# Patient Record
Sex: FEMALE | Race: WHITE | NOT HISPANIC OR LATINO | Employment: FULL TIME | URBAN - NONMETROPOLITAN AREA
[De-identification: names, ages, dates, MRNs, and addresses within clinical notes are randomized per-mention and may not be internally consistent; named-entity substitution may affect disease eponyms.]

---

## 2018-04-27 LAB
ALBUMIN SERPL BCP-MCNC: 4.2 G/DL (ref 3.5–5.7)
ALP SERPL-CCNC: 68 IU/L (ref 55–165)
ALT SERPL W P-5'-P-CCNC: 13 IU/L (ref 9–28)
ANION GAP SERPL CALCULATED.3IONS-SCNC: 11.9 MM/L
AST SERPL W P-5'-P-CCNC: 13 U/L (ref 7–26)
BASOPHILS # BLD AUTO: 0.2 X3/UL (ref 0–0.3)
BASOPHILS # BLD AUTO: 2.2 % (ref 0–2)
BILIRUB SERPL-MCNC: 0.6 MG/DL (ref 0.3–1)
BUN SERPL-MCNC: 19 MG/DL (ref 7–25)
CALCIUM SERPL-MCNC: 9.6 MG/DL (ref 8.6–10.5)
CHLORIDE SERPL-SCNC: 104 MM/L (ref 98–107)
CO2 SERPL-SCNC: 28 MM/L (ref 21–31)
CREAT SERPL-MCNC: 0.88 MG/DL (ref 0.6–1.2)
DEPRECATED RDW RBC AUTO: 15.3 %
EGFR (HISTORICAL): > 60 GFR
EGFR AFRICAN AMERICAN (HISTORICAL): > 60 GFR
EOSINOPHIL # BLD AUTO: 0.4 X3/UL (ref 0–0.5)
EOSINOPHIL NFR BLD AUTO: 4.7 % (ref 0–5)
GLUCOSE (HISTORICAL): 93 MG/DL (ref 65–99)
HCT VFR BLD AUTO: 42.7 % (ref 37–47)
HGB BLD-MCNC: 14.3 G/DL (ref 12–16)
LYMPHOCYTES # BLD AUTO: 1.8 X3/UL (ref 1.2–4.2)
LYMPHOCYTES NFR BLD AUTO: 23.2 % (ref 20.5–51.1)
MCH RBC QN AUTO: 29.4 PG (ref 26–34)
MCHC RBC AUTO-ENTMCNC: 33.6 G/DL (ref 31–37)
MCV RBC AUTO: 87.3 FL (ref 81–99)
MONOCYTES # BLD AUTO: 0.7 X3/UL (ref 0–1)
MONOCYTES NFR BLD AUTO: 8.7 % (ref 1.7–12)
NEUTROPHILS # BLD AUTO: 4.7 X3/UL (ref 1.4–6.5)
NEUTS SEG NFR BLD AUTO: 61.2 % (ref 42.2–75.2)
OSMOLALITY, SERUM (HISTORICAL): 279 MOSM (ref 262–291)
PLATELET # BLD AUTO: 251 X3/UL (ref 130–400)
PMV BLD AUTO: 9.5 FL
POTASSIUM SERPL-SCNC: 4.9 MM/L (ref 3.5–5.5)
RBC # BLD AUTO: 4.89 X6/UL (ref 3.9–5.2)
SODIUM SERPL-SCNC: 139 MM/L (ref 134–143)
TOTAL PROTEIN (HISTORICAL): 7 G/DL (ref 6.4–8.9)
WBC # BLD AUTO: 7.7 X3/UL (ref 4.8–10.8)

## 2018-09-28 PROBLEM — I42.9 PRIMARY CARDIOMYOPATHY (HCC): Status: ACTIVE | Noted: 2018-09-28

## 2018-09-28 PROBLEM — I10 BENIGN ESSENTIAL HYPERTENSION: Status: ACTIVE | Noted: 2018-09-28

## 2018-09-28 PROBLEM — I34.0 MITRAL REGURGITATION: Status: ACTIVE | Noted: 2018-09-28

## 2018-09-28 PROBLEM — E66.9 OBESE: Status: ACTIVE | Noted: 2018-09-28

## 2018-09-28 RX ORDER — FUROSEMIDE 20 MG/1
20 TABLET ORAL
COMMUNITY
End: 2018-11-27 | Stop reason: SDUPTHER

## 2018-09-28 RX ORDER — METOPROLOL SUCCINATE 50 MG/1
50 TABLET, EXTENDED RELEASE ORAL DAILY
COMMUNITY
End: 2019-06-24 | Stop reason: SDUPTHER

## 2018-09-28 RX ORDER — LISINOPRIL 10 MG/1
10 TABLET ORAL DAILY
COMMUNITY
End: 2019-06-24 | Stop reason: SDUPTHER

## 2018-09-28 RX ORDER — ASPIRIN 81 MG/1
81 TABLET ORAL DAILY
COMMUNITY
End: 2020-01-01

## 2018-10-29 ENCOUNTER — EVALUATION (OUTPATIENT)
Dept: PHYSICAL THERAPY | Facility: CLINIC | Age: 73
End: 2018-10-29
Payer: MEDICARE

## 2018-10-29 ENCOUNTER — TRANSCRIBE ORDERS (OUTPATIENT)
Dept: PHYSICAL THERAPY | Facility: CLINIC | Age: 73
End: 2018-10-29

## 2018-10-29 DIAGNOSIS — I89.0 LYMPHEDEMA: ICD-10-CM

## 2018-10-29 DIAGNOSIS — Z90.11 STATUS POST RIGHT MASTECTOMY: Primary | ICD-10-CM

## 2018-10-29 PROCEDURE — G8991 OTHER PT/OT GOAL STATUS: HCPCS | Performed by: PHYSICAL THERAPIST

## 2018-10-29 PROCEDURE — G8990 OTHER PT/OT CURRENT STATUS: HCPCS | Performed by: PHYSICAL THERAPIST

## 2018-10-29 PROCEDURE — 97535 SELF CARE MNGMENT TRAINING: CPT | Performed by: PHYSICAL THERAPIST

## 2018-10-29 PROCEDURE — 97161 PT EVAL LOW COMPLEX 20 MIN: CPT | Performed by: PHYSICAL THERAPIST

## 2018-10-29 NOTE — PROGRESS NOTES
PT Discharge    Today's date: 10/29/2018  Patient name: Shawn Cardona  : 1945  MRN: [id number]  Referring provider: Felipe King MD  Dx:   Encounter Diagnosis     ICD-10-CM    1  Status post right mastectomy Z90 11    2  Lymphedema I89 0      Murray Kruse will be discharged from outpatient physical therapy care due to expiration of plan of care  No objective measures updated, Murray Kruse is not present at time of discharge  Assessment    Assessment details: Shawn Cardona is a 68y o  year old female presenting to PT s/p mastectomy with increased risk for developing UE lymphedema  At this time limb volume is stable and no MLD is indicated  Measurements taken and recommendations made for compression garment to RUE  Bandaging is not indicated due to stable volume  She will contact clinic following her oncology follow up to discuss additional PT needs    Thank you for the referral    Understanding of Dx/Px/POC: good   Prognosis: good    Goals  STGs to be achieved in 2 - 4 weeks  Demonstrate at least 75% compliance with self MLD  Demonstrate at least 75% compliance with self compression  Demonstrate at least 75% compliance with self skin and nail inspection  Free from s/s of infection  Demonstrate understanding of importance of compliance with POC    LTGs to be achieved in 4 - 6 weeks  Demonstrate at least 100% compliance with self MLD  Demonstrate at least 100% compliance with self compression  Demonstrate at least 100% compliance with self skin and nail inspection  Free from s/s of infection  Demonstrate understanding of day/night compression wearing schedule  Obtain alternative compression to ensure independence with self management    Plan  Planned therapy interventions: manual therapy, therapeutic exercise and therapeutic training  Frequency: 2x week  Duration in weeks: 6          UE girth measurements  Right Left    Mid hand 20 0 19 2   Wrist 16 3 16 1   +10 cm  19 8 20 4   +20 cm 26 4 26 1   Elbow joint line 29 3 29 8   E+10 cm 37 35 4   E+20 cm 38 4 37          Subjective Evaluation    History of Present Illness  Mechanism of injury: Emmanuelle underwent R mastectomy with resection of 6 lymph nodes on 10/9  Follow up with medical oncology is scheduled for 11/9  In the interim she has been referred to PT to discuss compression treatment and CDT to manage lymphedema      Quality of life: good    Pain  No pain reported          Objective    Flowsheet Rows      Most Recent Value   PT/OT G-Codes   Current Score  63   Projected Score  0   Assessment Type  Evaluation   G code set  Other PT/OT Primary   Other PT Primary Current Status ()  CJ   Other PT Primary Goal Status ()  CJ

## 2018-10-29 NOTE — LETTER
2018    Sissy Cavanaugh MD  [address]    Patient: Amisha Chen   YOB: 1945   Date of Visit: 10/29/2018     Encounter Diagnosis     ICD-10-CM    1  Status post right mastectomy Z90 11    2  Lymphedema I89 0        Dear Dr Darrel Prescott:    Please review the attached Plan of Care from [facility] recent visit  Please verify that you agree therapy should continue by signing the attached document and sending it back to our office  If you have any questions or concerns, please don't hesitate to call  Sincerely,    Chasidy Batemanist, PT      Referring Provider:      I certify that I have read the below Plan of Care and certify the need for these services furnished under this plan of treatment while under my care  Sissy Cavanaugh MD  [facility]  [address]: [phone]            Today's date: 10/29/2018  Patient name: Amisha Chen  : 1945  MRN: [id number]  Referring provider: Jaimie Reed MD  Dx: No diagnosis found              Assessment/Plan    Subjective    Objective    Flowsheet Rows      Most Recent Value   PT/OT G-Codes   Current Score  63   Projected Score  0   Assessment Type  Evaluation   G code set  Other PT/OT Primary   Other PT Primary Current Status ()  CJ   Other PT Primary Goal Status ()  CJ        A user error has taken place: encounter opened in error, closed for administrative reasons  PT Evaluation     Today's date: 10/29/2018  Patient name: Amisha Chen  : 1945  MRN: [id number]  Referring provider: Jaimie Reed MD  Dx:   Encounter Diagnosis     ICD-10-CM    1  Status post right mastectomy Z90 11    2  Lymphedema I89 0                   Assessment    Assessment details: Amisha Chen is a 68y o  year old female presenting to PT s/p mastectomy with increased risk for developing UE lymphedema   At this time limb volume is stable and no MLD is indicated  Measurements taken and recommendations made for compression garment to RUE  Bandaging is not indicated due to stable volume  She will contact clinic following her oncology follow up to discuss additional PT needs  Thank you for the referral    Understanding of Dx/Px/POC: good   Prognosis: good    Goals  STGs to be achieved in 2 - 4 weeks  Demonstrate at least 75% compliance with self MLD  Demonstrate at least 75% compliance with self compression  Demonstrate at least 75% compliance with self skin and nail inspection  Free from s/s of infection  Demonstrate understanding of importance of compliance with POC    LTGs to be achieved in 4 - 6 weeks  Demonstrate at least 100% compliance with self MLD  Demonstrate at least 100% compliance with self compression  Demonstrate at least 100% compliance with self skin and nail inspection  Free from s/s of infection  Demonstrate understanding of day/night compression wearing schedule  Obtain alternative compression to ensure independence with self management    Plan  Planned therapy interventions: manual therapy, therapeutic exercise and therapeutic training  Frequency: 2x week  Duration in weeks: 6          UE girth measurements  Right Left    Mid hand 20 0 19 2   Wrist 16 3 16 1   +10 cm  19 8 20 4   +20 cm 26 4 26 1   Elbow joint line 29 3 29 8   E+10 cm 37 35 4   E+20 cm 38 4 37          Subjective Evaluation    History of Present Illness  Mechanism of injury: Emmanuelle underwent R mastectomy with resection of 6 lymph nodes on 10/9  Follow up with medical oncology is scheduled for 11/9  In the interim she has been referred to PT to discuss compression treatment and CDT to manage lymphedema      Quality of life: good    Pain  No pain reported          Objective    Flowsheet Rows      Most Recent Value   PT/OT G-Codes   Current Score  63   Projected Score  0   Assessment Type  Evaluation   G code set  Other PT/OT Primary   Other PT Primary Current Status ()  CJ   Other PT Primary Goal Status ()  CJ

## 2018-10-29 NOTE — PROGRESS NOTES
Today's date: 10/29/2018  Patient name: America Torres  : 1945  MRN: [id number]  Referring provider: Miguel A Michael MD  Dx: No diagnosis found              Assessment/Plan    Subjective    Objective    Flowsheet Rows      Most Recent Value   PT/OT G-Codes   Current Score  63   Projected Score  0   Assessment Type  Evaluation   G code set  Other PT/OT Primary   Other PT Primary Current Status ()  CJ   Other PT Primary Goal Status ()  CJ        A user error has taken place: encounter opened in error, closed for administrative reasons

## 2018-11-09 ENCOUNTER — APPOINTMENT (OUTPATIENT)
Dept: LAB | Facility: HOSPITAL | Age: 73
End: 2018-11-09
Attending: INTERNAL MEDICINE
Payer: MEDICARE

## 2018-11-09 ENCOUNTER — HOSPITAL ENCOUNTER (OUTPATIENT)
Dept: RADIOLOGY | Facility: HOSPITAL | Age: 73
Discharge: HOME | End: 2018-11-09
Attending: INTERNAL MEDICINE
Payer: MEDICARE

## 2018-11-09 ENCOUNTER — TRANSCRIBE ORDERS (OUTPATIENT)
Dept: ADMINISTRATIVE | Facility: HOSPITAL | Age: 73
End: 2018-11-09

## 2018-11-09 DIAGNOSIS — C85.90 NON-HODGKIN'S LYMPHOMA, UNSPECIFIED BODY REGION, UNSPECIFIED NON-HODGKIN LYMPHOMA TYPE (HCC): Primary | ICD-10-CM

## 2018-11-09 DIAGNOSIS — C85.90 NON-HODGKIN'S LYMPHOMA, UNSPECIFIED BODY REGION, UNSPECIFIED NON-HODGKIN LYMPHOMA TYPE (HCC): ICD-10-CM

## 2018-11-09 LAB
ALBUMIN SERPL BCP-MCNC: 4.1 G/DL (ref 3.5–5.7)
ALP SERPL-CCNC: 72 U/L (ref 55–165)
ALT SERPL W P-5'-P-CCNC: 11 U/L (ref 7–52)
ANION GAP SERPL CALCULATED.3IONS-SCNC: 9 MMOL/L (ref 4–13)
AST SERPL W P-5'-P-CCNC: 14 U/L (ref 13–39)
BASOPHILS # BLD AUTO: 0.1 THOUSANDS/ΜL (ref 0–0.1)
BASOPHILS NFR BLD AUTO: 1 % (ref 0–1)
BILIRUB SERPL-MCNC: 0.7 MG/DL (ref 0.2–1)
BUN SERPL-MCNC: 29 MG/DL (ref 7–25)
CALCIUM SERPL-MCNC: 9.8 MG/DL (ref 8.6–10.5)
CHLORIDE SERPL-SCNC: 103 MMOL/L (ref 98–107)
CO2 SERPL-SCNC: 27 MMOL/L (ref 21–31)
CREAT SERPL-MCNC: 0.92 MG/DL (ref 0.6–1.2)
EOSINOPHIL # BLD AUTO: 0.7 THOUSAND/ΜL (ref 0–0.61)
EOSINOPHIL NFR BLD AUTO: 8 % (ref 0–6)
ERYTHROCYTE [DISTWIDTH] IN BLOOD BY AUTOMATED COUNT: 15.1 % (ref 11.6–15.1)
GFR SERPL CREATININE-BSD FRML MDRD: 62 ML/MIN/1.73SQ M
GLUCOSE SERPL-MCNC: 96 MG/DL (ref 65–140)
HCT VFR BLD AUTO: 42.8 % (ref 37–47)
HGB BLD-MCNC: 14.3 G/DL (ref 11.5–15.4)
LYMPHOCYTES # BLD AUTO: 2 THOUSANDS/ΜL (ref 0.6–4.47)
LYMPHOCYTES NFR BLD AUTO: 22 % (ref 14–44)
MCH RBC QN AUTO: 29.5 PG (ref 26.8–34.3)
MCHC RBC AUTO-ENTMCNC: 33.4 G/DL (ref 31.4–37.4)
MCV RBC AUTO: 88 FL (ref 82–98)
MONOCYTES # BLD AUTO: 0.9 THOUSAND/ΜL (ref 0.17–1.22)
MONOCYTES NFR BLD AUTO: 10 % (ref 4–12)
NEUTROPHILS # BLD AUTO: 5.3 THOUSANDS/ΜL (ref 1.85–7.62)
NEUTS SEG NFR BLD AUTO: 59 % (ref 43–75)
NRBC BLD AUTO-RTO: 0 /100 WBCS
PLATELET # BLD AUTO: 303 THOUSANDS/UL (ref 149–390)
PMV BLD AUTO: 8.9 FL (ref 8.9–12.7)
POTASSIUM SERPL-SCNC: 4.6 MMOL/L (ref 3.5–5.5)
PROT SERPL-MCNC: 6.9 G/DL (ref 6.4–8.9)
RBC # BLD AUTO: 4.84 MILLION/UL (ref 3.81–5.12)
SODIUM SERPL-SCNC: 139 MMOL/L (ref 134–143)
WBC # BLD AUTO: 9 THOUSAND/UL (ref 4.31–10.16)

## 2018-11-09 PROCEDURE — 71046 X-RAY EXAM CHEST 2 VIEWS: CPT

## 2018-11-09 PROCEDURE — 85025 COMPLETE CBC W/AUTO DIFF WBC: CPT

## 2018-11-09 PROCEDURE — 80053 COMPREHEN METABOLIC PANEL: CPT

## 2018-11-09 PROCEDURE — 36415 COLL VENOUS BLD VENIPUNCTURE: CPT

## 2018-11-13 ENCOUNTER — OFFICE VISIT (OUTPATIENT)
Dept: CARDIOLOGY CLINIC | Facility: CLINIC | Age: 73
End: 2018-11-13
Payer: MEDICARE

## 2018-11-13 VITALS
WEIGHT: 236 LBS | DIASTOLIC BLOOD PRESSURE: 70 MMHG | HEIGHT: 70 IN | BODY MASS INDEX: 33.79 KG/M2 | SYSTOLIC BLOOD PRESSURE: 108 MMHG | HEART RATE: 72 BPM

## 2018-11-13 DIAGNOSIS — I42.9 PRIMARY CARDIOMYOPATHY (HCC): Primary | ICD-10-CM

## 2018-11-13 DIAGNOSIS — I10 BENIGN ESSENTIAL HYPERTENSION: ICD-10-CM

## 2018-11-13 DIAGNOSIS — I34.0 NON-RHEUMATIC MITRAL REGURGITATION: ICD-10-CM

## 2018-11-13 PROCEDURE — 99213 OFFICE O/P EST LOW 20 MIN: CPT | Performed by: INTERNAL MEDICINE

## 2018-11-13 NOTE — PROGRESS NOTES
HPI:   81-year-old moderately obese female with nonischemic cardiomyopathy mild mitral regurgitation  She recently had a right breast mastectomy for carcinoma  Last ejection fraction was 45%  She has had no chest pain shortness of breath dizziness heart palpitations orthopnea nocturnal dyspnea or ankle swelling  Medications reviewed  Current Outpatient Prescriptions:     aspirin (ECOTRIN LOW STRENGTH) 81 mg EC tablet, Take 81 mg by mouth daily, Disp: , Rfl:     furosemide (LASIX) 20 mg tablet, Take 20 mg by mouth 2 times a week or as directed , Disp: , Rfl:     lisinopril (ZESTRIL) 10 mg tablet, Take 10 mg by mouth daily, Disp: , Rfl:     metoprolol succinate (TOPROL-XL) 50 mg 24 hr tablet, Take 50 mg by mouth daily, Disp: , Rfl:       PHYSICAL EXAM:  Blood pressure 110/70 weight 236 pulse 70 and regular respirations 18  Head eyes ears nose and throat are normal lungs are clear without wheezing rales or rhonchi no chest wall deformity normal breath sounds no pleural friction rub  Cardiac exam reveals a normal PMI no heaves lifts or thrills there is a grade 2 murmur of mitral regurgitation  Right breast mastectomy  Abdomen is soft nontender no masses or bruits or organomegaly normal bowel sounds  IMPRESSION AND PLAN:  Nonischemic cardiomyopathy well compensated    Mild mitral regurgitation asymptomatic    Essential hypertension controlled    Status post right breast mastectomy for carcinoma followed by Oncology      Follow-up in 1 year

## 2018-11-14 ENCOUNTER — TRANSCRIBE ORDERS (OUTPATIENT)
Dept: ADMINISTRATIVE | Facility: HOSPITAL | Age: 73
End: 2018-11-14

## 2018-11-14 DIAGNOSIS — Z79.811 USE OF AROMATASE INHIBITORS: Primary | ICD-10-CM

## 2018-11-27 DIAGNOSIS — R60.9 EDEMA, UNSPECIFIED TYPE: Primary | ICD-10-CM

## 2018-11-27 RX ORDER — FUROSEMIDE 20 MG/1
20 TABLET ORAL 2 TIMES WEEKLY
Qty: 30 TABLET | Refills: 5 | Status: SHIPPED | OUTPATIENT
Start: 2018-11-29 | End: 2019-01-02 | Stop reason: SDUPTHER

## 2018-11-30 ENCOUNTER — HOSPITAL ENCOUNTER (OUTPATIENT)
Dept: BONE DENSITY | Facility: HOSPITAL | Age: 73
Discharge: HOME | End: 2018-11-30
Attending: INTERNAL MEDICINE
Payer: MEDICARE

## 2018-11-30 DIAGNOSIS — Z79.811 USE OF AROMATASE INHIBITORS: ICD-10-CM

## 2018-11-30 PROCEDURE — 77080 DXA BONE DENSITY AXIAL: CPT

## 2019-01-02 DIAGNOSIS — R60.9 EDEMA, UNSPECIFIED TYPE: ICD-10-CM

## 2019-01-02 RX ORDER — FUROSEMIDE 20 MG/1
TABLET ORAL
Qty: 30 TABLET | Refills: 5 | Status: SHIPPED | OUTPATIENT
Start: 2019-01-02 | End: 2019-12-12 | Stop reason: CLARIF

## 2019-05-31 ENCOUNTER — TRANSCRIBE ORDERS (OUTPATIENT)
Dept: ADMINISTRATIVE | Facility: HOSPITAL | Age: 74
End: 2019-05-31

## 2019-05-31 ENCOUNTER — APPOINTMENT (OUTPATIENT)
Dept: LAB | Facility: HOSPITAL | Age: 74
End: 2019-05-31
Attending: INTERNAL MEDICINE
Payer: COMMERCIAL

## 2019-05-31 ENCOUNTER — HOSPITAL ENCOUNTER (OUTPATIENT)
Dept: RADIOLOGY | Facility: HOSPITAL | Age: 74
Discharge: HOME | End: 2019-05-31
Attending: INTERNAL MEDICINE
Payer: COMMERCIAL

## 2019-05-31 DIAGNOSIS — C88.4 MALT LYMPHOMA (HCC): ICD-10-CM

## 2019-05-31 DIAGNOSIS — C50.911 INFILTRATING DUCTAL CARCINOMA OF BREAST, RIGHT (HCC): ICD-10-CM

## 2019-05-31 DIAGNOSIS — C50.911 INFILTRATING DUCTAL CARCINOMA OF BREAST, RIGHT (HCC): Primary | ICD-10-CM

## 2019-05-31 DIAGNOSIS — C85.90 NON-HODGKIN'S LYMPHOMA, UNSPECIFIED BODY REGION, UNSPECIFIED NON-HODGKIN LYMPHOMA TYPE (HCC): ICD-10-CM

## 2019-05-31 LAB
ALBUMIN SERPL BCP-MCNC: 4.3 G/DL (ref 3.5–5.7)
ALP SERPL-CCNC: 75 U/L (ref 55–165)
ALT SERPL W P-5'-P-CCNC: 10 U/L (ref 7–52)
ANION GAP SERPL CALCULATED.3IONS-SCNC: 9 MMOL/L (ref 4–13)
AST SERPL W P-5'-P-CCNC: 13 U/L (ref 13–39)
BASOPHILS # BLD AUTO: 0.1 THOUSANDS/ΜL (ref 0–0.1)
BASOPHILS NFR BLD AUTO: 1 % (ref 0–1)
BILIRUB SERPL-MCNC: 0.6 MG/DL (ref 0.2–1)
BUN SERPL-MCNC: 24 MG/DL (ref 7–25)
CALCIUM SERPL-MCNC: 9.8 MG/DL (ref 8.6–10.5)
CHLORIDE SERPL-SCNC: 101 MMOL/L (ref 98–107)
CO2 SERPL-SCNC: 29 MMOL/L (ref 21–31)
CREAT SERPL-MCNC: 0.92 MG/DL (ref 0.6–1.2)
EOSINOPHIL # BLD AUTO: 0.4 THOUSAND/ΜL (ref 0–0.61)
EOSINOPHIL NFR BLD AUTO: 4 % (ref 0–6)
ERYTHROCYTE [DISTWIDTH] IN BLOOD BY AUTOMATED COUNT: 14.8 % (ref 11.6–15.1)
GFR SERPL CREATININE-BSD FRML MDRD: 62 ML/MIN/1.73SQ M
GLUCOSE SERPL-MCNC: 84 MG/DL (ref 65–140)
HCT VFR BLD AUTO: 42.8 % (ref 37–47)
HGB BLD-MCNC: 14.2 G/DL (ref 11.5–15.4)
LYMPHOCYTES # BLD AUTO: 2 THOUSANDS/ΜL (ref 0.6–4.47)
LYMPHOCYTES NFR BLD AUTO: 21 % (ref 14–44)
MCH RBC QN AUTO: 29.1 PG (ref 26.8–34.3)
MCHC RBC AUTO-ENTMCNC: 33.3 G/DL (ref 31.4–37.4)
MCV RBC AUTO: 88 FL (ref 82–98)
MONOCYTES # BLD AUTO: 0.9 THOUSAND/ΜL (ref 0.17–1.22)
MONOCYTES NFR BLD AUTO: 9 % (ref 4–12)
NEUTROPHILS # BLD AUTO: 6.1 THOUSANDS/ΜL (ref 1.85–7.62)
NEUTS SEG NFR BLD AUTO: 64 % (ref 43–75)
PLATELET # BLD AUTO: 314 THOUSANDS/UL (ref 149–390)
PMV BLD AUTO: 8.9 FL (ref 8.9–12.7)
POTASSIUM SERPL-SCNC: 4.9 MMOL/L (ref 3.5–5.5)
PROT SERPL-MCNC: 7.4 G/DL (ref 6.4–8.9)
RBC # BLD AUTO: 4.89 MILLION/UL (ref 3.81–5.12)
SODIUM SERPL-SCNC: 139 MMOL/L (ref 134–143)
WBC # BLD AUTO: 9.6 THOUSAND/UL (ref 4.31–10.16)

## 2019-05-31 PROCEDURE — 86300 IMMUNOASSAY TUMOR CA 15-3: CPT

## 2019-05-31 PROCEDURE — 80053 COMPREHEN METABOLIC PANEL: CPT

## 2019-05-31 PROCEDURE — 85025 COMPLETE CBC W/AUTO DIFF WBC: CPT

## 2019-05-31 PROCEDURE — 36415 COLL VENOUS BLD VENIPUNCTURE: CPT

## 2019-05-31 PROCEDURE — 71046 X-RAY EXAM CHEST 2 VIEWS: CPT

## 2019-06-01 LAB — CANCER AG15-3 SERPL-ACNC: 30 U/ML (ref 0–25)

## 2019-06-19 ENCOUNTER — TRANSCRIBE ORDERS (OUTPATIENT)
Dept: ADMINISTRATIVE | Facility: HOSPITAL | Age: 74
End: 2019-06-19

## 2019-06-19 DIAGNOSIS — N28.1 ACQUIRED CYST OF KIDNEY: Primary | ICD-10-CM

## 2019-06-24 DIAGNOSIS — I10 BENIGN HYPERTENSION: Primary | ICD-10-CM

## 2019-06-24 RX ORDER — METOPROLOL SUCCINATE 50 MG/1
50 TABLET, EXTENDED RELEASE ORAL DAILY
Qty: 90 TABLET | Refills: 4 | Status: SHIPPED | OUTPATIENT
Start: 2019-06-24 | End: 2020-01-01

## 2019-06-24 RX ORDER — LISINOPRIL 10 MG/1
10 TABLET ORAL DAILY
Qty: 90 TABLET | Refills: 4 | Status: SHIPPED | OUTPATIENT
Start: 2019-06-24 | End: 2019-08-28 | Stop reason: SDUPTHER

## 2019-06-26 ENCOUNTER — HOSPITAL ENCOUNTER (OUTPATIENT)
Dept: ULTRASOUND IMAGING | Facility: HOSPITAL | Age: 74
Discharge: HOME | End: 2019-06-26
Attending: UROLOGY
Payer: COMMERCIAL

## 2019-06-26 DIAGNOSIS — N28.1 ACQUIRED CYST OF KIDNEY: ICD-10-CM

## 2019-06-26 PROCEDURE — 76770 US EXAM ABDO BACK WALL COMP: CPT

## 2019-08-28 DIAGNOSIS — I10 BENIGN HYPERTENSION: ICD-10-CM

## 2019-08-28 RX ORDER — LISINOPRIL 10 MG/1
10 TABLET ORAL DAILY
Qty: 90 TABLET | Refills: 4 | Status: SHIPPED | OUTPATIENT
Start: 2019-08-28 | End: 2020-01-01 | Stop reason: SDUPTHER

## 2019-09-23 ENCOUNTER — TELEPHONE (OUTPATIENT)
Dept: CARDIOLOGY CLINIC | Facility: CLINIC | Age: 74
End: 2019-09-23

## 2019-09-23 NOTE — TELEPHONE ENCOUNTER
Patient called she is having Mohrs surgery on the 16th  She is on a low dose aspirin  Her surgeon said to take all meds as normal before surgery  She states the last time she had this done she bled a lot and she thinks it had to do with her being on the aspirin  She wants to know if she should/ it would be safe stop the aspirin a couple days before the surgery

## 2019-11-29 ENCOUNTER — TRANSCRIBE ORDERS (OUTPATIENT)
Dept: ADMINISTRATIVE | Facility: HOSPITAL | Age: 74
End: 2019-11-29

## 2019-11-29 ENCOUNTER — LAB (OUTPATIENT)
Dept: LAB | Facility: HOSPITAL | Age: 74
End: 2019-11-29
Payer: COMMERCIAL

## 2019-11-29 ENCOUNTER — HOSPITAL ENCOUNTER (OUTPATIENT)
Dept: RADIOLOGY | Facility: HOSPITAL | Age: 74
Discharge: HOME | End: 2019-11-29
Payer: COMMERCIAL

## 2019-11-29 DIAGNOSIS — C34.90 MALIGNANT NEOPLASM OF LUNG, UNSPECIFIED LATERALITY, UNSPECIFIED PART OF LUNG (HCC): Primary | ICD-10-CM

## 2019-11-29 DIAGNOSIS — C34.90 MALIGNANT NEOPLASM OF LUNG, UNSPECIFIED LATERALITY, UNSPECIFIED PART OF LUNG (HCC): ICD-10-CM

## 2019-11-29 DIAGNOSIS — C50.911 MALIGNANT NEOPLASM OF RIGHT FEMALE BREAST, UNSPECIFIED ESTROGEN RECEPTOR STATUS, UNSPECIFIED SITE OF BREAST (HCC): ICD-10-CM

## 2019-11-29 LAB
ALBUMIN SERPL BCP-MCNC: 3.7 G/DL (ref 3.5–5)
ALP SERPL-CCNC: 108 U/L (ref 46–116)
ALT SERPL W P-5'-P-CCNC: 21 U/L (ref 12–78)
ANION GAP SERPL CALCULATED.3IONS-SCNC: 10 MMOL/L (ref 4–13)
AST SERPL W P-5'-P-CCNC: 20 U/L (ref 5–45)
BASOPHILS # BLD AUTO: 0.08 THOUSANDS/ΜL (ref 0–0.1)
BASOPHILS NFR BLD AUTO: 1 % (ref 0–1)
BILIRUB SERPL-MCNC: 0.7 MG/DL (ref 0.2–1)
BUN SERPL-MCNC: 20 MG/DL (ref 5–25)
CALCIUM SERPL-MCNC: 9.2 MG/DL (ref 8.3–10.1)
CHLORIDE SERPL-SCNC: 105 MMOL/L (ref 100–108)
CO2 SERPL-SCNC: 24 MMOL/L (ref 21–32)
CREAT SERPL-MCNC: 0.93 MG/DL (ref 0.6–1.3)
EOSINOPHIL # BLD AUTO: 0.38 THOUSAND/ΜL (ref 0–0.61)
EOSINOPHIL NFR BLD AUTO: 4 % (ref 0–6)
ERYTHROCYTE [DISTWIDTH] IN BLOOD BY AUTOMATED COUNT: 15.4 % (ref 11.6–15.1)
GFR SERPL CREATININE-BSD FRML MDRD: 61 ML/MIN/1.73SQ M
GLUCOSE P FAST SERPL-MCNC: 92 MG/DL (ref 65–99)
HCT VFR BLD AUTO: 49.1 % (ref 34.8–46.1)
HGB BLD-MCNC: 14.8 G/DL (ref 11.5–15.4)
IMM GRANULOCYTES # BLD AUTO: 0.03 THOUSAND/UL (ref 0–0.2)
IMM GRANULOCYTES NFR BLD AUTO: 0 % (ref 0–2)
LYMPHOCYTES # BLD AUTO: 2.26 THOUSANDS/ΜL (ref 0.6–4.47)
LYMPHOCYTES NFR BLD AUTO: 23 % (ref 14–44)
MCH RBC QN AUTO: 27.9 PG (ref 26.8–34.3)
MCHC RBC AUTO-ENTMCNC: 30.1 G/DL (ref 31.4–37.4)
MCV RBC AUTO: 93 FL (ref 82–98)
MONOCYTES # BLD AUTO: 0.91 THOUSAND/ΜL (ref 0.17–1.22)
MONOCYTES NFR BLD AUTO: 9 % (ref 4–12)
NEUTROPHILS # BLD AUTO: 6.34 THOUSANDS/ΜL (ref 1.85–7.62)
NEUTS SEG NFR BLD AUTO: 63 % (ref 43–75)
NRBC BLD AUTO-RTO: 0 /100 WBCS
PLATELET # BLD AUTO: 318 THOUSANDS/UL (ref 149–390)
PMV BLD AUTO: 10.4 FL (ref 8.9–12.7)
POTASSIUM SERPL-SCNC: 4.4 MMOL/L (ref 3.5–5.3)
PROT SERPL-MCNC: 7.9 G/DL (ref 6.4–8.2)
RBC # BLD AUTO: 5.31 MILLION/UL (ref 3.81–5.12)
SODIUM SERPL-SCNC: 139 MMOL/L (ref 136–145)
WBC # BLD AUTO: 10 THOUSAND/UL (ref 4.31–10.16)

## 2019-11-29 PROCEDURE — 85025 COMPLETE CBC W/AUTO DIFF WBC: CPT

## 2019-11-29 PROCEDURE — 71046 X-RAY EXAM CHEST 2 VIEWS: CPT

## 2019-11-29 PROCEDURE — 86300 IMMUNOASSAY TUMOR CA 15-3: CPT

## 2019-11-29 PROCEDURE — 80053 COMPREHEN METABOLIC PANEL: CPT

## 2019-11-29 PROCEDURE — 36415 COLL VENOUS BLD VENIPUNCTURE: CPT

## 2019-11-30 LAB — CANCER AG15-3 SERPL-ACNC: 25.3 U/ML (ref 0–25)

## 2019-12-12 ENCOUNTER — OFFICE VISIT (OUTPATIENT)
Dept: CARDIOLOGY CLINIC | Facility: CLINIC | Age: 74
End: 2019-12-12
Payer: COMMERCIAL

## 2019-12-12 VITALS
WEIGHT: 237 LBS | SYSTOLIC BLOOD PRESSURE: 126 MMHG | BODY MASS INDEX: 35.92 KG/M2 | HEART RATE: 80 BPM | DIASTOLIC BLOOD PRESSURE: 84 MMHG | HEIGHT: 68 IN

## 2019-12-12 DIAGNOSIS — I10 BENIGN ESSENTIAL HYPERTENSION: ICD-10-CM

## 2019-12-12 DIAGNOSIS — I34.0 NONRHEUMATIC MITRAL VALVE REGURGITATION: Primary | ICD-10-CM

## 2019-12-12 DIAGNOSIS — I42.9 PRIMARY CARDIOMYOPATHY (HCC): ICD-10-CM

## 2019-12-12 DIAGNOSIS — I48.19 PERSISTENT ATRIAL FIBRILLATION (HCC): ICD-10-CM

## 2019-12-12 PROCEDURE — 93000 ELECTROCARDIOGRAM COMPLETE: CPT | Performed by: INTERNAL MEDICINE

## 2019-12-12 PROCEDURE — 99214 OFFICE O/P EST MOD 30 MIN: CPT | Performed by: INTERNAL MEDICINE

## 2019-12-12 RX ORDER — ANASTROZOLE 1 MG/1
1 TABLET ORAL DAILY
COMMUNITY
Start: 2019-12-05 | End: 2020-01-01

## 2019-12-12 NOTE — PROGRESS NOTES
Patient ID: Alex Robles is a 69 y o  female  Plan:      Benign essential hypertension  Really can't recall the last time it was high  Primary cardiomyopathy ([address] )  Likely adriamycin related  Will reassess  Mitral regurgitation  I can't hear much but will check echo  Persistent atrial fibrillation    This started some time after the last year's visit  Completely without symptoms  Heart rate is controlled  See discussion in follow-up plan but for now will check an echocardiogram before deciding upon anticoagulation  I do not believe rhythm control his proper here  Follow up Plan: We will talk after her her echocardiogram     For now furosemide can be stopped as she is only using it once a week  If there are features on the echo that suggest a strong need for anticoagulation we will speak  Otherwise if it is borderline she would like to avoid such a medication  HPI:   The patient comes in for yearly follow-up  In the past she saw my now retired partner  She has a history of mild nonischemic cardiomyopathy presumed related to prior Adriamycin for non-Hodgkin's lymphoma  Of note is that she had a 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   No chemo or radiation was required  There has been no recent change in exertional capacity  No palpitations  This is despite the fact that she has atrial fibrillation with a controlled rate on today's EKG  Results for orders placed or performed in visit on 12/12/19   POCT ECG    Impression    Afib with controlled rate  Left axis  Otherwise WNL  Most recent or relevant cardiac/vascular testing:      Echocardiogram November 2016: Ejection fraction 45%        Past Surgical History:   Procedure Laterality Date    MASTECTOMY Right 2018    TOTAL KNEE ARTHROPLASTY Right 2013     CMP:   Lab Results   Component Value Date     04/27/2018    K 4 4 11/29/2019    K 4 9 04/27/2018     11/29/2019    CL 104 04/27/2018    CO2 24 11/29/2019    CO2 28 04/27/2018    BUN 20 11/29/2019    BUN 19 04/27/2018    CREATININE 0 93 11/29/2019    CREATININE 0 88 04/27/2018    GLUCOSE 103 07/02/2015    EGFR 61 11/29/2019       Lipid Profile: No results found for: CHOL, TRIG, HDL, LDL      Review of Systems   10  point ROS  was otherwise non pertinent or negative except as per HPI or as below  Gait: Normal         Objective:     /84   Pulse 80   Ht 5' 8" (1 727 m)   Wt 108 kg (237 lb)   BMI 36 04 kg/m²     PHYSICAL EXAM:    General:  Normal appearance in no distress  Eyes:  Anicteric  Oral mucosa:  Moist   Neck:  No JVD  Carotid upstrokes are brisk without bruits  No masses  Chest:  Clear to auscultation and percussion  Cardiac:    Irregularly irregular  Normal PMI  Normal S1 and S2  No murmur gallop or rub  Abdomen:  Soft and nontender  No palpable organomegaly or aortic enlargement  Extremities:  No peripheral edema  Musculoskeletal:  Symmetric  Vascular:  Femoral pulses are brisk without bruits  Popliteal pulses are intact bilaterally  Pedal pulses are intact  Neuro:  Grossly symmetric  Psych:  Alert and oriented x3          Current Outpatient Medications:     anastrozole (ARIMIDEX) 1 mg tablet, Take 1 mg by mouth daily, Disp: , Rfl:     aspirin (ECOTRIN LOW STRENGTH) 81 mg EC tablet, Take 81 mg by mouth daily, Disp: , Rfl:     lisinopril (ZESTRIL) 10 mg tablet, Take 1 tablet (10 mg total) by mouth daily, Disp: 90 tablet, Rfl: 4    metoprolol succinate (TOPROL-XL) 50 mg 24 hr tablet, Take 1 tablet (50 mg total) by mouth daily, Disp: 90 tablet, Rfl: 4  Allergies   Allergen Reactions    Celecoxib Other (See Comments)    Other Other (See Comments)     Skin irritated and opened up     Past Medical History:   Diagnosis Date    Breast cancer ([address] )     s/p right mastectomy    [facility]     Essential (primary) hypertension     Hx of echocardiogram 06/05/2015    EF0 45 (45%) mild mitral regurg   / 2D w/CFD; EF0 43 (43%) mild MR 11/25/16    Mitral regurgitation     Non Hodgkin's lymphoma (HCC)     Nonrheumatic mitral (valve) insufficiency     Obesity            Social History     Tobacco Use   Smoking Status Former Smoker   Smokeless Tobacco Never Used

## 2019-12-27 ENCOUNTER — HOSPITAL ENCOUNTER (OUTPATIENT)
Dept: NON INVASIVE DIAGNOSTICS | Facility: HOSPITAL | Age: 74
Discharge: HOME | End: 2019-12-27
Payer: COMMERCIAL

## 2019-12-27 DIAGNOSIS — I48.19 PERSISTENT ATRIAL FIBRILLATION (HCC): ICD-10-CM

## 2019-12-27 PROCEDURE — 93306 TTE W/DOPPLER COMPLETE: CPT

## 2019-12-30 PROCEDURE — 93306 TTE W/DOPPLER COMPLETE: CPT | Performed by: INTERNAL MEDICINE

## 2020-01-01 ENCOUNTER — OFFICE VISIT (OUTPATIENT)
Dept: CARDIOLOGY CLINIC | Facility: CLINIC | Age: 75
End: 2020-01-01
Payer: COMMERCIAL

## 2020-01-01 ENCOUNTER — APPOINTMENT (EMERGENCY)
Dept: RADIOLOGY | Facility: HOSPITAL | Age: 75
DRG: 872 | End: 2020-01-01
Payer: COMMERCIAL

## 2020-01-01 ENCOUNTER — APPOINTMENT (OUTPATIENT)
Dept: LAB | Facility: HOSPITAL | Age: 75
End: 2020-01-01
Payer: COMMERCIAL

## 2020-01-01 ENCOUNTER — APPOINTMENT (INPATIENT)
Dept: RADIOLOGY | Facility: HOSPITAL | Age: 75
DRG: 177 | End: 2020-01-01
Payer: COMMERCIAL

## 2020-01-01 ENCOUNTER — APPOINTMENT (INPATIENT)
Dept: CT IMAGING | Facility: HOSPITAL | Age: 75
DRG: 872 | End: 2020-01-01
Payer: COMMERCIAL

## 2020-01-01 ENCOUNTER — APPOINTMENT (EMERGENCY)
Dept: CT IMAGING | Facility: HOSPITAL | Age: 75
DRG: 872 | End: 2020-01-01
Payer: COMMERCIAL

## 2020-01-01 ENCOUNTER — HOSPITAL ENCOUNTER (INPATIENT)
Facility: HOSPITAL | Age: 75
LOS: 1 days | DRG: 872 | End: 2021-01-01
Attending: EMERGENCY MEDICINE | Admitting: INTERNAL MEDICINE
Payer: COMMERCIAL

## 2020-01-01 ENCOUNTER — TRANSCRIBE ORDERS (OUTPATIENT)
Dept: ADMINISTRATIVE | Facility: HOSPITAL | Age: 75
End: 2020-01-01

## 2020-01-01 ENCOUNTER — HOSPITAL ENCOUNTER (INPATIENT)
Facility: HOSPITAL | Age: 75
LOS: 14 days | Discharge: NURSING, REHAB OR LONG-TERM CARE | DRG: 177 | End: 2020-11-25
Attending: EMERGENCY MEDICINE | Admitting: HOSPITALIST
Payer: COMMERCIAL

## 2020-01-01 ENCOUNTER — APPOINTMENT (INPATIENT)
Dept: RADIOLOGY | Facility: HOSPITAL | Age: 75
DRG: 872 | End: 2020-01-01
Payer: COMMERCIAL

## 2020-01-01 ENCOUNTER — APPOINTMENT (EMERGENCY)
Dept: RADIOLOGY | Facility: HOSPITAL | Age: 75
DRG: 177 | End: 2020-01-01
Payer: COMMERCIAL

## 2020-01-01 ENCOUNTER — TELEPHONE (OUTPATIENT)
Dept: CARDIOLOGY CLINIC | Facility: CLINIC | Age: 75
End: 2020-01-01

## 2020-01-01 ENCOUNTER — TELEMEDICINE (OUTPATIENT)
Dept: CARDIOLOGY CLINIC | Facility: CLINIC | Age: 75
End: 2020-01-01
Payer: COMMERCIAL

## 2020-01-01 ENCOUNTER — OFFICE VISIT (OUTPATIENT)
Dept: URGENT CARE | Facility: CLINIC | Age: 75
End: 2020-01-01
Payer: COMMERCIAL

## 2020-01-01 ENCOUNTER — HOSPITAL ENCOUNTER (OUTPATIENT)
Dept: CT IMAGING | Facility: HOSPITAL | Age: 75
Discharge: HOME | End: 2020-07-10
Payer: COMMERCIAL

## 2020-01-01 VITALS
BODY MASS INDEX: 36.98 KG/M2 | DIASTOLIC BLOOD PRESSURE: 72 MMHG | SYSTOLIC BLOOD PRESSURE: 134 MMHG | WEIGHT: 209 LBS | DIASTOLIC BLOOD PRESSURE: 86 MMHG | HEIGHT: 68 IN | HEART RATE: 72 BPM | SYSTOLIC BLOOD PRESSURE: 118 MMHG | BODY MASS INDEX: 31.67 KG/M2 | WEIGHT: 244 LBS | HEIGHT: 68 IN | HEART RATE: 72 BPM

## 2020-01-01 VITALS
SYSTOLIC BLOOD PRESSURE: 120 MMHG | DIASTOLIC BLOOD PRESSURE: 72 MMHG | HEART RATE: 62 BPM | WEIGHT: 228 LBS | BODY MASS INDEX: 34.56 KG/M2 | HEIGHT: 68 IN

## 2020-01-01 VITALS
SYSTOLIC BLOOD PRESSURE: 102 MMHG | HEIGHT: 70 IN | BODY MASS INDEX: 25.25 KG/M2 | HEART RATE: 114 BPM | DIASTOLIC BLOOD PRESSURE: 60 MMHG | WEIGHT: 176.37 LBS | OXYGEN SATURATION: 94 % | RESPIRATION RATE: 16 BRPM | TEMPERATURE: 97 F

## 2020-01-01 VITALS — BODY MASS INDEX: 34.63 KG/M2 | HEART RATE: 78 BPM | WEIGHT: 228.5 LBS | HEIGHT: 68 IN

## 2020-01-01 VITALS
HEART RATE: 95 BPM | SYSTOLIC BLOOD PRESSURE: 122 MMHG | TEMPERATURE: 97.8 F | OXYGEN SATURATION: 88 % | RESPIRATION RATE: 16 BRPM | DIASTOLIC BLOOD PRESSURE: 62 MMHG

## 2020-01-01 VITALS
SYSTOLIC BLOOD PRESSURE: 140 MMHG | DIASTOLIC BLOOD PRESSURE: 80 MMHG | HEIGHT: 68 IN | HEART RATE: 80 BPM | BODY MASS INDEX: 36.22 KG/M2 | WEIGHT: 239 LBS

## 2020-01-01 VITALS
HEART RATE: 78 BPM | SYSTOLIC BLOOD PRESSURE: 128 MMHG | BODY MASS INDEX: 35.61 KG/M2 | WEIGHT: 235 LBS | DIASTOLIC BLOOD PRESSURE: 84 MMHG | HEIGHT: 68 IN

## 2020-01-01 VITALS
WEIGHT: 200 LBS | SYSTOLIC BLOOD PRESSURE: 110 MMHG | HEIGHT: 68 IN | DIASTOLIC BLOOD PRESSURE: 80 MMHG | HEART RATE: 74 BPM | BODY MASS INDEX: 30.31 KG/M2

## 2020-01-01 DIAGNOSIS — J96.02 ACUTE RESPIRATORY FAILURE WITH HYPOXIA AND HYPERCAPNIA (HCC): ICD-10-CM

## 2020-01-01 DIAGNOSIS — I50.22 CHRONIC SYSTOLIC CONGESTIVE HEART FAILURE (HCC): Primary | ICD-10-CM

## 2020-01-01 DIAGNOSIS — I48.19 PERSISTENT ATRIAL FIBRILLATION (HCC): Primary | ICD-10-CM

## 2020-01-01 DIAGNOSIS — L03.317 CELLULITIS OF RIGHT BUTTOCK: Primary | ICD-10-CM

## 2020-01-01 DIAGNOSIS — E87.5 HYPERKALEMIA: ICD-10-CM

## 2020-01-01 DIAGNOSIS — I50.22 CHRONIC SYSTOLIC CONGESTIVE HEART FAILURE (HCC): ICD-10-CM

## 2020-01-01 DIAGNOSIS — J96.11 CHRONIC RESPIRATORY FAILURE WITH HYPOXIA, ON HOME OXYGEN THERAPY (HCC): ICD-10-CM

## 2020-01-01 DIAGNOSIS — R19.4 CHANGE IN BOWEL HABITS: ICD-10-CM

## 2020-01-01 DIAGNOSIS — I34.0 NONRHEUMATIC MITRAL VALVE REGURGITATION: Primary | ICD-10-CM

## 2020-01-01 DIAGNOSIS — R14.0 ABDOMINAL DISTENSION: ICD-10-CM

## 2020-01-01 DIAGNOSIS — J12.82 PNEUMONIA DUE TO COVID-19 VIRUS: ICD-10-CM

## 2020-01-01 DIAGNOSIS — N17.9 ACUTE RENAL FAILURE SUPERIMPOSED ON STAGE 3A CHRONIC KIDNEY DISEASE, UNSPECIFIED ACUTE RENAL FAILURE TYPE (HCC): ICD-10-CM

## 2020-01-01 DIAGNOSIS — C88.4 MALT LYMPHOMA (HCC): Primary | ICD-10-CM

## 2020-01-01 DIAGNOSIS — J96.01 ACUTE RESPIRATORY FAILURE WITH HYPOXIA AND HYPERCAPNIA (HCC): ICD-10-CM

## 2020-01-01 DIAGNOSIS — U07.1 PNEUMONIA DUE TO COVID-19 VIRUS: ICD-10-CM

## 2020-01-01 DIAGNOSIS — I42.9 PRIMARY CARDIOMYOPATHY (HCC): ICD-10-CM

## 2020-01-01 DIAGNOSIS — C88.4 MALT LYMPHOMA (HCC): ICD-10-CM

## 2020-01-01 DIAGNOSIS — I48.19 PERSISTENT ATRIAL FIBRILLATION (HCC): ICD-10-CM

## 2020-01-01 DIAGNOSIS — C50.911 MALIGNANT NEOPLASM OF RIGHT FEMALE BREAST, UNSPECIFIED ESTROGEN RECEPTOR STATUS, UNSPECIFIED SITE OF BREAST (HCC): ICD-10-CM

## 2020-01-01 DIAGNOSIS — E66.09 CLASS 2 OBESITY DUE TO EXCESS CALORIES WITHOUT SERIOUS COMORBIDITY WITH BODY MASS INDEX (BMI) OF 37.0 TO 37.9 IN ADULT: ICD-10-CM

## 2020-01-01 DIAGNOSIS — I27.20 PULMONARY HYPERTENSION (HCC): ICD-10-CM

## 2020-01-01 DIAGNOSIS — R09.02 HYPOXIA: ICD-10-CM

## 2020-01-01 DIAGNOSIS — I10 BENIGN HYPERTENSION: ICD-10-CM

## 2020-01-01 DIAGNOSIS — N17.9 ACUTE KIDNEY INJURY (HCC): ICD-10-CM

## 2020-01-01 DIAGNOSIS — D64.9 ANEMIA: ICD-10-CM

## 2020-01-01 DIAGNOSIS — C50.911 MALIGNANT NEOPLASM OF RIGHT FEMALE BREAST, UNSPECIFIED ESTROGEN RECEPTOR STATUS, UNSPECIFIED SITE OF BREAST (HCC): Primary | ICD-10-CM

## 2020-01-01 DIAGNOSIS — I42.9 PRIMARY CARDIOMYOPATHY (HCC): Primary | ICD-10-CM

## 2020-01-01 DIAGNOSIS — I34.0 NONRHEUMATIC MITRAL VALVE REGURGITATION: ICD-10-CM

## 2020-01-01 DIAGNOSIS — K64.8 OTHER HEMORRHOIDS: ICD-10-CM

## 2020-01-01 DIAGNOSIS — R60.0 LOCALIZED EDEMA: ICD-10-CM

## 2020-01-01 DIAGNOSIS — N17.9 ACUTE RENAL FAILURE (ARF) (HCC): ICD-10-CM

## 2020-01-01 DIAGNOSIS — R19.5 ABNORMAL FECES: Primary | ICD-10-CM

## 2020-01-01 DIAGNOSIS — I10 BENIGN ESSENTIAL HYPERTENSION: ICD-10-CM

## 2020-01-01 DIAGNOSIS — R06.02 SOB (SHORTNESS OF BREATH): Primary | ICD-10-CM

## 2020-01-01 DIAGNOSIS — R19.7 DIARRHEA, UNSPECIFIED TYPE: ICD-10-CM

## 2020-01-01 DIAGNOSIS — R19.5 ABNORMAL FECES: ICD-10-CM

## 2020-01-01 DIAGNOSIS — Z99.81 CHRONIC RESPIRATORY FAILURE WITH HYPOXIA, ON HOME OXYGEN THERAPY (HCC): ICD-10-CM

## 2020-01-01 DIAGNOSIS — R06.00 DYSPNEA: ICD-10-CM

## 2020-01-01 DIAGNOSIS — U07.1 COVID-19: Primary | ICD-10-CM

## 2020-01-01 DIAGNOSIS — I10 BENIGN ESSENTIAL HYPERTENSION: Primary | ICD-10-CM

## 2020-01-01 DIAGNOSIS — N18.31 ACUTE RENAL FAILURE SUPERIMPOSED ON STAGE 3A CHRONIC KIDNEY DISEASE, UNSPECIFIED ACUTE RENAL FAILURE TYPE (HCC): ICD-10-CM

## 2020-01-01 LAB
1,25(OH)2D3 SERPL-MCNC: 29.5 PG/ML (ref 19.9–79.3)
ABO GROUP BLD BPU: NORMAL
ABO GROUP BLD: NORMAL
ALBUMIN SERPL BCP-MCNC: 2.5 G/DL (ref 3.5–5)
ALBUMIN SERPL BCP-MCNC: 3.3 G/DL (ref 3.5–5.7)
ALBUMIN SERPL BCP-MCNC: 3.4 G/DL (ref 3.5–5.7)
ALBUMIN SERPL BCP-MCNC: 3.4 G/DL (ref 3.5–5.7)
ALBUMIN SERPL BCP-MCNC: 3.6 G/DL (ref 3.5–5.7)
ALBUMIN SERPL BCP-MCNC: 3.6 G/DL (ref 3.5–5.7)
ALBUMIN SERPL BCP-MCNC: 3.7 G/DL (ref 3.5–5)
ALBUMIN SERPL BCP-MCNC: 3.7 G/DL (ref 3.5–5.7)
ALBUMIN SERPL BCP-MCNC: 3.7 G/DL (ref 3.5–5.7)
ALP SERPL-CCNC: 103 U/L (ref 55–165)
ALP SERPL-CCNC: 142 U/L (ref 46–116)
ALP SERPL-CCNC: 160 U/L (ref 46–116)
ALP SERPL-CCNC: 82 U/L (ref 55–165)
ALP SERPL-CCNC: 88 U/L (ref 55–165)
ALP SERPL-CCNC: 89 U/L (ref 55–165)
ALP SERPL-CCNC: 89 U/L (ref 55–165)
ALP SERPL-CCNC: 91 U/L (ref 55–165)
ALP SERPL-CCNC: 93 U/L (ref 55–165)
ALP SERPL-CCNC: 95 U/L (ref 55–165)
ALP SERPL-CCNC: 97 U/L (ref 55–165)
ALT SERPL W P-5'-P-CCNC: 17 U/L (ref 7–52)
ALT SERPL W P-5'-P-CCNC: 18 U/L (ref 12–78)
ALT SERPL W P-5'-P-CCNC: 18 U/L (ref 12–78)
ALT SERPL W P-5'-P-CCNC: 18 U/L (ref 7–52)
ALT SERPL W P-5'-P-CCNC: 18 U/L (ref 7–52)
ALT SERPL W P-5'-P-CCNC: 19 U/L (ref 7–52)
ALT SERPL W P-5'-P-CCNC: 21 U/L (ref 7–52)
ALT SERPL W P-5'-P-CCNC: 22 U/L (ref 7–52)
ALT SERPL W P-5'-P-CCNC: 25 U/L (ref 7–52)
ALT SERPL W P-5'-P-CCNC: 26 U/L (ref 7–52)
ALT SERPL W P-5'-P-CCNC: 27 U/L (ref 7–52)
ANION GAP SERPL CALCULATED.3IONS-SCNC: 10 MMOL/L (ref 4–13)
ANION GAP SERPL CALCULATED.3IONS-SCNC: 11 MMOL/L (ref 4–13)
ANION GAP SERPL CALCULATED.3IONS-SCNC: 12 MMOL/L (ref 4–13)
ANION GAP SERPL CALCULATED.3IONS-SCNC: 12 MMOL/L (ref 4–13)
ANION GAP SERPL CALCULATED.3IONS-SCNC: 15 MMOL/L (ref 4–13)
ANION GAP SERPL CALCULATED.3IONS-SCNC: 6 MMOL/L (ref 4–13)
ANION GAP SERPL CALCULATED.3IONS-SCNC: 7 MMOL/L (ref 4–13)
ANION GAP SERPL CALCULATED.3IONS-SCNC: 7 MMOL/L (ref 4–13)
ANION GAP SERPL CALCULATED.3IONS-SCNC: 8 MMOL/L (ref 4–13)
ANION GAP SERPL CALCULATED.3IONS-SCNC: 9 MMOL/L (ref 4–13)
APTT PPP: 35 SECONDS (ref 23–37)
AST SERPL W P-5'-P-CCNC: 18 U/L (ref 5–45)
AST SERPL W P-5'-P-CCNC: 20 U/L (ref 13–39)
AST SERPL W P-5'-P-CCNC: 20 U/L (ref 5–45)
AST SERPL W P-5'-P-CCNC: 24 U/L (ref 13–39)
AST SERPL W P-5'-P-CCNC: 25 U/L (ref 13–39)
AST SERPL W P-5'-P-CCNC: 28 U/L (ref 13–39)
AST SERPL W P-5'-P-CCNC: 30 U/L (ref 13–39)
AST SERPL W P-5'-P-CCNC: 46 U/L (ref 13–39)
AST SERPL W P-5'-P-CCNC: 48 U/L (ref 13–39)
ATRIAL RATE: 111 BPM
BACTERIA BLD CULT: NORMAL
BACTERIA BLD CULT: NORMAL
BASOPHILS # BLD AUTO: 0 THOUSANDS/ΜL (ref 0–0.1)
BASOPHILS # BLD AUTO: 0.01 THOUSANDS/ΜL (ref 0–0.1)
BASOPHILS # BLD AUTO: 0.05 THOUSANDS/ΜL (ref 0–0.1)
BASOPHILS # BLD AUTO: 0.05 THOUSANDS/ΜL (ref 0–0.1)
BASOPHILS # BLD AUTO: 0.1 THOUSANDS/ΜL (ref 0–0.1)
BASOPHILS # BLD AUTO: 0.1 THOUSANDS/ΜL (ref 0–0.1)
BASOPHILS NFR BLD AUTO: 0 % (ref 0–1)
BASOPHILS NFR BLD AUTO: 0 % (ref 0–1)
BASOPHILS NFR BLD AUTO: 0 % (ref 0–2)
BASOPHILS NFR BLD AUTO: 1 % (ref 0–1)
BASOPHILS NFR BLD AUTO: 1 % (ref 0–2)
BASOPHILS NFR BLD AUTO: 1 % (ref 0–2)
BILIRUB SERPL-MCNC: 0.5 MG/DL (ref 0.2–1)
BILIRUB SERPL-MCNC: 0.6 MG/DL (ref 0.2–1)
BILIRUB SERPL-MCNC: 0.7 MG/DL (ref 0.2–1)
BILIRUB SERPL-MCNC: 0.8 MG/DL (ref 0.2–1)
BILIRUB SERPL-MCNC: 1 MG/DL (ref 0.2–1)
BILIRUB SERPL-MCNC: 1.3 MG/DL (ref 0.2–1)
BILIRUB SERPL-MCNC: 1.3 MG/DL (ref 0.2–1)
BILIRUB SERPL-MCNC: 1.6 MG/DL (ref 0.2–1)
BILIRUB SERPL-MCNC: 1.8 MG/DL (ref 0.2–1)
BILIRUB SERPL-MCNC: 1.9 MG/DL (ref 0.2–1)
BILIRUB SERPL-MCNC: 2.2 MG/DL (ref 0.2–1)
BLD GP AB SCN SERPL QL: NEGATIVE
BNP SERPL-MCNC: 277 PG/ML (ref 1–100)
BNP SERPL-MCNC: 498 PG/ML (ref 1–100)
BPU ID: NORMAL
BUN SERPL-MCNC: 26 MG/DL (ref 5–25)
BUN SERPL-MCNC: 27 MG/DL (ref 5–25)
BUN SERPL-MCNC: 33 MG/DL (ref 5–25)
BUN SERPL-MCNC: 37 MG/DL (ref 5–25)
BUN SERPL-MCNC: 42 MG/DL (ref 7–25)
BUN SERPL-MCNC: 43 MG/DL (ref 7–25)
BUN SERPL-MCNC: 44 MG/DL (ref 7–25)
BUN SERPL-MCNC: 44 MG/DL (ref 7–25)
BUN SERPL-MCNC: 45 MG/DL (ref 7–25)
BUN SERPL-MCNC: 48 MG/DL (ref 7–25)
BUN SERPL-MCNC: 50 MG/DL (ref 7–25)
BUN SERPL-MCNC: 55 MG/DL (ref 7–25)
BUN SERPL-MCNC: 59 MG/DL (ref 7–25)
BUN SERPL-MCNC: 61 MG/DL (ref 5–25)
BUN SERPL-MCNC: 62 MG/DL (ref 5–25)
BUN SERPL-MCNC: 65 MG/DL (ref 7–25)
BUN SERPL-MCNC: 66 MG/DL (ref 7–25)
BUN SERPL-MCNC: 75 MG/DL (ref 7–25)
BUN SERPL-MCNC: 81 MG/DL (ref 7–25)
BUN SERPL-MCNC: 83 MG/DL (ref 7–25)
CALCIUM ALBUM COR SERPL-MCNC: 10.5 MG/DL (ref 8.3–10.1)
CALCIUM ALBUM COR SERPL-MCNC: 8.9 MG/DL (ref 8.3–10.1)
CALCIUM ALBUM COR SERPL-MCNC: 9 MG/DL (ref 8.3–10.1)
CALCIUM ALBUM COR SERPL-MCNC: 9.1 MG/DL (ref 8.3–10.1)
CALCIUM ALBUM COR SERPL-MCNC: 9.7 MG/DL (ref 8.3–10.1)
CALCIUM ALBUM COR SERPL-MCNC: 9.8 MG/DL (ref 8.3–10.1)
CALCIUM SERPL-MCNC: 8.3 MG/DL (ref 8.3–10.1)
CALCIUM SERPL-MCNC: 8.3 MG/DL (ref 8.6–10.5)
CALCIUM SERPL-MCNC: 8.5 MG/DL (ref 8.6–10.5)
CALCIUM SERPL-MCNC: 8.5 MG/DL (ref 8.6–10.5)
CALCIUM SERPL-MCNC: 8.8 MG/DL (ref 8.3–10.1)
CALCIUM SERPL-MCNC: 8.9 MG/DL (ref 8.6–10.5)
CALCIUM SERPL-MCNC: 9 MG/DL (ref 8.6–10.5)
CALCIUM SERPL-MCNC: 9 MG/DL (ref 8.6–10.5)
CALCIUM SERPL-MCNC: 9.1 MG/DL (ref 8.6–10.5)
CALCIUM SERPL-MCNC: 9.2 MG/DL (ref 8.3–10.1)
CALCIUM SERPL-MCNC: 9.3 MG/DL (ref 8.3–10.1)
CALCIUM SERPL-MCNC: 9.3 MG/DL (ref 8.3–10.1)
CALCIUM SERPL-MCNC: 9.3 MG/DL (ref 8.6–10.5)
CALCIUM SERPL-MCNC: 9.3 MG/DL (ref 8.6–10.5)
CALCIUM SERPL-MCNC: 9.5 MG/DL (ref 8.6–10.5)
CALCIUM SERPL-MCNC: 9.7 MG/DL (ref 8.3–10.1)
CALCIUM SERPL-MCNC: 9.7 MG/DL (ref 8.6–10.5)
CALCIUM SERPL-MCNC: 9.7 MG/DL (ref 8.6–10.5)
CALCIUM SERPL-MCNC: 9.8 MG/DL (ref 8.6–10.5)
CALCIUM SERPL-MCNC: 9.9 MG/DL (ref 8.6–10.5)
CANCER AG15-3 SERPL-ACNC: 25.9 U/ML (ref 0–25)
CHLORIDE SERPL-SCNC: 100 MMOL/L (ref 100–108)
CHLORIDE SERPL-SCNC: 100 MMOL/L (ref 100–108)
CHLORIDE SERPL-SCNC: 100 MMOL/L (ref 98–107)
CHLORIDE SERPL-SCNC: 101 MMOL/L (ref 98–107)
CHLORIDE SERPL-SCNC: 102 MMOL/L (ref 98–107)
CHLORIDE SERPL-SCNC: 104 MMOL/L (ref 100–108)
CHLORIDE SERPL-SCNC: 104 MMOL/L (ref 98–107)
CHLORIDE SERPL-SCNC: 106 MMOL/L (ref 100–108)
CHLORIDE SERPL-SCNC: 106 MMOL/L (ref 98–107)
CHLORIDE SERPL-SCNC: 107 MMOL/L (ref 98–107)
CHLORIDE SERPL-SCNC: 109 MMOL/L (ref 98–107)
CHLORIDE SERPL-SCNC: 109 MMOL/L (ref 98–107)
CHLORIDE SERPL-SCNC: 96 MMOL/L (ref 100–108)
CHLORIDE SERPL-SCNC: 98 MMOL/L (ref 100–108)
CHLORIDE SERPL-SCNC: 99 MMOL/L (ref 98–107)
CHLORIDE SERPL-SCNC: 99 MMOL/L (ref 98–107)
CK MB SERPL-MCNC: 2.3 % (ref 0.6–6.3)
CK MB SERPL-MCNC: 3.2 NG/ML (ref 0.6–6.3)
CK SERPL-CCNC: 111 U/L (ref 30–192)
CK SERPL-CCNC: 142 U/L (ref 30–192)
CK SERPL-CCNC: 36 U/L (ref 30–192)
CK SERPL-CCNC: 40 U/L (ref 30–192)
CO2 SERPL-SCNC: 17 MMOL/L (ref 21–32)
CO2 SERPL-SCNC: 19 MMOL/L (ref 21–31)
CO2 SERPL-SCNC: 20 MMOL/L (ref 21–31)
CO2 SERPL-SCNC: 22 MMOL/L (ref 21–31)
CO2 SERPL-SCNC: 22 MMOL/L (ref 21–32)
CO2 SERPL-SCNC: 24 MMOL/L (ref 21–31)
CO2 SERPL-SCNC: 25 MMOL/L (ref 21–31)
CO2 SERPL-SCNC: 26 MMOL/L (ref 21–31)
CO2 SERPL-SCNC: 26 MMOL/L (ref 21–31)
CO2 SERPL-SCNC: 26 MMOL/L (ref 21–32)
CO2 SERPL-SCNC: 26 MMOL/L (ref 21–32)
CO2 SERPL-SCNC: 27 MMOL/L (ref 21–31)
CO2 SERPL-SCNC: 27 MMOL/L (ref 21–31)
CO2 SERPL-SCNC: 28 MMOL/L (ref 21–31)
CO2 SERPL-SCNC: 28 MMOL/L (ref 21–32)
CO2 SERPL-SCNC: 29 MMOL/L (ref 21–31)
CO2 SERPL-SCNC: 31 MMOL/L (ref 21–32)
CREAT SERPL-MCNC: 0.91 MG/DL (ref 0.6–1.2)
CREAT SERPL-MCNC: 0.92 MG/DL (ref 0.6–1.2)
CREAT SERPL-MCNC: 0.95 MG/DL (ref 0.6–1.2)
CREAT SERPL-MCNC: 0.96 MG/DL (ref 0.6–1.2)
CREAT SERPL-MCNC: 0.99 MG/DL (ref 0.6–1.2)
CREAT SERPL-MCNC: 1.03 MG/DL (ref 0.6–1.2)
CREAT SERPL-MCNC: 1.07 MG/DL (ref 0.6–1.3)
CREAT SERPL-MCNC: 1.07 MG/DL (ref 0.6–1.3)
CREAT SERPL-MCNC: 1.09 MG/DL (ref 0.6–1.2)
CREAT SERPL-MCNC: 1.16 MG/DL (ref 0.6–1.2)
CREAT SERPL-MCNC: 1.19 MG/DL (ref 0.6–1.2)
CREAT SERPL-MCNC: 1.2 MG/DL (ref 0.6–1.2)
CREAT SERPL-MCNC: 1.22 MG/DL (ref 0.6–1.2)
CREAT SERPL-MCNC: 1.24 MG/DL (ref 0.6–1.2)
CREAT SERPL-MCNC: 1.25 MG/DL (ref 0.6–1.2)
CREAT SERPL-MCNC: 1.32 MG/DL (ref 0.6–1.2)
CREAT SERPL-MCNC: 1.32 MG/DL (ref 0.6–1.3)
CREAT SERPL-MCNC: 1.41 MG/DL (ref 0.6–1.3)
CREAT SERPL-MCNC: 2.15 MG/DL (ref 0.6–1.3)
CREAT SERPL-MCNC: 2.64 MG/DL (ref 0.6–1.3)
CREAT UR-MCNC: 70.5 MG/DL
CRP SERPL QL: 12.9 MG/L
CRP SERPL QL: 17.4 MG/L
CRP SERPL QL: 20.5 MG/L
CRP SERPL QL: 28.6 MG/L
CRP SERPL QL: 31.7 MG/L
CRP SERPL QL: 5.2 MG/L
CRP SERPL QL: 53.4 MG/L
CRP SERPL QL: 54.3 MG/L
CRP SERPL QL: 62.3 MG/L
CRP SERPL QL: 7.6 MG/L
D DIMER PPP FEU-MCNC: 0.72 UG/ML FEU
D DIMER PPP FEU-MCNC: 0.76 UG/ML FEU
D DIMER PPP FEU-MCNC: 0.79 UG/ML FEU
D DIMER PPP FEU-MCNC: 0.81 UG/ML FEU
D DIMER PPP FEU-MCNC: 0.82 UG/ML FEU
D DIMER PPP FEU-MCNC: 1.07 UG/ML FEU
D DIMER PPP FEU-MCNC: 2.11 UG/ML FEU
EOSINOPHIL # BLD AUTO: 0 THOUSAND/ΜL (ref 0–0.61)
EOSINOPHIL # BLD AUTO: 0.1 THOUSAND/ΜL (ref 0–0.61)
EOSINOPHIL # BLD AUTO: 0.21 THOUSAND/ΜL (ref 0–0.61)
EOSINOPHIL NFR BLD AUTO: 0 % (ref 0–5)
EOSINOPHIL NFR BLD AUTO: 0 % (ref 0–6)
EOSINOPHIL NFR BLD AUTO: 0 % (ref 0–6)
EOSINOPHIL NFR BLD AUTO: 3 % (ref 0–6)
EOSINOPHIL NFR URNS MANUAL: 0 %
ERYTHROCYTE [DISTWIDTH] IN BLOOD BY AUTOMATED COUNT: 14.5 % (ref 11.5–14.5)
ERYTHROCYTE [DISTWIDTH] IN BLOOD BY AUTOMATED COUNT: 14.9 % (ref 11.5–14.5)
ERYTHROCYTE [DISTWIDTH] IN BLOOD BY AUTOMATED COUNT: 14.9 % (ref 11.5–14.5)
ERYTHROCYTE [DISTWIDTH] IN BLOOD BY AUTOMATED COUNT: 15.3 % (ref 11.5–14.5)
ERYTHROCYTE [DISTWIDTH] IN BLOOD BY AUTOMATED COUNT: 15.4 % (ref 11.5–14.5)
ERYTHROCYTE [DISTWIDTH] IN BLOOD BY AUTOMATED COUNT: 15.5 % (ref 11.5–14.5)
ERYTHROCYTE [DISTWIDTH] IN BLOOD BY AUTOMATED COUNT: 15.5 % (ref 11.5–14.5)
ERYTHROCYTE [DISTWIDTH] IN BLOOD BY AUTOMATED COUNT: 15.6 % (ref 11.5–14.5)
ERYTHROCYTE [DISTWIDTH] IN BLOOD BY AUTOMATED COUNT: 15.7 % (ref 11.6–15.1)
ERYTHROCYTE [DISTWIDTH] IN BLOOD BY AUTOMATED COUNT: 15.7 % (ref 11.6–15.1)
ERYTHROCYTE [DISTWIDTH] IN BLOOD BY AUTOMATED COUNT: 15.8 % (ref 11.5–14.5)
ERYTHROCYTE [DISTWIDTH] IN BLOOD BY AUTOMATED COUNT: 15.8 % (ref 11.5–14.5)
ERYTHROCYTE [DISTWIDTH] IN BLOOD BY AUTOMATED COUNT: 16 % (ref 11.5–14.5)
ERYTHROCYTE [DISTWIDTH] IN BLOOD BY AUTOMATED COUNT: 16 % (ref 11.5–14.5)
ERYTHROCYTE [DISTWIDTH] IN BLOOD BY AUTOMATED COUNT: 16.2 % (ref 11.5–14.5)
ERYTHROCYTE [DISTWIDTH] IN BLOOD BY AUTOMATED COUNT: 16.9 % (ref 11.6–15.1)
FERRITIN SERPL-MCNC: 1146 NG/ML (ref 8–388)
FERRITIN SERPL-MCNC: 1159 NG/ML (ref 8–388)
FERRITIN SERPL-MCNC: 1192 NG/ML (ref 8–388)
FERRITIN SERPL-MCNC: 1478 NG/ML (ref 8–388)
FERRITIN SERPL-MCNC: 1566 NG/ML (ref 8–388)
FERRITIN SERPL-MCNC: 1717 NG/ML (ref 8–388)
FERRITIN SERPL-MCNC: 895 NG/ML (ref 8–388)
FIBRINOGEN PPP-MCNC: 398 MG/DL (ref 227–495)
FLUAV RNA RESP QL NAA+PROBE: NEGATIVE
FLUBV RNA RESP QL NAA+PROBE: NEGATIVE
GFR SERPL CREATININE-BSD FRML MDRD: 17 ML/MIN/1.73SQ M
GFR SERPL CREATININE-BSD FRML MDRD: 22 ML/MIN/1.73SQ M
GFR SERPL CREATININE-BSD FRML MDRD: 37 ML/MIN/1.73SQ M
GFR SERPL CREATININE-BSD FRML MDRD: 40 ML/MIN/1.73SQ M
GFR SERPL CREATININE-BSD FRML MDRD: 40 ML/MIN/1.73SQ M
GFR SERPL CREATININE-BSD FRML MDRD: 42 ML/MIN/1.73SQ M
GFR SERPL CREATININE-BSD FRML MDRD: 43 ML/MIN/1.73SQ M
GFR SERPL CREATININE-BSD FRML MDRD: 44 ML/MIN/1.73SQ M
GFR SERPL CREATININE-BSD FRML MDRD: 45 ML/MIN/1.73SQ M
GFR SERPL CREATININE-BSD FRML MDRD: 45 ML/MIN/1.73SQ M
GFR SERPL CREATININE-BSD FRML MDRD: 46 ML/MIN/1.73SQ M
GFR SERPL CREATININE-BSD FRML MDRD: 50 ML/MIN/1.73SQ M
GFR SERPL CREATININE-BSD FRML MDRD: 52 ML/MIN/1.73SQ M
GFR SERPL CREATININE-BSD FRML MDRD: 52 ML/MIN/1.73SQ M
GFR SERPL CREATININE-BSD FRML MDRD: 54 ML/MIN/1.73SQ M
GFR SERPL CREATININE-BSD FRML MDRD: 56 ML/MIN/1.73SQ M
GFR SERPL CREATININE-BSD FRML MDRD: 58 ML/MIN/1.73SQ M
GFR SERPL CREATININE-BSD FRML MDRD: 59 ML/MIN/1.73SQ M
GFR SERPL CREATININE-BSD FRML MDRD: 62 ML/MIN/1.73SQ M
GFR SERPL CREATININE-BSD FRML MDRD: 62 ML/MIN/1.73SQ M
GLUCOSE P FAST SERPL-MCNC: 118 MG/DL (ref 65–99)
GLUCOSE P FAST SERPL-MCNC: 92 MG/DL (ref 65–99)
GLUCOSE SERPL-MCNC: 102 MG/DL (ref 65–99)
GLUCOSE SERPL-MCNC: 103 MG/DL (ref 65–140)
GLUCOSE SERPL-MCNC: 104 MG/DL (ref 65–99)
GLUCOSE SERPL-MCNC: 107 MG/DL (ref 65–140)
GLUCOSE SERPL-MCNC: 111 MG/DL (ref 65–99)
GLUCOSE SERPL-MCNC: 112 MG/DL (ref 65–140)
GLUCOSE SERPL-MCNC: 112 MG/DL (ref 65–99)
GLUCOSE SERPL-MCNC: 114 MG/DL (ref 65–99)
GLUCOSE SERPL-MCNC: 122 MG/DL (ref 65–99)
GLUCOSE SERPL-MCNC: 123 MG/DL (ref 65–99)
GLUCOSE SERPL-MCNC: 132 MG/DL (ref 65–99)
GLUCOSE SERPL-MCNC: 137 MG/DL (ref 65–99)
GLUCOSE SERPL-MCNC: 144 MG/DL (ref 65–99)
GLUCOSE SERPL-MCNC: 145 MG/DL (ref 65–99)
GLUCOSE SERPL-MCNC: 152 MG/DL (ref 65–99)
GLUCOSE SERPL-MCNC: 157 MG/DL (ref 65–140)
GLUCOSE SERPL-MCNC: 157 MG/DL (ref 65–99)
GLUCOSE SERPL-MCNC: 160 MG/DL (ref 65–140)
GLUCOSE SERPL-MCNC: 290 MG/DL (ref 65–140)
GLUCOSE SERPL-MCNC: 86 MG/DL (ref 65–140)
GLUCOSE SERPL-MCNC: 93 MG/DL (ref 65–99)
HCT VFR BLD AUTO: 34 % (ref 34.8–46.1)
HCT VFR BLD AUTO: 40.5 % (ref 42–47)
HCT VFR BLD AUTO: 41.5 % (ref 42–47)
HCT VFR BLD AUTO: 42.9 % (ref 42–47)
HCT VFR BLD AUTO: 43.1 % (ref 34.8–46.1)
HCT VFR BLD AUTO: 43.3 % (ref 34.8–46.1)
HCT VFR BLD AUTO: 43.5 % (ref 42–47)
HCT VFR BLD AUTO: 44.7 % (ref 42–47)
HCT VFR BLD AUTO: 45.3 % (ref 42–47)
HCT VFR BLD AUTO: 45.6 % (ref 42–47)
HCT VFR BLD AUTO: 48 % (ref 42–47)
HCT VFR BLD AUTO: 48 % (ref 42–47)
HCT VFR BLD AUTO: 48.2 % (ref 42–47)
HCT VFR BLD AUTO: 53.3 % (ref 42–47)
HCT VFR BLD AUTO: 53.3 % (ref 42–47)
HCT VFR BLD AUTO: 53.8 % (ref 42–47)
HGB BLD-MCNC: 11 G/DL (ref 11.5–15.4)
HGB BLD-MCNC: 13.4 G/DL (ref 11.5–15.4)
HGB BLD-MCNC: 13.6 G/DL (ref 12–16)
HGB BLD-MCNC: 13.7 G/DL (ref 11.5–15.4)
HGB BLD-MCNC: 13.8 G/DL (ref 12–16)
HGB BLD-MCNC: 14.4 G/DL (ref 12–16)
HGB BLD-MCNC: 14.5 G/DL (ref 12–16)
HGB BLD-MCNC: 14.8 G/DL (ref 12–16)
HGB BLD-MCNC: 15.1 G/DL (ref 12–16)
HGB BLD-MCNC: 15.5 G/DL (ref 12–16)
HGB BLD-MCNC: 15.7 G/DL (ref 12–16)
HGB BLD-MCNC: 16 G/DL (ref 12–16)
HGB BLD-MCNC: 16.6 G/DL (ref 12–16)
HGB BLD-MCNC: 17.4 G/DL (ref 12–16)
HGB BLD-MCNC: 17.7 G/DL (ref 12–16)
HGB BLD-MCNC: 17.8 G/DL (ref 12–16)
IL6 SERPL-MCNC: 79.4 PG/ML (ref 0–13)
IMM GRANULOCYTES # BLD AUTO: 0.02 THOUSAND/UL (ref 0–0.2)
IMM GRANULOCYTES # BLD AUTO: 0.03 THOUSAND/UL (ref 0–0.2)
IMM GRANULOCYTES # BLD AUTO: >0.5 THOUSAND/UL (ref 0–0.2)
IMM GRANULOCYTES NFR BLD AUTO: 0 % (ref 0–2)
IMM GRANULOCYTES NFR BLD AUTO: 1 % (ref 0–2)
IMM GRANULOCYTES NFR BLD AUTO: 2 % (ref 0–2)
INR PPP: 1.76 (ref 0.84–1.19)
LACTATE SERPL-SCNC: 0.9 MMOL/L (ref 0.5–2)
LYMPHOCYTES # BLD AUTO: 0.3 THOUSANDS/ΜL (ref 0.6–4.47)
LYMPHOCYTES # BLD AUTO: 0.4 THOUSANDS/ΜL (ref 0.6–4.47)
LYMPHOCYTES # BLD AUTO: 0.6 THOUSANDS/ΜL (ref 0.6–4.47)
LYMPHOCYTES # BLD AUTO: 0.6 THOUSANDS/ΜL (ref 0.6–4.47)
LYMPHOCYTES # BLD AUTO: 0.8 THOUSANDS/ΜL (ref 0.6–4.47)
LYMPHOCYTES # BLD AUTO: 0.99 THOUSANDS/ΜL (ref 0.6–4.47)
LYMPHOCYTES # BLD AUTO: 1.03 THOUSANDS/ΜL (ref 0.6–4.47)
LYMPHOCYTES # BLD AUTO: 1.1 THOUSANDS/ΜL (ref 0.6–4.47)
LYMPHOCYTES # BLD AUTO: 1.2 THOUSANDS/ΜL (ref 0.6–4.47)
LYMPHOCYTES # BLD AUTO: 1.4 THOUSANDS/ΜL (ref 0.6–4.47)
LYMPHOCYTES # BLD AUTO: 1.9 THOUSANDS/ΜL (ref 0.6–4.47)
LYMPHOCYTES # BLD AUTO: 12 % (ref 20–51)
LYMPHOCYTES # BLD AUTO: 2.1 THOUSANDS/ΜL (ref 0.6–4.47)
LYMPHOCYTES # BLD AUTO: 2.1 THOUSANDS/ΜL (ref 0.6–4.47)
LYMPHOCYTES # BLD AUTO: 2.76 THOUSAND/UL (ref 0.6–4.47)
LYMPHOCYTES NFR BLD AUTO: 10 % (ref 21–51)
LYMPHOCYTES NFR BLD AUTO: 11 % (ref 21–51)
LYMPHOCYTES NFR BLD AUTO: 11 % (ref 21–51)
LYMPHOCYTES NFR BLD AUTO: 12 % (ref 21–51)
LYMPHOCYTES NFR BLD AUTO: 13 % (ref 21–51)
LYMPHOCYTES NFR BLD AUTO: 14 % (ref 14–44)
LYMPHOCYTES NFR BLD AUTO: 18 % (ref 21–51)
LYMPHOCYTES NFR BLD AUTO: 23 % (ref 14–44)
LYMPHOCYTES NFR BLD AUTO: 5 % (ref 14–44)
LYMPHOCYTES NFR BLD AUTO: 7 % (ref 21–51)
LYMPHOCYTES NFR BLD AUTO: 9 % (ref 21–51)
MAGNESIUM SERPL-MCNC: 1.8 MG/DL (ref 1.9–2.7)
MCH RBC QN AUTO: 28.5 PG (ref 26–34)
MCH RBC QN AUTO: 29 PG (ref 26.8–34.3)
MCH RBC QN AUTO: 29.1 PG (ref 26–34)
MCH RBC QN AUTO: 29.4 PG (ref 26–34)
MCH RBC QN AUTO: 29.6 PG (ref 26–34)
MCH RBC QN AUTO: 29.7 PG (ref 26–34)
MCH RBC QN AUTO: 29.9 PG (ref 26–34)
MCH RBC QN AUTO: 30 PG (ref 26–34)
MCH RBC QN AUTO: 30.1 PG (ref 26.8–34.3)
MCH RBC QN AUTO: 30.3 PG (ref 26–34)
MCH RBC QN AUTO: 30.8 PG (ref 26.8–34.3)
MCHC RBC AUTO-ENTMCNC: 31.1 G/DL (ref 31.4–37.4)
MCHC RBC AUTO-ENTMCNC: 31.6 G/DL (ref 31.4–37.4)
MCHC RBC AUTO-ENTMCNC: 32.4 G/DL (ref 31.4–37.4)
MCHC RBC AUTO-ENTMCNC: 32.7 G/DL (ref 31–37)
MCHC RBC AUTO-ENTMCNC: 33.1 G/DL (ref 31–37)
MCHC RBC AUTO-ENTMCNC: 33.1 G/DL (ref 31–37)
MCHC RBC AUTO-ENTMCNC: 33.2 G/DL (ref 31–37)
MCHC RBC AUTO-ENTMCNC: 33.2 G/DL (ref 31–37)
MCHC RBC AUTO-ENTMCNC: 33.4 G/DL (ref 31–37)
MCHC RBC AUTO-ENTMCNC: 33.5 G/DL (ref 31–37)
MCHC RBC AUTO-ENTMCNC: 33.7 G/DL (ref 31–37)
MCHC RBC AUTO-ENTMCNC: 33.8 G/DL (ref 31–37)
MCHC RBC AUTO-ENTMCNC: 34 G/DL (ref 31–37)
MCHC RBC AUTO-ENTMCNC: 34.6 G/DL (ref 31–37)
MCV RBC AUTO: 87 FL (ref 81–99)
MCV RBC AUTO: 87 FL (ref 81–99)
MCV RBC AUTO: 88 FL (ref 81–99)
MCV RBC AUTO: 89 FL (ref 81–99)
MCV RBC AUTO: 90 FL (ref 81–99)
MCV RBC AUTO: 91 FL (ref 81–99)
MCV RBC AUTO: 91 FL (ref 81–99)
MCV RBC AUTO: 93 FL (ref 82–98)
MCV RBC AUTO: 95 FL (ref 82–98)
MCV RBC AUTO: 95 FL (ref 82–98)
MONOCYTES # BLD AUTO: 0.3 THOUSAND/ΜL (ref 0.17–1.22)
MONOCYTES # BLD AUTO: 0.4 THOUSAND/ΜL (ref 0.17–1.22)
MONOCYTES # BLD AUTO: 0.5 THOUSAND/ΜL (ref 0.17–1.22)
MONOCYTES # BLD AUTO: 0.77 THOUSAND/ΜL (ref 0.17–1.22)
MONOCYTES # BLD AUTO: 0.77 THOUSAND/ΜL (ref 0.17–1.22)
MONOCYTES # BLD AUTO: 1.2 THOUSAND/ΜL (ref 0.17–1.22)
MONOCYTES # BLD AUTO: 1.2 THOUSAND/ΜL (ref 0.17–1.22)
MONOCYTES # BLD AUTO: 1.6 THOUSAND/ΜL (ref 0.17–1.22)
MONOCYTES # BLD AUTO: 1.84 THOUSAND/UL (ref 0–1.22)
MONOCYTES # BLD AUTO: 1.95 THOUSAND/ΜL (ref 0.17–1.22)
MONOCYTES # BLD AUTO: 2 THOUSAND/ΜL (ref 0.17–1.22)
MONOCYTES # BLD AUTO: 2.3 THOUSAND/ΜL (ref 0.17–1.22)
MONOCYTES # BLD AUTO: 2.4 THOUSAND/ΜL (ref 0.17–1.22)
MONOCYTES # BLD AUTO: 2.4 THOUSAND/ΜL (ref 0.17–1.22)
MONOCYTES NFR BLD AUTO: 11 % (ref 4–12)
MONOCYTES NFR BLD AUTO: 12 % (ref 2–12)
MONOCYTES NFR BLD AUTO: 13 % (ref 2–12)
MONOCYTES NFR BLD AUTO: 14 % (ref 2–12)
MONOCYTES NFR BLD AUTO: 16 % (ref 2–12)
MONOCYTES NFR BLD AUTO: 16 % (ref 4–12)
MONOCYTES NFR BLD AUTO: 17 % (ref 2–12)
MONOCYTES NFR BLD AUTO: 8 % (ref 4–12)
MONOCYTES NFR BLD AUTO: 9 % (ref 2–12)
MONOCYTES NFR BLD AUTO: 9 % (ref 4–12)
NEUTROPHILS # BLD AUTO: 10.3 THOUSANDS/ΜL (ref 1.4–6.5)
NEUTROPHILS # BLD AUTO: 10.7 THOUSANDS/ΜL (ref 1.4–6.5)
NEUTROPHILS # BLD AUTO: 10.9 THOUSANDS/ΜL (ref 1.4–6.5)
NEUTROPHILS # BLD AUTO: 13.8 THOUSANDS/ΜL (ref 1.4–6.5)
NEUTROPHILS # BLD AUTO: 14.3 THOUSANDS/ΜL (ref 1.4–6.5)
NEUTROPHILS # BLD AUTO: 17.69 THOUSANDS/ΜL (ref 1.85–7.62)
NEUTROPHILS # BLD AUTO: 2.1 THOUSANDS/ΜL (ref 1.4–6.5)
NEUTROPHILS # BLD AUTO: 2.5 THOUSANDS/ΜL (ref 1.4–6.5)
NEUTROPHILS # BLD AUTO: 2.95 THOUSANDS/ΜL (ref 1.85–7.62)
NEUTROPHILS # BLD AUTO: 3 THOUSANDS/ΜL (ref 1.4–6.5)
NEUTROPHILS # BLD AUTO: 5.1 THOUSANDS/ΜL (ref 1.85–7.62)
NEUTROPHILS # BLD AUTO: 6.7 THOUSANDS/ΜL (ref 1.4–6.5)
NEUTROPHILS # BLD AUTO: 6.8 THOUSANDS/ΜL (ref 1.4–6.5)
NEUTS SEG # BLD: 18.4 THOUSAND/UL (ref 1.81–6.82)
NEUTS SEG NFR BLD AUTO: 60 % (ref 43–75)
NEUTS SEG NFR BLD AUTO: 65 % (ref 42–75)
NEUTS SEG NFR BLD AUTO: 71 % (ref 43–75)
NEUTS SEG NFR BLD AUTO: 73 % (ref 42–75)
NEUTS SEG NFR BLD AUTO: 74 % (ref 42–75)
NEUTS SEG NFR BLD AUTO: 75 % (ref 42–75)
NEUTS SEG NFR BLD AUTO: 76 % (ref 42–75)
NEUTS SEG NFR BLD AUTO: 77 % (ref 42–75)
NEUTS SEG NFR BLD AUTO: 79 % (ref 42–75)
NEUTS SEG NFR BLD AUTO: 80 % (ref 42–75)
NEUTS SEG NFR BLD AUTO: 80 % (ref 42–75)
NEUTS SEG NFR BLD AUTO: 84 % (ref 43–75)
NRBC BLD AUTO-RTO: 0 /100 WBCS
NT-PROBNP SERPL-MCNC: 1139 PG/ML
NT-PROBNP SERPL-MCNC: 2745 PG/ML
NT-PROBNP SERPL-MCNC: 3215 PG/ML
NT-PROBNP SERPL-MCNC: 5649 PG/ML
NT-PROBNP SERPL-MCNC: 6758 PG/ML
NT-PROBNP SERPL-MCNC: 996 PG/ML
PLATELET # BLD AUTO: 153 THOUSANDS/UL (ref 149–390)
PLATELET # BLD AUTO: 156 THOUSANDS/UL (ref 149–390)
PLATELET # BLD AUTO: 157 THOUSANDS/UL (ref 149–390)
PLATELET # BLD AUTO: 177 THOUSANDS/UL (ref 149–390)
PLATELET # BLD AUTO: 209 THOUSANDS/UL (ref 149–390)
PLATELET # BLD AUTO: 214 THOUSANDS/UL (ref 149–390)
PLATELET # BLD AUTO: 257 THOUSANDS/UL (ref 149–390)
PLATELET # BLD AUTO: 258 THOUSANDS/UL (ref 149–390)
PLATELET # BLD AUTO: 294 THOUSANDS/UL (ref 149–390)
PLATELET # BLD AUTO: 298 THOUSANDS/UL (ref 149–390)
PLATELET # BLD AUTO: 300 THOUSANDS/UL (ref 149–390)
PLATELET # BLD AUTO: 316 THOUSANDS/UL (ref 149–390)
PLATELET # BLD AUTO: 402 THOUSANDS/UL (ref 149–390)
PLATELET # BLD AUTO: 423 THOUSANDS/UL (ref 149–390)
PLATELET # BLD AUTO: 456 THOUSANDS/UL (ref 149–390)
PLATELET # BLD AUTO: 502 THOUSANDS/UL (ref 149–390)
PLATELET # BLD AUTO: 543 THOUSANDS/UL (ref 149–390)
PLATELET BLD QL SMEAR: ABNORMAL
PMV BLD AUTO: 10.2 FL (ref 8.9–12.7)
PMV BLD AUTO: 10.3 FL (ref 8.6–11.7)
PMV BLD AUTO: 10.5 FL (ref 8.9–12.7)
PMV BLD AUTO: 11.2 FL (ref 8.9–12.7)
PMV BLD AUTO: 8.5 FL (ref 8.6–11.7)
PMV BLD AUTO: 8.6 FL (ref 8.6–11.7)
PMV BLD AUTO: 8.6 FL (ref 8.6–11.7)
PMV BLD AUTO: 8.7 FL (ref 8.6–11.7)
PMV BLD AUTO: 8.9 FL (ref 8.6–11.7)
PMV BLD AUTO: 9.2 FL (ref 8.6–11.7)
PMV BLD AUTO: 9.3 FL (ref 8.6–11.7)
PMV BLD AUTO: 9.4 FL (ref 8.6–11.7)
PMV BLD AUTO: 9.6 FL (ref 8.6–11.7)
POTASSIUM SERPL-SCNC: 3.4 MMOL/L (ref 3.5–5.5)
POTASSIUM SERPL-SCNC: 3.5 MMOL/L (ref 3.5–5.5)
POTASSIUM SERPL-SCNC: 3.6 MMOL/L (ref 3.5–5.5)
POTASSIUM SERPL-SCNC: 3.8 MMOL/L (ref 3.5–5.5)
POTASSIUM SERPL-SCNC: 3.9 MMOL/L (ref 3.5–5.3)
POTASSIUM SERPL-SCNC: 4 MMOL/L (ref 3.5–5.3)
POTASSIUM SERPL-SCNC: 4 MMOL/L (ref 3.5–5.5)
POTASSIUM SERPL-SCNC: 4.1 MMOL/L (ref 3.5–5.3)
POTASSIUM SERPL-SCNC: 4.2 MMOL/L (ref 3.5–5.3)
POTASSIUM SERPL-SCNC: 4.2 MMOL/L (ref 3.5–5.5)
POTASSIUM SERPL-SCNC: 4.3 MMOL/L (ref 3.5–5.3)
POTASSIUM SERPL-SCNC: 4.4 MMOL/L (ref 3.5–5.5)
POTASSIUM SERPL-SCNC: 4.7 MMOL/L (ref 3.5–5.5)
POTASSIUM SERPL-SCNC: 4.8 MMOL/L (ref 3.5–5.5)
POTASSIUM SERPL-SCNC: 5 MMOL/L (ref 3.5–5.5)
POTASSIUM SERPL-SCNC: 5.2 MMOL/L (ref 3.5–5.5)
POTASSIUM SERPL-SCNC: 5.6 MMOL/L (ref 3.5–5.3)
PROCALCITONIN SERPL-MCNC: 0.1 NG/ML
PROCALCITONIN SERPL-MCNC: 0.12 NG/ML
PROCALCITONIN SERPL-MCNC: 0.13 NG/ML
PROCALCITONIN SERPL-MCNC: 0.14 NG/ML
PROCALCITONIN SERPL-MCNC: 0.15 NG/ML
PROCALCITONIN SERPL-MCNC: 0.15 NG/ML
PROCALCITONIN SERPL-MCNC: 0.17 NG/ML
PROCALCITONIN SERPL-MCNC: 0.19 NG/ML
PROCALCITONIN SERPL-MCNC: 0.19 NG/ML
PROCALCITONIN SERPL-MCNC: 0.25 NG/ML
PROT SERPL-MCNC: 6.2 G/DL (ref 6.4–8.9)
PROT SERPL-MCNC: 6.3 G/DL (ref 6.4–8.9)
PROT SERPL-MCNC: 6.3 G/DL (ref 6.4–8.9)
PROT SERPL-MCNC: 6.5 G/DL (ref 6.4–8.9)
PROT SERPL-MCNC: 6.6 G/DL (ref 6.4–8.9)
PROT SERPL-MCNC: 6.7 G/DL (ref 6.4–8.2)
PROT SERPL-MCNC: 6.8 G/DL (ref 6.4–8.9)
PROT SERPL-MCNC: 7 G/DL (ref 6.4–8.9)
PROT SERPL-MCNC: 7.3 G/DL (ref 6.4–8.9)
PROT SERPL-MCNC: 7.3 G/DL (ref 6.4–8.9)
PROT SERPL-MCNC: 7.5 G/DL (ref 6.4–8.2)
PROTHROMBIN TIME: 20.3 SECONDS (ref 11.6–14.5)
QRS AXIS: -63 DEGREES
QRSD INTERVAL: 116 MS
QT INTERVAL: 380 MS
QTC INTERVAL: 435 MS
RBC # BLD AUTO: 3.57 MILLION/UL (ref 3.81–5.12)
RBC # BLD AUTO: 4.48 MILLION/UL (ref 3.9–5.2)
RBC # BLD AUTO: 4.55 MILLION/UL (ref 3.81–5.12)
RBC # BLD AUTO: 4.6 MILLION/UL (ref 3.9–5.2)
RBC # BLD AUTO: 4.62 MILLION/UL (ref 3.81–5.12)
RBC # BLD AUTO: 4.82 MILLION/UL (ref 3.9–5.2)
RBC # BLD AUTO: 4.85 MILLION/UL (ref 3.9–5.2)
RBC # BLD AUTO: 5.05 MILLION/UL (ref 3.9–5.2)
RBC # BLD AUTO: 5.07 MILLION/UL (ref 3.9–5.2)
RBC # BLD AUTO: 5.18 MILLION/UL (ref 3.9–5.2)
RBC # BLD AUTO: 5.32 MILLION/UL (ref 3.9–5.2)
RBC # BLD AUTO: 5.4 MILLION/UL (ref 3.9–5.2)
RBC # BLD AUTO: 5.55 MILLION/UL (ref 3.9–5.2)
RBC # BLD AUTO: 5.95 MILLION/UL (ref 3.9–5.2)
RBC # BLD AUTO: 6.11 MILLION/UL (ref 3.9–5.2)
RBC # BLD AUTO: 6.12 MILLION/UL (ref 3.9–5.2)
RBC MORPH BLD: NORMAL
RH BLD: POSITIVE
RSV RNA RESP QL NAA+PROBE: NEGATIVE
SARS-COV-2 RNA RESP QL NAA+PROBE: POSITIVE
SODIUM 24H UR-SCNC: 11 MOL/L
SODIUM SERPL-SCNC: 128 MMOL/L (ref 136–145)
SODIUM SERPL-SCNC: 132 MMOL/L (ref 136–145)
SODIUM SERPL-SCNC: 133 MMOL/L (ref 134–143)
SODIUM SERPL-SCNC: 134 MMOL/L (ref 134–143)
SODIUM SERPL-SCNC: 135 MMOL/L (ref 136–145)
SODIUM SERPL-SCNC: 136 MMOL/L (ref 134–143)
SODIUM SERPL-SCNC: 137 MMOL/L (ref 134–143)
SODIUM SERPL-SCNC: 137 MMOL/L (ref 134–143)
SODIUM SERPL-SCNC: 138 MMOL/L (ref 136–145)
SODIUM SERPL-SCNC: 139 MMOL/L (ref 134–143)
SODIUM SERPL-SCNC: 139 MMOL/L (ref 134–143)
SODIUM SERPL-SCNC: 140 MMOL/L (ref 134–143)
SODIUM SERPL-SCNC: 140 MMOL/L (ref 136–145)
SODIUM SERPL-SCNC: 141 MMOL/L (ref 134–143)
SODIUM SERPL-SCNC: 141 MMOL/L (ref 134–143)
SODIUM SERPL-SCNC: 141 MMOL/L (ref 136–145)
SP GR UR STRIP.AUTO: 1.01 (ref 1–1.03)
SPECIMEN EXPIRATION DATE: NORMAL
T WAVE AXIS: 80 DEGREES
TOTAL CELLS COUNTED SPEC: 100
TRIGL SERPL-MCNC: 116 MG/DL (ref 44–166)
TROPONIN I SERPL-MCNC: 0.05 NG/ML
TROPONIN I SERPL-MCNC: 0.05 NG/ML
TROPONIN I SERPL-MCNC: <0.02 NG/ML
UNIT DISPENSE STATUS: NORMAL
UNIT PRODUCT CODE: NORMAL
UNIT RH: NORMAL
UUN 24H UR-MCNC: 1397 MG/DL
VENTRICULAR RATE: 79 BPM
WBC # BLD AUTO: 13.2 THOUSAND/UL (ref 4.8–10.8)
WBC # BLD AUTO: 14.7 THOUSAND/UL (ref 4.8–10.8)
WBC # BLD AUTO: 14.9 THOUSAND/UL (ref 4.8–10.8)
WBC # BLD AUTO: 16 THOUSAND/UL (ref 4.8–10.8)
WBC # BLD AUTO: 18.6 THOUSAND/UL (ref 4.8–10.8)
WBC # BLD AUTO: 18.8 THOUSAND/UL (ref 4.8–10.8)
WBC # BLD AUTO: 21.23 THOUSAND/UL (ref 4.31–10.16)
WBC # BLD AUTO: 23 THOUSAND/UL (ref 4.8–10.8)
WBC # BLD AUTO: 23 THOUSAND/UL (ref 4.8–10.8)
WBC # BLD AUTO: 3.1 THOUSAND/UL (ref 4.8–10.8)
WBC # BLD AUTO: 3.3 THOUSAND/UL (ref 4.8–10.8)
WBC # BLD AUTO: 3.8 THOUSAND/UL (ref 4.8–10.8)
WBC # BLD AUTO: 4.86 THOUSAND/UL (ref 4.31–10.16)
WBC # BLD AUTO: 7.14 THOUSAND/UL (ref 4.31–10.16)
WBC # BLD AUTO: 8.6 THOUSAND/UL (ref 4.8–10.8)
WBC # BLD AUTO: 8.7 THOUSAND/UL (ref 4.8–10.8)

## 2020-01-01 PROCEDURE — 99291 CRITICAL CARE FIRST HOUR: CPT | Performed by: ANESTHESIOLOGY

## 2020-01-01 PROCEDURE — 85025 COMPLETE CBC W/AUTO DIFF WBC: CPT

## 2020-01-01 PROCEDURE — 99214 OFFICE O/P EST MOD 30 MIN: CPT | Performed by: PHYSICIAN ASSISTANT

## 2020-01-01 PROCEDURE — 87205 SMEAR GRAM STAIN: CPT | Performed by: ANESTHESIOLOGY

## 2020-01-01 PROCEDURE — 84145 PROCALCITONIN (PCT): CPT | Performed by: HOSPITALIST

## 2020-01-01 PROCEDURE — 71045 X-RAY EXAM CHEST 1 VIEW: CPT

## 2020-01-01 PROCEDURE — 99233 SBSQ HOSP IP/OBS HIGH 50: CPT | Performed by: HOSPITALIST

## 2020-01-01 PROCEDURE — 85379 FIBRIN DEGRADATION QUANT: CPT | Performed by: HOSPITALIST

## 2020-01-01 PROCEDURE — 94760 N-INVAS EAR/PLS OXIMETRY 1: CPT

## 2020-01-01 PROCEDURE — 99223 1ST HOSP IP/OBS HIGH 75: CPT | Performed by: INTERNAL MEDICINE

## 2020-01-01 PROCEDURE — 85384 FIBRINOGEN ACTIVITY: CPT | Performed by: ANESTHESIOLOGY

## 2020-01-01 PROCEDURE — 83880 ASSAY OF NATRIURETIC PEPTIDE: CPT | Performed by: NURSE PRACTITIONER

## 2020-01-01 PROCEDURE — 36415 COLL VENOUS BLD VENIPUNCTURE: CPT | Performed by: PHYSICIAN ASSISTANT

## 2020-01-01 PROCEDURE — 82553 CREATINE MB FRACTION: CPT | Performed by: NURSE PRACTITIONER

## 2020-01-01 PROCEDURE — G1004 CDSM NDSC: HCPCS

## 2020-01-01 PROCEDURE — 99232 SBSQ HOSP IP/OBS MODERATE 35: CPT | Performed by: INTERNAL MEDICINE

## 2020-01-01 PROCEDURE — 97530 THERAPEUTIC ACTIVITIES: CPT

## 2020-01-01 PROCEDURE — 97165 OT EVAL LOW COMPLEX 30 MIN: CPT

## 2020-01-01 PROCEDURE — 94003 VENT MGMT INPAT SUBQ DAY: CPT

## 2020-01-01 PROCEDURE — 97110 THERAPEUTIC EXERCISES: CPT

## 2020-01-01 PROCEDURE — 74176 CT ABD & PELVIS W/O CONTRAST: CPT

## 2020-01-01 PROCEDURE — 94664 DEMO&/EVAL PT USE INHALER: CPT

## 2020-01-01 PROCEDURE — 99239 HOSP IP/OBS DSCHRG MGMT >30: CPT | Performed by: PHYSICIAN ASSISTANT

## 2020-01-01 PROCEDURE — 97116 GAIT TRAINING THERAPY: CPT

## 2020-01-01 PROCEDURE — 99231 SBSQ HOSP IP/OBS SF/LOW 25: CPT | Performed by: INTERNAL MEDICINE

## 2020-01-01 PROCEDURE — 84145 PROCALCITONIN (PCT): CPT | Performed by: ANESTHESIOLOGY

## 2020-01-01 PROCEDURE — 72192 CT PELVIS W/O DYE: CPT

## 2020-01-01 PROCEDURE — 96361 HYDRATE IV INFUSION ADD-ON: CPT

## 2020-01-01 PROCEDURE — 82948 REAGENT STRIP/BLOOD GLUCOSE: CPT

## 2020-01-01 PROCEDURE — 82550 ASSAY OF CK (CPK): CPT | Performed by: NURSE PRACTITIONER

## 2020-01-01 PROCEDURE — 83880 ASSAY OF NATRIURETIC PEPTIDE: CPT | Performed by: INTERNAL MEDICINE

## 2020-01-01 PROCEDURE — 80053 COMPREHEN METABOLIC PANEL: CPT | Performed by: HOSPITALIST

## 2020-01-01 PROCEDURE — 85007 BL SMEAR W/DIFF WBC COUNT: CPT | Performed by: INTERNAL MEDICINE

## 2020-01-01 PROCEDURE — 84484 ASSAY OF TROPONIN QUANT: CPT | Performed by: EMERGENCY MEDICINE

## 2020-01-01 PROCEDURE — 99285 EMERGENCY DEPT VISIT HI MDM: CPT | Performed by: PHYSICIAN ASSISTANT

## 2020-01-01 PROCEDURE — 99233 SBSQ HOSP IP/OBS HIGH 50: CPT | Performed by: STUDENT IN AN ORGANIZED HEALTH CARE EDUCATION/TRAINING PROGRAM

## 2020-01-01 PROCEDURE — 86140 C-REACTIVE PROTEIN: CPT | Performed by: HOSPITALIST

## 2020-01-01 PROCEDURE — 80053 COMPREHEN METABOLIC PANEL: CPT | Performed by: INTERNAL MEDICINE

## 2020-01-01 PROCEDURE — 85379 FIBRIN DEGRADATION QUANT: CPT | Performed by: PHYSICIAN ASSISTANT

## 2020-01-01 PROCEDURE — 85379 FIBRIN DEGRADATION QUANT: CPT | Performed by: EMERGENCY MEDICINE

## 2020-01-01 PROCEDURE — 36415 COLL VENOUS BLD VENIPUNCTURE: CPT | Performed by: EMERGENCY MEDICINE

## 2020-01-01 PROCEDURE — 82550 ASSAY OF CK (CPK): CPT | Performed by: PHYSICIAN ASSISTANT

## 2020-01-01 PROCEDURE — 99232 SBSQ HOSP IP/OBS MODERATE 35: CPT | Performed by: HOSPITALIST

## 2020-01-01 PROCEDURE — 94002 VENT MGMT INPAT INIT DAY: CPT

## 2020-01-01 PROCEDURE — 84300 ASSAY OF URINE SODIUM: CPT | Performed by: ANESTHESIOLOGY

## 2020-01-01 PROCEDURE — 80048 BASIC METABOLIC PNL TOTAL CA: CPT | Performed by: INTERNAL MEDICINE

## 2020-01-01 PROCEDURE — 93005 ELECTROCARDIOGRAM TRACING: CPT

## 2020-01-01 PROCEDURE — 86140 C-REACTIVE PROTEIN: CPT | Performed by: NURSE PRACTITIONER

## 2020-01-01 PROCEDURE — 87081 CULTURE SCREEN ONLY: CPT | Performed by: INTERNAL MEDICINE

## 2020-01-01 PROCEDURE — 86140 C-REACTIVE PROTEIN: CPT | Performed by: INTERNAL MEDICINE

## 2020-01-01 PROCEDURE — 85025 COMPLETE CBC W/AUTO DIFF WBC: CPT | Performed by: INTERNAL MEDICINE

## 2020-01-01 PROCEDURE — 82550 ASSAY OF CK (CPK): CPT | Performed by: INTERNAL MEDICINE

## 2020-01-01 PROCEDURE — 97535 SELF CARE MNGMENT TRAINING: CPT

## 2020-01-01 PROCEDURE — 85730 THROMBOPLASTIN TIME PARTIAL: CPT | Performed by: PHYSICIAN ASSISTANT

## 2020-01-01 PROCEDURE — 83880 ASSAY OF NATRIURETIC PEPTIDE: CPT | Performed by: ANESTHESIOLOGY

## 2020-01-01 PROCEDURE — 99291 CRITICAL CARE FIRST HOUR: CPT | Performed by: EMERGENCY MEDICINE

## 2020-01-01 PROCEDURE — 83605 ASSAY OF LACTIC ACID: CPT | Performed by: PHYSICIAN ASSISTANT

## 2020-01-01 PROCEDURE — 85025 COMPLETE CBC W/AUTO DIFF WBC: CPT | Performed by: HOSPITALIST

## 2020-01-01 PROCEDURE — 84145 PROCALCITONIN (PCT): CPT | Performed by: INTERNAL MEDICINE

## 2020-01-01 PROCEDURE — 80048 BASIC METABOLIC PNL TOTAL CA: CPT | Performed by: HOSPITALIST

## 2020-01-01 PROCEDURE — 80053 COMPREHEN METABOLIC PANEL: CPT | Performed by: PHYSICIAN ASSISTANT

## 2020-01-01 PROCEDURE — 86140 C-REACTIVE PROTEIN: CPT | Performed by: ANESTHESIOLOGY

## 2020-01-01 PROCEDURE — 93010 ELECTROCARDIOGRAM REPORT: CPT | Performed by: INTERNAL MEDICINE

## 2020-01-01 PROCEDURE — 85379 FIBRIN DEGRADATION QUANT: CPT | Performed by: NURSE PRACTITIONER

## 2020-01-01 PROCEDURE — 80048 BASIC METABOLIC PNL TOTAL CA: CPT | Performed by: PHYSICIAN ASSISTANT

## 2020-01-01 PROCEDURE — 82728 ASSAY OF FERRITIN: CPT | Performed by: HOSPITALIST

## 2020-01-01 PROCEDURE — 85379 FIBRIN DEGRADATION QUANT: CPT | Performed by: ANESTHESIOLOGY

## 2020-01-01 PROCEDURE — 85027 COMPLETE CBC AUTOMATED: CPT | Performed by: INTERNAL MEDICINE

## 2020-01-01 PROCEDURE — 80053 COMPREHEN METABOLIC PANEL: CPT

## 2020-01-01 PROCEDURE — 82728 ASSAY OF FERRITIN: CPT | Performed by: INTERNAL MEDICINE

## 2020-01-01 PROCEDURE — 0241U HB NFCT DS VIR RESP RNA 4 TRGT: CPT | Performed by: PHYSICIAN ASSISTANT

## 2020-01-01 PROCEDURE — 85025 COMPLETE CBC W/AUTO DIFF WBC: CPT | Performed by: PHYSICIAN ASSISTANT

## 2020-01-01 PROCEDURE — 82728 ASSAY OF FERRITIN: CPT | Performed by: NURSE PRACTITIONER

## 2020-01-01 PROCEDURE — 83520 IMMUNOASSAY QUANT NOS NONAB: CPT | Performed by: NURSE PRACTITIONER

## 2020-01-01 PROCEDURE — 82728 ASSAY OF FERRITIN: CPT | Performed by: PHYSICIAN ASSISTANT

## 2020-01-01 PROCEDURE — G0427 INPT/ED TELECONSULT70: HCPCS | Performed by: INTERNAL MEDICINE

## 2020-01-01 PROCEDURE — 85025 COMPLETE CBC W/AUTO DIFF WBC: CPT | Performed by: ANESTHESIOLOGY

## 2020-01-01 PROCEDURE — 71250 CT THORAX DX C-: CPT

## 2020-01-01 PROCEDURE — 05HY33Z INSERTION OF INFUSION DEVICE INTO UPPER VEIN, PERCUTANEOUS APPROACH: ICD-10-PCS | Performed by: INTERNAL MEDICINE

## 2020-01-01 PROCEDURE — G2012 BRIEF CHECK IN BY MD/QHP: HCPCS | Performed by: PHYSICIAN ASSISTANT

## 2020-01-01 PROCEDURE — 99222 1ST HOSP IP/OBS MODERATE 55: CPT | Performed by: STUDENT IN AN ORGANIZED HEALTH CARE EDUCATION/TRAINING PROGRAM

## 2020-01-01 PROCEDURE — 80053 COMPREHEN METABOLIC PANEL: CPT | Performed by: EMERGENCY MEDICINE

## 2020-01-01 PROCEDURE — 99239 HOSP IP/OBS DSCHRG MGMT >30: CPT | Performed by: HOSPITALIST

## 2020-01-01 PROCEDURE — 86900 BLOOD TYPING SEROLOGIC ABO: CPT | Performed by: HOSPITALIST

## 2020-01-01 PROCEDURE — 86850 RBC ANTIBODY SCREEN: CPT | Performed by: HOSPITALIST

## 2020-01-01 PROCEDURE — 99232 SBSQ HOSP IP/OBS MODERATE 35: CPT | Performed by: NURSE PRACTITIONER

## 2020-01-01 PROCEDURE — 84145 PROCALCITONIN (PCT): CPT | Performed by: PHYSICIAN ASSISTANT

## 2020-01-01 PROCEDURE — 82570 ASSAY OF URINE CREATININE: CPT | Performed by: ANESTHESIOLOGY

## 2020-01-01 PROCEDURE — 36556 INSERT NON-TUNNEL CV CATH: CPT | Performed by: SURGERY

## 2020-01-01 PROCEDURE — 36000 PLACE NEEDLE IN VEIN: CPT | Performed by: PHYSICIAN ASSISTANT

## 2020-01-01 PROCEDURE — 85610 PROTHROMBIN TIME: CPT | Performed by: PHYSICIAN ASSISTANT

## 2020-01-01 PROCEDURE — 83880 ASSAY OF NATRIURETIC PEPTIDE: CPT | Performed by: EMERGENCY MEDICINE

## 2020-01-01 PROCEDURE — 82652 VIT D 1 25-DIHYDROXY: CPT | Performed by: PHYSICIAN ASSISTANT

## 2020-01-01 PROCEDURE — 99285 EMERGENCY DEPT VISIT HI MDM: CPT

## 2020-01-01 PROCEDURE — 84540 ASSAY OF URINE/UREA-N: CPT | Performed by: ANESTHESIOLOGY

## 2020-01-01 PROCEDURE — 85025 COMPLETE CBC W/AUTO DIFF WBC: CPT | Performed by: EMERGENCY MEDICINE

## 2020-01-01 PROCEDURE — 97163 PT EVAL HIGH COMPLEX 45 MIN: CPT

## 2020-01-01 PROCEDURE — 85379 FIBRIN DEGRADATION QUANT: CPT | Performed by: INTERNAL MEDICINE

## 2020-01-01 PROCEDURE — 83735 ASSAY OF MAGNESIUM: CPT | Performed by: NURSE PRACTITIONER

## 2020-01-01 PROCEDURE — G0463 HOSPITAL OUTPT CLINIC VISIT: HCPCS | Performed by: PHYSICIAN ASSISTANT

## 2020-01-01 PROCEDURE — 80053 COMPREHEN METABOLIC PANEL: CPT | Performed by: ANESTHESIOLOGY

## 2020-01-01 PROCEDURE — 86140 C-REACTIVE PROTEIN: CPT | Performed by: PHYSICIAN ASSISTANT

## 2020-01-01 PROCEDURE — 87040 BLOOD CULTURE FOR BACTERIA: CPT | Performed by: PHYSICIAN ASSISTANT

## 2020-01-01 PROCEDURE — 86300 IMMUNOASSAY TUMOR CA 15-3: CPT

## 2020-01-01 PROCEDURE — XW13325 TRANSFUSION OF CONVALESCENT PLASMA (NONAUTOLOGOUS) INTO PERIPHERAL VEIN, PERCUTANEOUS APPROACH, NEW TECHNOLOGY GROUP 5: ICD-10-PCS | Performed by: HOSPITALIST

## 2020-01-01 PROCEDURE — 81003 URINALYSIS AUTO W/O SCOPE: CPT | Performed by: ANESTHESIOLOGY

## 2020-01-01 PROCEDURE — 94668 MNPJ CHEST WALL SBSQ: CPT

## 2020-01-01 PROCEDURE — 99213 OFFICE O/P EST LOW 20 MIN: CPT | Performed by: PHYSICIAN ASSISTANT

## 2020-01-01 PROCEDURE — 85049 AUTOMATED PLATELET COUNT: CPT | Performed by: NURSE PRACTITIONER

## 2020-01-01 PROCEDURE — 36415 COLL VENOUS BLD VENIPUNCTURE: CPT

## 2020-01-01 PROCEDURE — 99223 1ST HOSP IP/OBS HIGH 75: CPT | Performed by: PHYSICIAN ASSISTANT

## 2020-01-01 PROCEDURE — 84478 ASSAY OF TRIGLYCERIDES: CPT | Performed by: ANESTHESIOLOGY

## 2020-01-01 PROCEDURE — XW033E5 INTRODUCTION OF REMDESIVIR ANTI-INFECTIVE INTO PERIPHERAL VEIN, PERCUTANEOUS APPROACH, NEW TECHNOLOGY GROUP 5: ICD-10-PCS | Performed by: HOSPITALIST

## 2020-01-01 PROCEDURE — 84484 ASSAY OF TROPONIN QUANT: CPT | Performed by: NURSE PRACTITIONER

## 2020-01-01 PROCEDURE — 86901 BLOOD TYPING SEROLOGIC RH(D): CPT | Performed by: HOSPITALIST

## 2020-01-01 PROCEDURE — 83880 ASSAY OF NATRIURETIC PEPTIDE: CPT

## 2020-01-01 PROCEDURE — 94640 AIRWAY INHALATION TREATMENT: CPT

## 2020-01-01 PROCEDURE — 96360 HYDRATION IV INFUSION INIT: CPT

## 2020-01-01 RX ORDER — FUROSEMIDE 20 MG/1
20 TABLET ORAL 2 TIMES DAILY
Refills: 0
Start: 2020-01-01

## 2020-01-01 RX ORDER — ALBUTEROL SULFATE 2.5 MG/3ML
2.5 SOLUTION RESPIRATORY (INHALATION) EVERY 4 HOURS PRN
Status: CANCELLED | OUTPATIENT
Start: 2020-01-01

## 2020-01-01 RX ORDER — FUROSEMIDE 20 MG/1
20 TABLET ORAL 3 TIMES DAILY
Qty: 90 TABLET | Refills: 3 | Status: SHIPPED | OUTPATIENT
Start: 2020-01-01 | End: 2020-01-01 | Stop reason: HOSPADM

## 2020-01-01 RX ORDER — ASCORBIC ACID 500 MG
1000 TABLET ORAL EVERY 12 HOURS SCHEDULED
Status: COMPLETED | OUTPATIENT
Start: 2020-01-01 | End: 2020-01-01

## 2020-01-01 RX ORDER — POTASSIUM CHLORIDE 750 MG/1
10 TABLET, EXTENDED RELEASE ORAL 2 TIMES DAILY
Qty: 45 TABLET | Refills: 3 | Status: SHIPPED | OUTPATIENT
Start: 2020-01-01 | End: 2020-01-01

## 2020-01-01 RX ORDER — CALCIUM CARBONATE 500(1250)
1 TABLET ORAL
Status: DISCONTINUED | OUTPATIENT
Start: 2020-01-01 | End: 2020-01-01 | Stop reason: HOSPADM

## 2020-01-01 RX ORDER — ALBUMIN, HUMAN INJ 5% 5 %
12.5 SOLUTION INTRAVENOUS EVERY 6 HOURS
Status: COMPLETED | OUTPATIENT
Start: 2020-01-01 | End: 2020-01-01

## 2020-01-01 RX ORDER — FUROSEMIDE 20 MG/1
20 TABLET ORAL 3 TIMES DAILY
Status: DISCONTINUED | OUTPATIENT
Start: 2020-01-01 | End: 2020-01-01

## 2020-01-01 RX ORDER — FUROSEMIDE 20 MG/1
20 TABLET ORAL 2 TIMES DAILY
Qty: 45 TABLET | Refills: 3 | Status: SHIPPED | OUTPATIENT
Start: 2020-01-01 | End: 2020-01-01

## 2020-01-01 RX ORDER — METOPROLOL SUCCINATE 25 MG/1
25 TABLET, EXTENDED RELEASE ORAL DAILY
Status: CANCELLED | OUTPATIENT
Start: 2021-01-01

## 2020-01-01 RX ORDER — METOPROLOL SUCCINATE 25 MG/1
25 TABLET, EXTENDED RELEASE ORAL ONCE
Status: COMPLETED | OUTPATIENT
Start: 2020-01-01 | End: 2020-01-01

## 2020-01-01 RX ORDER — FUROSEMIDE 10 MG/ML
40 INJECTION INTRAMUSCULAR; INTRAVENOUS DAILY
Status: DISCONTINUED | OUTPATIENT
Start: 2020-01-01 | End: 2020-01-01

## 2020-01-01 RX ORDER — ALBUMIN, HUMAN INJ 5% 5 %
12.5 SOLUTION INTRAVENOUS ONCE
Status: COMPLETED | OUTPATIENT
Start: 2020-01-01 | End: 2020-01-01

## 2020-01-01 RX ORDER — ALBUMIN (HUMAN) 12.5 G/50ML
12.5 SOLUTION INTRAVENOUS ONCE
Status: COMPLETED | OUTPATIENT
Start: 2020-01-01 | End: 2020-01-01

## 2020-01-01 RX ORDER — ATORVASTATIN CALCIUM 40 MG/1
40 TABLET, FILM COATED ORAL
Status: CANCELLED | OUTPATIENT
Start: 2021-01-01

## 2020-01-01 RX ORDER — FUROSEMIDE 10 MG/ML
20 INJECTION INTRAMUSCULAR; INTRAVENOUS ONCE
Status: COMPLETED | OUTPATIENT
Start: 2020-01-01 | End: 2020-01-01

## 2020-01-01 RX ORDER — FUROSEMIDE 20 MG/1
20 TABLET ORAL 2 TIMES DAILY
COMMUNITY
End: 2020-01-01

## 2020-01-01 RX ORDER — LISINOPRIL 5 MG/1
5 TABLET ORAL DAILY
Status: DISCONTINUED | OUTPATIENT
Start: 2020-01-01 | End: 2020-01-01

## 2020-01-01 RX ORDER — FUROSEMIDE 10 MG/ML
40 INJECTION INTRAMUSCULAR; INTRAVENOUS
Status: DISCONTINUED | OUTPATIENT
Start: 2020-01-01 | End: 2020-01-01

## 2020-01-01 RX ORDER — FUROSEMIDE 10 MG/ML
40 INJECTION INTRAMUSCULAR; INTRAVENOUS ONCE
Status: COMPLETED | OUTPATIENT
Start: 2020-01-01 | End: 2020-01-01

## 2020-01-01 RX ORDER — MELATONIN
2000 DAILY
Status: DISCONTINUED | OUTPATIENT
Start: 2020-01-01 | End: 2020-01-01 | Stop reason: HOSPADM

## 2020-01-01 RX ORDER — ALBUMIN (HUMAN) 12.5 G/50ML
12.5 SOLUTION INTRAVENOUS EVERY 8 HOURS
Status: COMPLETED | OUTPATIENT
Start: 2020-01-01 | End: 2020-01-01

## 2020-01-01 RX ORDER — ANASTROZOLE 1 MG/1
1 TABLET ORAL DAILY
Status: DISCONTINUED | OUTPATIENT
Start: 2020-01-01 | End: 2020-01-01 | Stop reason: HOSPADM

## 2020-01-01 RX ORDER — ATORVASTATIN CALCIUM 40 MG/1
40 TABLET, FILM COATED ORAL
Status: DISCONTINUED | OUTPATIENT
Start: 2020-01-01 | End: 2020-01-01 | Stop reason: HOSPADM

## 2020-01-01 RX ORDER — SODIUM CHLORIDE 9 MG/ML
100 INJECTION, SOLUTION INTRAVENOUS CONTINUOUS
Status: DISCONTINUED | OUTPATIENT
Start: 2020-01-01 | End: 2020-01-01

## 2020-01-01 RX ORDER — ACETAMINOPHEN 325 MG/1
650 TABLET ORAL EVERY 6 HOURS PRN
Status: DISCONTINUED | OUTPATIENT
Start: 2020-01-01 | End: 2020-01-01 | Stop reason: HOSPADM

## 2020-01-01 RX ORDER — ANASTROZOLE 1 MG/1
1 TABLET ORAL DAILY
Status: CANCELLED | OUTPATIENT
Start: 2021-01-01

## 2020-01-01 RX ORDER — ATORVASTATIN CALCIUM 40 MG/1
40 TABLET, FILM COATED ORAL
Qty: 14 TABLET | Refills: 0
Start: 2020-01-01

## 2020-01-01 RX ORDER — LISINOPRIL 5 MG/1
5 TABLET ORAL DAILY
Qty: 90 TABLET | Refills: 3 | Status: SHIPPED | OUTPATIENT
Start: 2020-01-01

## 2020-01-01 RX ORDER — FAMOTIDINE 20 MG/1
20 TABLET, FILM COATED ORAL 2 TIMES DAILY
Status: DISCONTINUED | OUTPATIENT
Start: 2020-01-01 | End: 2020-01-01 | Stop reason: HOSPADM

## 2020-01-01 RX ORDER — SODIUM CHLORIDE, SODIUM GLUCONATE, SODIUM ACETATE, POTASSIUM CHLORIDE, MAGNESIUM CHLORIDE, SODIUM PHOSPHATE, DIBASIC, AND POTASSIUM PHOSPHATE .53; .5; .37; .037; .03; .012; .00082 G/100ML; G/100ML; G/100ML; G/100ML; G/100ML; G/100ML; G/100ML
75 INJECTION, SOLUTION INTRAVENOUS ONCE
Status: COMPLETED | OUTPATIENT
Start: 2020-01-01 | End: 2020-01-01

## 2020-01-01 RX ORDER — SODIUM POLYSTYRENE SULFONATE 4.1 MEQ/G
15 POWDER, FOR SUSPENSION ORAL; RECTAL ONCE
Status: COMPLETED | OUTPATIENT
Start: 2020-01-01 | End: 2020-01-01

## 2020-01-01 RX ORDER — SODIUM CHLORIDE 9 MG/ML
100 INJECTION, SOLUTION INTRAVENOUS CONTINUOUS
Status: CANCELLED | OUTPATIENT
Start: 2020-01-01

## 2020-01-01 RX ORDER — ZINC SULFATE 50(220)MG
220 CAPSULE ORAL DAILY
Status: COMPLETED | OUTPATIENT
Start: 2020-01-01 | End: 2020-01-01

## 2020-01-01 RX ORDER — FLUCONAZOLE 150 MG/1
150 TABLET ORAL ONCE
Status: COMPLETED | OUTPATIENT
Start: 2020-01-01 | End: 2020-01-01

## 2020-01-01 RX ORDER — SODIUM CHLORIDE 9 MG/ML
100 INJECTION, SOLUTION INTRAVENOUS CONTINUOUS
Status: DISCONTINUED | OUTPATIENT
Start: 2020-01-01 | End: 2021-01-01 | Stop reason: HOSPADM

## 2020-01-01 RX ORDER — METOPROLOL SUCCINATE 50 MG/1
50 TABLET, EXTENDED RELEASE ORAL DAILY
Status: DISCONTINUED | OUTPATIENT
Start: 2020-01-01 | End: 2020-01-01 | Stop reason: HOSPADM

## 2020-01-01 RX ORDER — CEFEPIME HYDROCHLORIDE 1 G/50ML
1000 INJECTION, SOLUTION INTRAVENOUS EVERY 12 HOURS
Status: CANCELLED | OUTPATIENT
Start: 2021-01-01

## 2020-01-01 RX ORDER — SACCHAROMYCES BOULARDII 250 MG
250 CAPSULE ORAL 2 TIMES DAILY
Status: CANCELLED | OUTPATIENT
Start: 2021-01-01

## 2020-01-01 RX ORDER — CEFEPIME HYDROCHLORIDE 1 G/50ML
1000 INJECTION, SOLUTION INTRAVENOUS EVERY 12 HOURS
Status: DISCONTINUED | OUTPATIENT
Start: 2021-01-01 | End: 2021-01-01 | Stop reason: HOSPADM

## 2020-01-01 RX ORDER — METOLAZONE 2.5 MG/1
2.5 TABLET ORAL DAILY
Qty: 30 TABLET | Refills: 3 | Status: SHIPPED | OUTPATIENT
Start: 2020-01-01

## 2020-01-01 RX ORDER — LANOLIN ALCOHOL/MO/W.PET/CERES
6 CREAM (GRAM) TOPICAL
Status: DISCONTINUED | OUTPATIENT
Start: 2020-01-01 | End: 2020-01-01 | Stop reason: HOSPADM

## 2020-01-01 RX ORDER — DEXAMETHASONE SODIUM PHOSPHATE 4 MG/ML
6 INJECTION, SOLUTION INTRA-ARTICULAR; INTRALESIONAL; INTRAMUSCULAR; INTRAVENOUS; SOFT TISSUE DAILY
Status: DISCONTINUED | OUTPATIENT
Start: 2020-01-01 | End: 2020-01-01

## 2020-01-01 RX ORDER — FUROSEMIDE 20 MG/1
20 TABLET ORAL
Status: DISCONTINUED | OUTPATIENT
Start: 2020-01-01 | End: 2020-01-01 | Stop reason: HOSPADM

## 2020-01-01 RX ORDER — DEXTROSE MONOHYDRATE 25 G/50ML
25 INJECTION, SOLUTION INTRAVENOUS ONCE
Status: DISCONTINUED | OUTPATIENT
Start: 2020-01-01 | End: 2020-01-01

## 2020-01-01 RX ORDER — METOPROLOL SUCCINATE 25 MG/1
25 TABLET, EXTENDED RELEASE ORAL DAILY
Status: DISCONTINUED | OUTPATIENT
Start: 2020-01-01 | End: 2020-01-01

## 2020-01-01 RX ORDER — METOPROLOL SUCCINATE 50 MG/1
TABLET, EXTENDED RELEASE ORAL
Qty: 90 TABLET | Refills: 3 | Status: SHIPPED | OUTPATIENT
Start: 2020-01-01

## 2020-01-01 RX ORDER — POTASSIUM CHLORIDE 750 MG/1
10 TABLET, EXTENDED RELEASE ORAL 3 TIMES DAILY
Qty: 90 TABLET | Refills: 3 | Status: SHIPPED | OUTPATIENT
Start: 2020-01-01

## 2020-01-01 RX ORDER — DEXAMETHASONE SODIUM PHOSPHATE 4 MG/ML
6 INJECTION, SOLUTION INTRA-ARTICULAR; INTRALESIONAL; INTRAMUSCULAR; INTRAVENOUS; SOFT TISSUE DAILY
Status: COMPLETED | OUTPATIENT
Start: 2020-01-01 | End: 2020-01-01

## 2020-01-01 RX ORDER — METOPROLOL SUCCINATE 25 MG/1
25 TABLET, EXTENDED RELEASE ORAL DAILY
Status: DISCONTINUED | OUTPATIENT
Start: 2021-01-01 | End: 2021-01-01 | Stop reason: HOSPADM

## 2020-01-01 RX ORDER — DOXYCYCLINE HYCLATE 100 MG/1
100 CAPSULE ORAL EVERY 12 HOURS SCHEDULED
Status: DISCONTINUED | OUTPATIENT
Start: 2020-01-01 | End: 2020-01-01

## 2020-01-01 RX ORDER — FUROSEMIDE 20 MG/1
20 TABLET ORAL
Status: DISCONTINUED | OUTPATIENT
Start: 2020-01-01 | End: 2020-01-01

## 2020-01-01 RX ORDER — POTASSIUM CHLORIDE 20 MEQ/1
20 TABLET, EXTENDED RELEASE ORAL 2 TIMES DAILY
Status: DISCONTINUED | OUTPATIENT
Start: 2020-01-01 | End: 2020-01-01

## 2020-01-01 RX ORDER — ALBUTEROL SULFATE 2.5 MG/3ML
5 SOLUTION RESPIRATORY (INHALATION) ONCE
Status: COMPLETED | OUTPATIENT
Start: 2020-01-01 | End: 2020-01-01

## 2020-01-01 RX ORDER — ONDANSETRON 2 MG/ML
4 INJECTION INTRAMUSCULAR; INTRAVENOUS EVERY 6 HOURS PRN
Status: DISCONTINUED | OUTPATIENT
Start: 2020-01-01 | End: 2020-01-01 | Stop reason: HOSPADM

## 2020-01-01 RX ORDER — SACCHAROMYCES BOULARDII 250 MG
250 CAPSULE ORAL 2 TIMES DAILY
Status: DISCONTINUED | OUTPATIENT
Start: 2020-01-01 | End: 2021-01-01 | Stop reason: HOSPADM

## 2020-01-01 RX ORDER — ATORVASTATIN CALCIUM 40 MG/1
40 TABLET, FILM COATED ORAL
Status: DISCONTINUED | OUTPATIENT
Start: 2020-01-01 | End: 2021-01-01 | Stop reason: HOSPADM

## 2020-01-01 RX ORDER — ALBUTEROL SULFATE 2.5 MG/3ML
2.5 SOLUTION RESPIRATORY (INHALATION) EVERY 4 HOURS PRN
Status: DISCONTINUED | OUTPATIENT
Start: 2020-01-01 | End: 2021-01-01 | Stop reason: HOSPADM

## 2020-01-01 RX ORDER — CEFTRIAXONE 1 G/50ML
1000 INJECTION, SOLUTION INTRAVENOUS EVERY 24 HOURS
Status: DISCONTINUED | OUTPATIENT
Start: 2020-01-01 | End: 2020-01-01

## 2020-01-01 RX ORDER — NYSTATIN 100000 [USP'U]/G
POWDER TOPICAL 2 TIMES DAILY
Status: DISCONTINUED | OUTPATIENT
Start: 2020-01-01 | End: 2020-01-01 | Stop reason: HOSPADM

## 2020-01-01 RX ORDER — ANASTROZOLE 1 MG/1
1 TABLET ORAL DAILY
Status: DISCONTINUED | OUTPATIENT
Start: 2021-01-01 | End: 2021-01-01 | Stop reason: HOSPADM

## 2020-01-01 RX ORDER — VANCOMYCIN HYDROCHLORIDE 1 G/200ML
12.5 INJECTION, SOLUTION INTRAVENOUS EVERY 24 HOURS
Status: CANCELLED | OUTPATIENT
Start: 2021-01-01

## 2020-01-01 RX ORDER — DOCUSATE SODIUM 100 MG/1
100 CAPSULE, LIQUID FILLED ORAL 2 TIMES DAILY
Status: DISCONTINUED | OUTPATIENT
Start: 2020-01-01 | End: 2020-01-01 | Stop reason: HOSPADM

## 2020-01-01 RX ORDER — POTASSIUM CHLORIDE 20 MEQ/1
20 TABLET, EXTENDED RELEASE ORAL ONCE
Status: COMPLETED | OUTPATIENT
Start: 2020-01-01 | End: 2020-01-01

## 2020-01-01 RX ORDER — LISINOPRIL 5 MG/1
5 TABLET ORAL DAILY
Qty: 14 TABLET | Refills: 0 | Status: SHIPPED | OUTPATIENT
Start: 2020-01-01 | End: 2020-01-01 | Stop reason: SDUPTHER

## 2020-01-01 RX ORDER — CEFEPIME HYDROCHLORIDE 2 G/50ML
2000 INJECTION, SOLUTION INTRAVENOUS ONCE
Status: COMPLETED | OUTPATIENT
Start: 2020-01-01 | End: 2020-01-01

## 2020-01-01 RX ORDER — ALBUTEROL SULFATE 90 UG/1
2 AEROSOL, METERED RESPIRATORY (INHALATION) EVERY 4 HOURS PRN
Status: DISCONTINUED | OUTPATIENT
Start: 2020-01-01 | End: 2020-01-01 | Stop reason: HOSPADM

## 2020-01-01 RX ADMIN — FAMOTIDINE 20 MG: 20 TABLET ORAL at 08:27

## 2020-01-01 RX ADMIN — FAMOTIDINE 20 MG: 20 TABLET ORAL at 17:10

## 2020-01-01 RX ADMIN — DOXYCYCLINE 100 MG: 100 CAPSULE ORAL at 11:46

## 2020-01-01 RX ADMIN — APIXABAN 5 MG: 5 TABLET, FILM COATED ORAL at 08:08

## 2020-01-01 RX ADMIN — ZINC SULFATE 220 MG (50 MG) CAPSULE 220 MG: CAPSULE at 08:49

## 2020-01-01 RX ADMIN — FLUCONAZOLE 150 MG: 150 TABLET ORAL at 09:17

## 2020-01-01 RX ADMIN — ATORVASTATIN CALCIUM 40 MG: 40 TABLET, FILM COATED ORAL at 22:12

## 2020-01-01 RX ADMIN — Medication 1 TABLET: at 08:41

## 2020-01-01 RX ADMIN — ALBUMIN (HUMAN) 12.5 G: 0.25 INJECTION, SOLUTION INTRAVENOUS at 05:40

## 2020-01-01 RX ADMIN — APIXABAN 5 MG: 5 TABLET, FILM COATED ORAL at 08:53

## 2020-01-01 RX ADMIN — FAMOTIDINE 20 MG: 20 TABLET ORAL at 08:04

## 2020-01-01 RX ADMIN — METOPROLOL SUCCINATE 25 MG: 25 TABLET, EXTENDED RELEASE ORAL at 08:48

## 2020-01-01 RX ADMIN — DOXYCYCLINE 100 MG: 100 CAPSULE ORAL at 23:43

## 2020-01-01 RX ADMIN — REMDESIVIR 100 MG: 100 INJECTION, POWDER, LYOPHILIZED, FOR SOLUTION INTRAVENOUS at 18:05

## 2020-01-01 RX ADMIN — DEXAMETHASONE SODIUM PHOSPHATE 6 MG: 4 INJECTION, SOLUTION INTRA-ARTICULAR; INTRALESIONAL; INTRAMUSCULAR; INTRAVENOUS; SOFT TISSUE at 08:32

## 2020-01-01 RX ADMIN — FAMOTIDINE 20 MG: 20 TABLET ORAL at 08:31

## 2020-01-01 RX ADMIN — OXYCODONE HYDROCHLORIDE AND ACETAMINOPHEN 1000 MG: 500 TABLET ORAL at 08:02

## 2020-01-01 RX ADMIN — DEXAMETHASONE SODIUM PHOSPHATE 6 MG: 4 INJECTION, SOLUTION INTRA-ARTICULAR; INTRALESIONAL; INTRAMUSCULAR; INTRAVENOUS; SOFT TISSUE at 08:00

## 2020-01-01 RX ADMIN — FAMOTIDINE 20 MG: 20 TABLET ORAL at 17:30

## 2020-01-01 RX ADMIN — APIXABAN 5 MG: 5 TABLET, FILM COATED ORAL at 17:08

## 2020-01-01 RX ADMIN — LISINOPRIL 5 MG: 5 TABLET ORAL at 08:26

## 2020-01-01 RX ADMIN — Medication 1 TABLET: at 08:03

## 2020-01-01 RX ADMIN — FAMOTIDINE 20 MG: 20 TABLET ORAL at 17:12

## 2020-01-01 RX ADMIN — LISINOPRIL 5 MG: 5 TABLET ORAL at 08:32

## 2020-01-01 RX ADMIN — ATORVASTATIN CALCIUM 40 MG: 40 TABLET, FILM COATED ORAL at 21:00

## 2020-01-01 RX ADMIN — FUROSEMIDE 20 MG: 20 TABLET ORAL at 16:46

## 2020-01-01 RX ADMIN — FUROSEMIDE 20 MG: 20 TABLET ORAL at 06:27

## 2020-01-01 RX ADMIN — ATORVASTATIN CALCIUM 40 MG: 40 TABLET, FILM COATED ORAL at 22:01

## 2020-01-01 RX ADMIN — CALCIUM 1 TABLET: 500 TABLET ORAL at 08:54

## 2020-01-01 RX ADMIN — ALBUTEROL SULFATE 2 PUFF: 90 AEROSOL, METERED RESPIRATORY (INHALATION) at 12:07

## 2020-01-01 RX ADMIN — DOCUSATE SODIUM 100 MG: 100 CAPSULE, LIQUID FILLED ORAL at 17:05

## 2020-01-01 RX ADMIN — CALCIUM 1 TABLET: 500 TABLET ORAL at 08:04

## 2020-01-01 RX ADMIN — CALCIUM 1 TABLET: 500 TABLET ORAL at 08:30

## 2020-01-01 RX ADMIN — FAMOTIDINE 20 MG: 20 TABLET ORAL at 17:18

## 2020-01-01 RX ADMIN — FUROSEMIDE 20 MG: 20 TABLET ORAL at 05:17

## 2020-01-01 RX ADMIN — ANASTROZOLE 1 MG: 1 TABLET ORAL at 09:16

## 2020-01-01 RX ADMIN — ATORVASTATIN CALCIUM 40 MG: 40 TABLET, FILM COATED ORAL at 21:29

## 2020-01-01 RX ADMIN — Medication 1 TABLET: at 09:07

## 2020-01-01 RX ADMIN — FAMOTIDINE 20 MG: 20 TABLET ORAL at 17:05

## 2020-01-01 RX ADMIN — VITAMIN D 2000 UNITS: 25 TAB ORAL at 08:08

## 2020-01-01 RX ADMIN — POTASSIUM CHLORIDE 20 MEQ: 1500 TABLET, EXTENDED RELEASE ORAL at 17:11

## 2020-01-01 RX ADMIN — FAMOTIDINE 20 MG: 20 TABLET ORAL at 08:42

## 2020-01-01 RX ADMIN — NYSTATIN 1 APPLICATION: 100000 POWDER TOPICAL at 08:06

## 2020-01-01 RX ADMIN — Medication 125 MG: at 19:30

## 2020-01-01 RX ADMIN — VITAMIN D 2000 UNITS: 25 TAB ORAL at 09:53

## 2020-01-01 RX ADMIN — FAMOTIDINE 20 MG: 20 TABLET ORAL at 17:00

## 2020-01-01 RX ADMIN — FAMOTIDINE 20 MG: 20 TABLET ORAL at 08:03

## 2020-01-01 RX ADMIN — Medication 1 TABLET: at 08:17

## 2020-01-01 RX ADMIN — APIXABAN 5 MG: 5 TABLET, FILM COATED ORAL at 09:16

## 2020-01-01 RX ADMIN — FUROSEMIDE 20 MG: 20 TABLET ORAL at 16:19

## 2020-01-01 RX ADMIN — FUROSEMIDE 20 MG: 20 TABLET ORAL at 05:53

## 2020-01-01 RX ADMIN — CEFTRIAXONE 1000 MG: 1 INJECTION, SOLUTION INTRAVENOUS at 05:01

## 2020-01-01 RX ADMIN — FUROSEMIDE 40 MG: 10 INJECTION, SOLUTION INTRAMUSCULAR; INTRAVENOUS at 08:00

## 2020-01-01 RX ADMIN — POTASSIUM CHLORIDE 20 MEQ: 1500 TABLET, EXTENDED RELEASE ORAL at 17:00

## 2020-01-01 RX ADMIN — POTASSIUM CHLORIDE 20 MEQ: 1500 TABLET, EXTENDED RELEASE ORAL at 17:18

## 2020-01-01 RX ADMIN — ALBUMIN (HUMAN) 12.5 G: 0.25 INJECTION, SOLUTION INTRAVENOUS at 11:00

## 2020-01-01 RX ADMIN — FAMOTIDINE 20 MG: 20 TABLET ORAL at 09:07

## 2020-01-01 RX ADMIN — ALBUMIN (HUMAN) 12.5 G: 12.5 INJECTION, SOLUTION INTRAVENOUS at 13:59

## 2020-01-01 RX ADMIN — OXYCODONE HYDROCHLORIDE AND ACETAMINOPHEN 1000 MG: 500 TABLET ORAL at 09:54

## 2020-01-01 RX ADMIN — VITAMIN D 2000 UNITS: 25 TAB ORAL at 08:32

## 2020-01-01 RX ADMIN — NYSTATIN 1 APPLICATION: 100000 POWDER TOPICAL at 08:54

## 2020-01-01 RX ADMIN — METOPROLOL SUCCINATE 25 MG: 25 TABLET, EXTENDED RELEASE ORAL at 09:53

## 2020-01-01 RX ADMIN — ANASTROZOLE 1 MG: 1 TABLET ORAL at 08:32

## 2020-01-01 RX ADMIN — ZINC SULFATE 220 MG (50 MG) CAPSULE 220 MG: CAPSULE at 08:31

## 2020-01-01 RX ADMIN — Medication 250 MG: at 19:29

## 2020-01-01 RX ADMIN — ALBUMIN (HUMAN) 12.5 G: 12.5 INJECTION, SOLUTION INTRAVENOUS at 14:38

## 2020-01-01 RX ADMIN — METOPROLOL SUCCINATE 25 MG: 25 TABLET, EXTENDED RELEASE ORAL at 11:31

## 2020-01-01 RX ADMIN — FAMOTIDINE 20 MG: 20 TABLET ORAL at 17:50

## 2020-01-01 RX ADMIN — METOPROLOL SUCCINATE 25 MG: 25 TABLET, EXTENDED RELEASE ORAL at 08:11

## 2020-01-01 RX ADMIN — METOPROLOL SUCCINATE 50 MG: 50 TABLET, EXTENDED RELEASE ORAL at 08:46

## 2020-01-01 RX ADMIN — ANASTROZOLE 1 MG: 1 TABLET ORAL at 08:17

## 2020-01-01 RX ADMIN — NYSTATIN 1 APPLICATION: 100000 POWDER TOPICAL at 17:47

## 2020-01-01 RX ADMIN — ATORVASTATIN CALCIUM 40 MG: 40 TABLET, FILM COATED ORAL at 21:13

## 2020-01-01 RX ADMIN — DOXYCYCLINE 100 MG: 100 CAPSULE ORAL at 10:02

## 2020-01-01 RX ADMIN — DOCUSATE SODIUM 100 MG: 100 CAPSULE, LIQUID FILLED ORAL at 08:03

## 2020-01-01 RX ADMIN — FAMOTIDINE 20 MG: 20 TABLET ORAL at 09:54

## 2020-01-01 RX ADMIN — ANASTROZOLE 1 MG: 1 TABLET ORAL at 08:03

## 2020-01-01 RX ADMIN — ENOXAPARIN SODIUM 40 MG: 40 INJECTION SUBCUTANEOUS at 09:53

## 2020-01-01 RX ADMIN — VITAMIN D 2000 UNITS: 25 TAB ORAL at 08:48

## 2020-01-01 RX ADMIN — APIXABAN 5 MG: 5 TABLET, FILM COATED ORAL at 18:33

## 2020-01-01 RX ADMIN — POTASSIUM CHLORIDE 20 MEQ: 1500 TABLET, EXTENDED RELEASE ORAL at 08:02

## 2020-01-01 RX ADMIN — FAMOTIDINE 20 MG: 20 TABLET ORAL at 08:09

## 2020-01-01 RX ADMIN — APIXABAN 5 MG: 5 TABLET, FILM COATED ORAL at 18:05

## 2020-01-01 RX ADMIN — OXYCODONE HYDROCHLORIDE AND ACETAMINOPHEN 1000 MG: 500 TABLET ORAL at 21:24

## 2020-01-01 RX ADMIN — VITAMIN D 2000 UNITS: 25 TAB ORAL at 09:16

## 2020-01-01 RX ADMIN — APIXABAN 5 MG: 5 TABLET, FILM COATED ORAL at 08:49

## 2020-01-01 RX ADMIN — FUROSEMIDE 20 MG: 20 TABLET ORAL at 13:15

## 2020-01-01 RX ADMIN — FUROSEMIDE 20 MG: 20 TABLET ORAL at 13:06

## 2020-01-01 RX ADMIN — VITAMIN D 2000 UNITS: 25 TAB ORAL at 08:31

## 2020-01-01 RX ADMIN — SODIUM CHLORIDE 1000 ML: 0.9 INJECTION, SOLUTION INTRAVENOUS at 12:30

## 2020-01-01 RX ADMIN — PHENYLEPHRINE HYDROCHLORIDE 25 MCG/MIN: 10 INJECTION INTRAVENOUS at 23:58

## 2020-01-01 RX ADMIN — LISINOPRIL 5 MG: 5 TABLET ORAL at 10:00

## 2020-01-01 RX ADMIN — ANASTROZOLE 1 MG: 1 TABLET ORAL at 08:09

## 2020-01-01 RX ADMIN — SODIUM CHLORIDE 100 ML/HR: 0.9 INJECTION, SOLUTION INTRAVENOUS at 23:57

## 2020-01-01 RX ADMIN — APIXABAN 5 MG: 5 TABLET, FILM COATED ORAL at 17:00

## 2020-01-01 RX ADMIN — FUROSEMIDE 20 MG: 20 TABLET ORAL at 13:31

## 2020-01-01 RX ADMIN — DEXAMETHASONE SODIUM PHOSPHATE 6 MG: 4 INJECTION, SOLUTION INTRA-ARTICULAR; INTRALESIONAL; INTRAMUSCULAR; INTRAVENOUS; SOFT TISSUE at 08:53

## 2020-01-01 RX ADMIN — FAMOTIDINE 20 MG: 20 TABLET ORAL at 17:08

## 2020-01-01 RX ADMIN — APIXABAN 5 MG: 5 TABLET, FILM COATED ORAL at 17:18

## 2020-01-01 RX ADMIN — SODIUM CHLORIDE 100 ML/HR: 0.9 INJECTION, SOLUTION INTRAVENOUS at 08:47

## 2020-01-01 RX ADMIN — Medication 1 TABLET: at 09:17

## 2020-01-01 RX ADMIN — ALBUTEROL SULFATE 5 MG: 2.5 SOLUTION RESPIRATORY (INHALATION) at 17:59

## 2020-01-01 RX ADMIN — DEXAMETHASONE SODIUM PHOSPHATE 6 MG: 4 INJECTION, SOLUTION INTRA-ARTICULAR; INTRALESIONAL; INTRAMUSCULAR; INTRAVENOUS; SOFT TISSUE at 08:27

## 2020-01-01 RX ADMIN — POTASSIUM CHLORIDE 20 MEQ: 1500 TABLET, EXTENDED RELEASE ORAL at 17:12

## 2020-01-01 RX ADMIN — ALBUMIN (HUMAN) 12.5 G: 0.25 INJECTION, SOLUTION INTRAVENOUS at 10:30

## 2020-01-01 RX ADMIN — OXYCODONE HYDROCHLORIDE AND ACETAMINOPHEN 1000 MG: 500 TABLET ORAL at 08:53

## 2020-01-01 RX ADMIN — OXYCODONE HYDROCHLORIDE AND ACETAMINOPHEN 1000 MG: 500 TABLET ORAL at 08:09

## 2020-01-01 RX ADMIN — ATORVASTATIN CALCIUM 40 MG: 40 TABLET, FILM COATED ORAL at 22:25

## 2020-01-01 RX ADMIN — FUROSEMIDE 20 MG: 20 TABLET ORAL at 08:30

## 2020-01-01 RX ADMIN — FAMOTIDINE 20 MG: 20 TABLET ORAL at 17:11

## 2020-01-01 RX ADMIN — FAMOTIDINE 20 MG: 20 TABLET ORAL at 08:17

## 2020-01-01 RX ADMIN — APIXABAN 5 MG: 5 TABLET, FILM COATED ORAL at 17:50

## 2020-01-01 RX ADMIN — FUROSEMIDE 20 MG: 20 TABLET ORAL at 20:31

## 2020-01-01 RX ADMIN — CEFTRIAXONE 1000 MG: 1 INJECTION, SOLUTION INTRAVENOUS at 06:09

## 2020-01-01 RX ADMIN — POTASSIUM CHLORIDE 20 MEQ: 1500 TABLET, EXTENDED RELEASE ORAL at 09:06

## 2020-01-01 RX ADMIN — CALCIUM 1 TABLET: 500 TABLET ORAL at 08:02

## 2020-01-01 RX ADMIN — NYSTATIN 1 APPLICATION: 100000 POWDER TOPICAL at 17:05

## 2020-01-01 RX ADMIN — ANASTROZOLE 1 MG: 1 TABLET ORAL at 08:30

## 2020-01-01 RX ADMIN — CALCIUM 1 TABLET: 500 TABLET ORAL at 09:07

## 2020-01-01 RX ADMIN — DOXYCYCLINE 100 MG: 100 CAPSULE ORAL at 20:30

## 2020-01-01 RX ADMIN — REMDESIVIR 200 MG: 5 INJECTION INTRAVENOUS at 21:00

## 2020-01-01 RX ADMIN — FUROSEMIDE 20 MG: 20 TABLET ORAL at 16:53

## 2020-01-01 RX ADMIN — ATORVASTATIN CALCIUM 40 MG: 40 TABLET, FILM COATED ORAL at 21:49

## 2020-01-01 RX ADMIN — METOPROLOL SUCCINATE 25 MG: 25 TABLET, EXTENDED RELEASE ORAL at 08:09

## 2020-01-01 RX ADMIN — REMDESIVIR 100 MG: 100 INJECTION, POWDER, LYOPHILIZED, FOR SOLUTION INTRAVENOUS at 18:33

## 2020-01-01 RX ADMIN — VITAMIN D 2000 UNITS: 25 TAB ORAL at 08:53

## 2020-01-01 RX ADMIN — APIXABAN 5 MG: 5 TABLET, FILM COATED ORAL at 17:47

## 2020-01-01 RX ADMIN — OXYCODONE HYDROCHLORIDE AND ACETAMINOPHEN 1000 MG: 500 TABLET ORAL at 08:48

## 2020-01-01 RX ADMIN — ALBUMIN (HUMAN) 12.5 G: 12.5 INJECTION, SOLUTION INTRAVENOUS at 02:27

## 2020-01-01 RX ADMIN — CALCIUM 1 TABLET: 500 TABLET ORAL at 07:39

## 2020-01-01 RX ADMIN — APIXABAN 5 MG: 5 TABLET, FILM COATED ORAL at 17:10

## 2020-01-01 RX ADMIN — OXYCODONE HYDROCHLORIDE AND ACETAMINOPHEN 1000 MG: 500 TABLET ORAL at 08:31

## 2020-01-01 RX ADMIN — DEXAMETHASONE SODIUM PHOSPHATE 6 MG: 4 INJECTION, SOLUTION INTRA-ARTICULAR; INTRALESIONAL; INTRAMUSCULAR; INTRAVENOUS; SOFT TISSUE at 23:43

## 2020-01-01 RX ADMIN — VITAMIN D 2000 UNITS: 25 TAB ORAL at 08:17

## 2020-01-01 RX ADMIN — OXYCODONE HYDROCHLORIDE AND ACETAMINOPHEN 1000 MG: 500 TABLET ORAL at 21:29

## 2020-01-01 RX ADMIN — FUROSEMIDE 20 MG: 20 TABLET ORAL at 17:11

## 2020-01-01 RX ADMIN — DEXAMETHASONE SODIUM PHOSPHATE 6 MG: 4 INJECTION, SOLUTION INTRA-ARTICULAR; INTRALESIONAL; INTRAMUSCULAR; INTRAVENOUS; SOFT TISSUE at 08:01

## 2020-01-01 RX ADMIN — VITAMIN D 2000 UNITS: 25 TAB ORAL at 18:00

## 2020-01-01 RX ADMIN — SODIUM POLYSTYRENE SULFONATE 15 G: 4.1 POWDER, FOR SUSPENSION ORAL; RECTAL at 18:15

## 2020-01-01 RX ADMIN — ACETAMINOPHEN 650 MG: 325 TABLET ORAL at 16:08

## 2020-01-01 RX ADMIN — DEXAMETHASONE SODIUM PHOSPHATE 6 MG: 4 INJECTION, SOLUTION INTRA-ARTICULAR; INTRALESIONAL; INTRAMUSCULAR; INTRAVENOUS; SOFT TISSUE at 08:09

## 2020-01-01 RX ADMIN — ZINC SULFATE 220 MG (50 MG) CAPSULE 220 MG: CAPSULE at 08:26

## 2020-01-01 RX ADMIN — VITAMIN D 2000 UNITS: 25 TAB ORAL at 08:02

## 2020-01-01 RX ADMIN — ATORVASTATIN CALCIUM 40 MG: 40 TABLET, FILM COATED ORAL at 20:32

## 2020-01-01 RX ADMIN — MELATONIN 6 MG: at 22:12

## 2020-01-01 RX ADMIN — APIXABAN 5 MG: 5 TABLET, FILM COATED ORAL at 19:29

## 2020-01-01 RX ADMIN — METOPROLOL SUCCINATE 50 MG: 50 TABLET, EXTENDED RELEASE ORAL at 08:17

## 2020-01-01 RX ADMIN — CALCIUM 1 TABLET: 500 TABLET ORAL at 08:05

## 2020-01-01 RX ADMIN — NYSTATIN: 100000 POWDER TOPICAL at 09:08

## 2020-01-01 RX ADMIN — METOPROLOL SUCCINATE 25 MG: 25 TABLET, EXTENDED RELEASE ORAL at 09:05

## 2020-01-01 RX ADMIN — ZINC SULFATE 220 MG (50 MG) CAPSULE 220 MG: CAPSULE at 08:02

## 2020-01-01 RX ADMIN — NYSTATIN: 100000 POWDER TOPICAL at 12:17

## 2020-01-01 RX ADMIN — CALCIUM 1 TABLET: 500 TABLET ORAL at 08:03

## 2020-01-01 RX ADMIN — FAMOTIDINE 20 MG: 20 TABLET ORAL at 17:09

## 2020-01-01 RX ADMIN — OXYCODONE HYDROCHLORIDE AND ACETAMINOPHEN 1000 MG: 500 TABLET ORAL at 22:00

## 2020-01-01 RX ADMIN — ATORVASTATIN CALCIUM 40 MG: 40 TABLET, FILM COATED ORAL at 21:24

## 2020-01-01 RX ADMIN — METOPROLOL SUCCINATE 50 MG: 50 TABLET, EXTENDED RELEASE ORAL at 09:08

## 2020-01-01 RX ADMIN — FAMOTIDINE 20 MG: 20 TABLET ORAL at 18:05

## 2020-01-01 RX ADMIN — APIXABAN 5 MG: 5 TABLET, FILM COATED ORAL at 17:30

## 2020-01-01 RX ADMIN — FUROSEMIDE 20 MG: 20 TABLET ORAL at 06:10

## 2020-01-01 RX ADMIN — LISINOPRIL 5 MG: 5 TABLET ORAL at 08:08

## 2020-01-01 RX ADMIN — FUROSEMIDE 20 MG: 20 TABLET ORAL at 18:05

## 2020-01-01 RX ADMIN — CALCIUM 1 TABLET: 500 TABLET ORAL at 08:00

## 2020-01-01 RX ADMIN — SODIUM CHLORIDE, SODIUM GLUCONATE, SODIUM ACETATE, POTASSIUM CHLORIDE, MAGNESIUM CHLORIDE, SODIUM PHOSPHATE, DIBASIC, AND POTASSIUM PHOSPHATE 75 ML/HR: .53; .5; .37; .037; .03; .012; .00082 INJECTION, SOLUTION INTRAVENOUS at 12:08

## 2020-01-01 RX ADMIN — APIXABAN 5 MG: 5 TABLET, FILM COATED ORAL at 17:05

## 2020-01-01 RX ADMIN — APIXABAN 5 MG: 5 TABLET, FILM COATED ORAL at 17:09

## 2020-01-01 RX ADMIN — POTASSIUM CHLORIDE 20 MEQ: 1500 TABLET, EXTENDED RELEASE ORAL at 08:42

## 2020-01-01 RX ADMIN — ALBUMIN (HUMAN) 12.5 G: 12.5 INJECTION, SOLUTION INTRAVENOUS at 01:35

## 2020-01-01 RX ADMIN — ANASTROZOLE 1 MG: 1 TABLET ORAL at 10:03

## 2020-01-01 RX ADMIN — POTASSIUM CHLORIDE 20 MEQ: 1500 TABLET, EXTENDED RELEASE ORAL at 10:30

## 2020-01-01 RX ADMIN — ZINC SULFATE 220 MG (50 MG) CAPSULE 220 MG: CAPSULE at 09:54

## 2020-01-01 RX ADMIN — FUROSEMIDE 20 MG: 20 TABLET ORAL at 09:53

## 2020-01-01 RX ADMIN — SODIUM CHLORIDE 100 ML/HR: 0.9 INJECTION, SOLUTION INTRAVENOUS at 17:26

## 2020-01-01 RX ADMIN — METOPROLOL SUCCINATE 25 MG: 25 TABLET, EXTENDED RELEASE ORAL at 08:26

## 2020-01-01 RX ADMIN — ALBUMIN (HUMAN) 12.5 G: 12.5 INJECTION, SOLUTION INTRAVENOUS at 19:54

## 2020-01-01 RX ADMIN — APIXABAN 5 MG: 5 TABLET, FILM COATED ORAL at 08:18

## 2020-01-01 RX ADMIN — FUROSEMIDE 20 MG: 20 TABLET ORAL at 15:07

## 2020-01-01 RX ADMIN — FUROSEMIDE 40 MG: 10 INJECTION, SOLUTION INTRAMUSCULAR; INTRAVENOUS at 08:17

## 2020-01-01 RX ADMIN — ZINC SULFATE 220 MG (50 MG) CAPSULE 220 MG: CAPSULE at 08:53

## 2020-01-01 RX ADMIN — ALBUMIN (HUMAN) 12.5 G: 0.25 INJECTION, SOLUTION INTRAVENOUS at 17:19

## 2020-01-01 RX ADMIN — VITAMIN D 2000 UNITS: 25 TAB ORAL at 09:07

## 2020-01-01 RX ADMIN — ANASTROZOLE 1 MG: 1 TABLET ORAL at 08:04

## 2020-01-01 RX ADMIN — VITAMIN D 2000 UNITS: 25 TAB ORAL at 08:03

## 2020-01-01 RX ADMIN — ANASTROZOLE 1 MG: 1 TABLET ORAL at 09:08

## 2020-01-01 RX ADMIN — CALCIUM 1 TABLET: 500 TABLET ORAL at 08:08

## 2020-01-01 RX ADMIN — REMDESIVIR 100 MG: 100 INJECTION, POWDER, LYOPHILIZED, FOR SOLUTION INTRAVENOUS at 17:17

## 2020-01-01 RX ADMIN — VITAMIN D 2000 UNITS: 25 TAB ORAL at 08:05

## 2020-01-01 RX ADMIN — POTASSIUM CHLORIDE 20 MEQ: 1500 TABLET, EXTENDED RELEASE ORAL at 08:03

## 2020-01-01 RX ADMIN — OXYCODONE HYDROCHLORIDE AND ACETAMINOPHEN 1000 MG: 500 TABLET ORAL at 08:26

## 2020-01-01 RX ADMIN — VITAMIN D 2000 UNITS: 25 TAB ORAL at 08:27

## 2020-01-01 RX ADMIN — MELATONIN 6 MG: at 21:49

## 2020-01-01 RX ADMIN — FUROSEMIDE 20 MG: 20 TABLET ORAL at 18:33

## 2020-01-01 RX ADMIN — ATORVASTATIN CALCIUM 40 MG: 40 TABLET, FILM COATED ORAL at 21:35

## 2020-01-01 RX ADMIN — CALCIUM 1 TABLET: 500 TABLET ORAL at 08:27

## 2020-01-01 RX ADMIN — NYSTATIN: 100000 POWDER TOPICAL at 16:35

## 2020-01-01 RX ADMIN — APIXABAN 5 MG: 5 TABLET, FILM COATED ORAL at 08:04

## 2020-01-01 RX ADMIN — ATORVASTATIN CALCIUM 40 MG: 40 TABLET, FILM COATED ORAL at 21:03

## 2020-01-01 RX ADMIN — DEXAMETHASONE SODIUM PHOSPHATE 6 MG: 4 INJECTION, SOLUTION INTRA-ARTICULAR; INTRALESIONAL; INTRAMUSCULAR; INTRAVENOUS; SOFT TISSUE at 08:49

## 2020-01-01 RX ADMIN — OXYCODONE HYDROCHLORIDE AND ACETAMINOPHEN 1000 MG: 500 TABLET ORAL at 21:00

## 2020-01-01 RX ADMIN — ANASTROZOLE 1 MG: 1 TABLET ORAL at 08:02

## 2020-01-01 RX ADMIN — FAMOTIDINE 20 MG: 20 TABLET ORAL at 17:47

## 2020-01-01 RX ADMIN — CEFTRIAXONE 1000 MG: 1 INJECTION, SOLUTION INTRAVENOUS at 06:46

## 2020-01-01 RX ADMIN — FAMOTIDINE 20 MG: 20 TABLET ORAL at 18:33

## 2020-01-01 RX ADMIN — NYSTATIN: 100000 POWDER TOPICAL at 17:08

## 2020-01-01 RX ADMIN — SODIUM CHLORIDE 500 ML: 0.9 INJECTION, SOLUTION INTRAVENOUS at 22:34

## 2020-01-01 RX ADMIN — LISINOPRIL 5 MG: 5 TABLET ORAL at 08:46

## 2020-01-01 RX ADMIN — Medication 1 TABLET: at 08:30

## 2020-01-01 RX ADMIN — ALBUMIN (HUMAN) 12.5 G: 12.5 INJECTION, SOLUTION INTRAVENOUS at 09:06

## 2020-01-01 RX ADMIN — APIXABAN 5 MG: 5 TABLET, FILM COATED ORAL at 17:12

## 2020-01-01 RX ADMIN — FUROSEMIDE 40 MG: 10 INJECTION, SOLUTION INTRAMUSCULAR; INTRAVENOUS at 10:30

## 2020-01-01 RX ADMIN — DOXYCYCLINE 100 MG: 100 CAPSULE ORAL at 21:13

## 2020-01-01 RX ADMIN — DEXAMETHASONE SODIUM PHOSPHATE 6 MG: 4 INJECTION, SOLUTION INTRA-ARTICULAR; INTRALESIONAL; INTRAMUSCULAR; INTRAVENOUS; SOFT TISSUE at 08:43

## 2020-01-01 RX ADMIN — ATORVASTATIN CALCIUM 40 MG: 40 TABLET, FILM COATED ORAL at 22:13

## 2020-01-01 RX ADMIN — APIXABAN 5 MG: 5 TABLET, FILM COATED ORAL at 17:11

## 2020-01-01 RX ADMIN — APIXABAN 5 MG: 5 TABLET, FILM COATED ORAL at 08:26

## 2020-01-01 RX ADMIN — FUROSEMIDE 20 MG: 20 TABLET ORAL at 11:07

## 2020-01-01 RX ADMIN — ANASTROZOLE 1 MG: 1 TABLET ORAL at 08:53

## 2020-01-01 RX ADMIN — METOPROLOL SUCCINATE 25 MG: 25 TABLET, EXTENDED RELEASE ORAL at 08:01

## 2020-01-01 RX ADMIN — ZINC SULFATE 220 MG (50 MG) CAPSULE 220 MG: CAPSULE at 08:09

## 2020-01-01 RX ADMIN — FUROSEMIDE 20 MG: 20 TABLET ORAL at 21:13

## 2020-01-01 RX ADMIN — OXYCODONE HYDROCHLORIDE AND ACETAMINOPHEN 1000 MG: 500 TABLET ORAL at 20:30

## 2020-01-01 RX ADMIN — FAMOTIDINE 20 MG: 20 TABLET ORAL at 09:17

## 2020-01-01 RX ADMIN — VITAMIN D 2000 UNITS: 25 TAB ORAL at 08:41

## 2020-01-01 RX ADMIN — MELATONIN 6 MG: at 21:04

## 2020-01-01 RX ADMIN — FAMOTIDINE 20 MG: 20 TABLET ORAL at 08:49

## 2020-01-01 RX ADMIN — ACETAMINOPHEN 650 MG: 325 TABLET ORAL at 01:25

## 2020-01-01 RX ADMIN — CALCIUM 1 TABLET: 500 TABLET ORAL at 08:17

## 2020-01-01 RX ADMIN — METOPROLOL SUCCINATE 25 MG: 25 TABLET, EXTENDED RELEASE ORAL at 08:32

## 2020-01-01 RX ADMIN — FUROSEMIDE 20 MG: 10 INJECTION, SOLUTION INTRAMUSCULAR; INTRAVENOUS at 12:29

## 2020-01-01 RX ADMIN — ALBUMIN (HUMAN) 12.5 G: 12.5 INJECTION, SOLUTION INTRAVENOUS at 12:08

## 2020-01-01 RX ADMIN — OXYCODONE HYDROCHLORIDE AND ACETAMINOPHEN 1000 MG: 500 TABLET ORAL at 21:48

## 2020-01-01 RX ADMIN — ATORVASTATIN CALCIUM 40 MG: 40 TABLET, FILM COATED ORAL at 22:49

## 2020-01-01 RX ADMIN — APIXABAN 5 MG: 5 TABLET, FILM COATED ORAL at 08:03

## 2020-01-01 RX ADMIN — FAMOTIDINE 20 MG: 20 TABLET ORAL at 08:53

## 2020-01-01 RX ADMIN — LISINOPRIL 5 MG: 5 TABLET ORAL at 08:11

## 2020-01-01 RX ADMIN — APIXABAN 5 MG: 5 TABLET, FILM COATED ORAL at 09:08

## 2020-01-01 RX ADMIN — FAMOTIDINE 20 MG: 20 TABLET ORAL at 18:54

## 2020-01-01 RX ADMIN — APIXABAN 5 MG: 5 TABLET, FILM COATED ORAL at 08:30

## 2020-01-01 RX ADMIN — ALBUMIN (HUMAN) 12.5 G: 12.5 INJECTION, SOLUTION INTRAVENOUS at 08:18

## 2020-01-01 RX ADMIN — LISINOPRIL 5 MG: 5 TABLET ORAL at 08:53

## 2020-01-01 RX ADMIN — OXYCODONE HYDROCHLORIDE AND ACETAMINOPHEN 1000 MG: 500 TABLET ORAL at 21:13

## 2020-01-01 RX ADMIN — FUROSEMIDE 20 MG: 20 TABLET ORAL at 21:01

## 2020-01-01 RX ADMIN — NYSTATIN: 100000 POWDER TOPICAL at 23:37

## 2020-01-01 RX ADMIN — FUROSEMIDE 20 MG: 20 TABLET ORAL at 08:48

## 2020-01-01 RX ADMIN — ATORVASTATIN CALCIUM 40 MG: 40 TABLET, FILM COATED ORAL at 21:30

## 2020-01-01 RX ADMIN — CALCIUM 1 TABLET: 500 TABLET ORAL at 09:55

## 2020-01-01 RX ADMIN — LISINOPRIL 5 MG: 5 TABLET ORAL at 08:02

## 2020-01-01 RX ADMIN — APIXABAN 5 MG: 5 TABLET, FILM COATED ORAL at 08:42

## 2020-01-01 RX ADMIN — CEFEPIME HYDROCHLORIDE 2000 MG: 2 INJECTION, SOLUTION INTRAVENOUS at 15:27

## 2020-01-01 RX ADMIN — FUROSEMIDE 20 MG: 20 TABLET ORAL at 17:50

## 2020-01-01 RX ADMIN — APIXABAN 5 MG: 5 TABLET, FILM COATED ORAL at 08:02

## 2020-01-01 RX ADMIN — ATORVASTATIN CALCIUM 40 MG: 40 TABLET, FILM COATED ORAL at 21:48

## 2020-01-01 RX ADMIN — METOPROLOL SUCCINATE 50 MG: 50 TABLET, EXTENDED RELEASE ORAL at 08:30

## 2020-01-01 RX ADMIN — DOCUSATE SODIUM 100 MG: 100 CAPSULE, LIQUID FILLED ORAL at 17:47

## 2020-01-01 RX ADMIN — FUROSEMIDE 20 MG: 20 TABLET ORAL at 05:35

## 2020-01-01 RX ADMIN — SODIUM CHLORIDE 100 ML/HR: 0.9 INJECTION, SOLUTION INTRAVENOUS at 23:43

## 2020-01-01 RX ADMIN — REMDESIVIR 100 MG: 100 INJECTION, POWDER, LYOPHILIZED, FOR SOLUTION INTRAVENOUS at 17:52

## 2020-01-01 RX ADMIN — FAMOTIDINE 20 MG: 20 TABLET ORAL at 08:02

## 2020-01-01 RX ADMIN — DEXAMETHASONE SODIUM PHOSPHATE 6 MG: 4 INJECTION, SOLUTION INTRA-ARTICULAR; INTRALESIONAL; INTRAMUSCULAR; INTRAVENOUS; SOFT TISSUE at 09:53

## 2020-01-01 RX ADMIN — APIXABAN 5 MG: 5 TABLET, FILM COATED ORAL at 08:32

## 2020-01-01 RX ADMIN — METOPROLOL SUCCINATE 50 MG: 50 TABLET, EXTENDED RELEASE ORAL at 09:16

## 2020-01-01 RX ADMIN — ANASTROZOLE 1 MG: 1 TABLET ORAL at 08:42

## 2020-01-01 RX ADMIN — ANASTROZOLE 1 MG: 1 TABLET ORAL at 08:26

## 2020-01-01 RX ADMIN — ALBUMIN (HUMAN) 12.5 G: 12.5 INJECTION, SOLUTION INTRAVENOUS at 19:24

## 2020-01-01 RX ADMIN — MELATONIN 6 MG: at 21:35

## 2020-01-01 RX ADMIN — POTASSIUM CHLORIDE 20 MEQ: 1500 TABLET, EXTENDED RELEASE ORAL at 09:16

## 2020-01-01 RX ADMIN — FUROSEMIDE 20 MG: 20 TABLET ORAL at 05:20

## 2020-01-01 RX ADMIN — ANASTROZOLE 1 MG: 1 TABLET ORAL at 13:33

## 2020-02-11 PROBLEM — I50.22 CHRONIC SYSTOLIC CONGESTIVE HEART FAILURE (HCC): Status: ACTIVE | Noted: 2020-01-01

## 2020-02-11 NOTE — ASSESSMENT & PLAN NOTE
Wt Readings from Last 3 Encounters:   02/11/20 111 kg (244 lb)   12/12/19 108 kg (237 lb)   11/13/18 107 kg (236 lb)     sHF with EF of 35% on last echo  Weight gain with significant edema after stopping lasix as instructed       Weight-based regimen as follows:   Weigh your self every morning with similar clothing after you void     If your weight is greater than 238 take Lasix 40 mg (Two Tablets) and Potassium 20 meq (Two tablets)    If your weight is 232-238 take lasix 20 mg ( one tablet) and Potassium 10 Meq(one tablet)    If your weight is less than 232 do not take lasix (No Potassium)

## 2020-02-11 NOTE — PROGRESS NOTES
[facility]   Outpatient Note  Luis E Henriquez [facility]  1945  [id number]  [facility] [place]  [facility] [id number] is a 69 y o  female    Assessment and Plan:   1  Nonrheumatic mitral valve regurgitation  Assessment & Plan: Moderate per echo      2  Persistent atrial fibrillation  Assessment & Plan:  Rate controlled on BB  On ASA  Should be on full AC   Will discuss at next visit       3  Primary cardiomyopathy ([address] )  Assessment & Plan:  Likely adriamycin related  Echo shows EF to be 35-40%    Orders:  -     Basic metabolic panel  -     NT-BNP PRO; Future    4  Benign essential hypertension  Assessment & Plan:  Controlled on current medical regimen      Orders:  -     Basic metabolic panel  -     NT-BNP PRO; Future    5  Class 2 obesity due to excess calories without serious comorbidity with body mass index (BMI) of 37 0 to 37 9 in adult    6  Chronic systolic congestive heart failure (HCC)  Assessment & Plan:  Wt Readings from Last 3 Encounters:   02/11/20 111 kg (244 lb)   12/12/19 108 kg (237 lb)   11/13/18 107 kg (236 lb)     sHF with EF of 35% on last echo  Weight gain with significant edema after stopping lasix as instructed  Weight-based regimen as follows:   Weigh your self every morning with similar clothing after you void     If your weight is greater than 238 take Lasix 40 mg (Two Tablets) and Potassium 20 meq (Two tablets)    If your weight is 232-238 take lasix 20 mg ( one tablet) and Potassium 10 Meq(one tablet)    If your weight is less than 232 do not take lasix (No Potassium)            Orders:  -     furosemide (LASIX) 20 mg tablet; Take 1 tablet (20 mg total) by mouth 2 (two) times a day  -     potassium chloride (K-DUR,KLOR-CON) 10 mEq tablet;  Take 1 tablet (10 mEq total) by mouth 2 (two) times a day Take 1-2 tabs according to instructions  -     Basic metabolic panel  -     NT-BNP PRO; Future       Additional Plan:   Medications as detailed above  Pertinent testing  and Lab orders as outlined  Return visit will be in one month or earlier if there are problems  The patient is encouraged to call in the meantime if there are questions or concerns  Subjective: The patient is seen in the office for sHF with an EF of 35-40% on Echo  She has CM that is thought to be from adriamycin  She has been noticing weight gain edema, orhtopnea and SOB  She has about a 14 lb weight gain since she stopped her lasix as instructed at last visit  Otherwise from a cardiac perspective, she is doing well without complaints of chest pain  She has no palpitations syncope or near syncope, and denies PND  She does not complain of TIA or CVA symptoms  Social History  Social History     Tobacco Use   Smoking Status Former Smoker   Smokeless Tobacco Never Used   ,   Social History     Substance and Sexual Activity   Alcohol Use No   ,   Social History     Substance and Sexual Activity   Drug Use Not on file     History reviewed  No pertinent family history      Medical and Surgical History  Past Medical History:   Diagnosis Date    Breast cancer [facility])     s/p right mastectomy    Dilated cardiomyopathy ([facility] )     Essential (primary) hypertension     Hx of echocardiogram 06/05/2015    EF0 45 (45%) mild mitral regurg   / 2D w/CFD; EF0 43 (43%) mild MR 11/25/16    Mitral regurgitation     [facility]     Nonrheumatic mitral (valve) insufficiency     Obesity      Past Surgical History:   Procedure Laterality Date    MASTECTOMY Right 2018    TOTAL KNEE ARTHROPLASTY Right 2013         Current Outpatient Medications:     anastrozole (ARIMIDEX) 1 mg tablet, Take 1 mg by mouth daily, Disp: , Rfl:     aspirin (ECOTRIN LOW STRENGTH) 81 mg EC tablet, Take 81 mg by mouth daily, Disp: , Rfl:     furosemide (LASIX) 20 mg tablet, Take 1 tablet (20 mg total) by mouth 2 (two) times a day, Disp: 45 tablet, Rfl: 3    lisinopril (ZESTRIL) 10 mg tablet, Take 1 tablet (10 mg total) by mouth daily, Disp: 90 tablet, Rfl: 4    metoprolol succinate (TOPROL-XL) 50 mg 24 hr tablet, Take 1 tablet (50 mg total) by mouth daily, Disp: 90 tablet, Rfl: 4    potassium chloride (K-DUR,KLOR-CON) 10 mEq tablet, Take 1 tablet (10 mEq total) by mouth 2 (two) times a day Take 1-2 tabs according to instructions, Disp: 45 tablet, Rfl: 3  Allergies   Allergen Reactions    Celecoxib Other (See Comments)    Other Other (See Comments)     Skin irritated and opened up       Review of Systems   Constitution: Negative  HENT: Negative  Eyes: Negative  Cardiovascular: Positive for leg swelling (to the thighs) and orthopnea  Negative for chest pain, claudication, cyanosis, dyspnea on exertion, irregular heartbeat, near-syncope, palpitations, paroxysmal nocturnal dyspnea and syncope  Respiratory: Positive for shortness of breath  Negative for cough, hemoptysis, sleep disturbances due to breathing, snoring, sputum production and wheezing  Endocrine: Negative  Hematologic/Lymphatic: Negative  Skin: Negative  Musculoskeletal: Negative  Gastrointestinal: Negative  Genitourinary: Negative  Neurological: Negative  Psychiatric/Behavioral: Negative  Allergic/Immunologic: Negative  Objective:   /86   Pulse 72   Ht 5' 8" (1 727 m)   Wt 111 kg (244 lb)   BMI 37 10 kg/m²   Physical Exam   Constitutional: She is oriented to person, place, and time  She appears well-developed and well-nourished  HENT:   Head: Normocephalic and atraumatic  Mouth/Throat: Oropharynx is clear and moist    Eyes: Conjunctivae and EOM are normal  No scleral icterus  Neck: Normal range of motion  Neck supple  No JVD present  No tracheal deviation present  Cardiovascular: Normal rate, regular rhythm and intact distal pulses  PMI is displaced   Exam reveals no gallop and no friction rub  Murmur heard  Holosystolic murmur is present with a grade of 2/6 at the apex  Pulmonary/Chest: Effort normal  No respiratory distress  She has no wheezes  She has rales  She exhibits no tenderness  Abdominal: Soft  Bowel sounds are normal  She exhibits no distension  There is no tenderness  Aorta not palpable    Musculoskeletal: Normal range of motion  She exhibits edema (2+ to the thighs)  She exhibits no tenderness  Neurological: She is alert and oriented to person, place, and time  Skin: Skin is warm and dry  No rash noted  No erythema  No pallor  Psychiatric: She has a normal mood and affect  Her behavior is normal    Nursing note and vitals reviewed  Lab Review:   No results found for: CHOL  No results found for: HDL  No results found for: LDLCALC  No results found for: TRIG  Results Reviewed     None        Results Reviewed     None        Results Reviewed     None          Recent Cardiovascular Testing:   ECHO 12-27-19: LEFT VENTRICLE:  Size was at the upper limits of normal   Systolic function was moderately reduced  Ejection fraction was estimated in the range of 35 % to 40 %  There was moderate diffuse hypokinesis with regional variations  Wall thickness was normal    LEFT ATRIUM:The atrium was mildly dilated  MITRAL VALVE:There was mild calcification  There was moderate to severe regurgitation  TRICUSPID VALVE:There was moderate regurgitation  Estimated peak PA pressure was 45 mmHg  ECG Review:   12-12-19: Afib with controlled rate  Left axis  Otherwise WNL

## 2020-02-11 NOTE — PATIENT INSTRUCTIONS
Weigh your self every morning with similar clothing after you void     If your weight is greater than 238 take Lasix 40 mg (Two Tablets) and Potassium 20 meq (Two tablets)    If your weight is 232-238 take lasix 20 mg ( one tablet) and Potassium 10 Meq(one tablet)    If your weight is less than 232 do not take lasix (No Potassium)      Return in one month

## 2020-03-12 NOTE — ASSESSMENT & PLAN NOTE
Wt Readings from Last 3 Encounters:   02/11/20 111 kg (244 lb)   12/12/19 108 kg (237 lb)   11/13/18 107 kg (236 lb)     sHF with EF of 35% on last echo  Weight gain with significant edema after stopping lasix as instructed       Weight-based regimen as follows:   Weigh your self every morning with similar clothing after you void     If your weight is greater than 234 take Lasix 40 mg (Two Tablets) and Potassium 20 meq (Two tablets)    If your weight is 228-234 take lasix 20 mg ( one tablet) and Potassium 10 Meq(one tablet)    If your weight is less than 228 do not take lasix (No Potassium)    Elevate Your legs when sitting or when laying with Pillows     Wear compression stockings when standing

## 2020-03-12 NOTE — PROGRESS NOTES
[facility]   Outpatient Note  Jesus Grimaldo  1945  [id number]  [facility] [address]  [facility] John Stallings is a 69 y o  female    Assessment and Plan:   Chronic systolic congestive heart failure (HCC)  Wt Readings from Last 3 Encounters:   02/11/20 111 kg (244 lb)   12/12/19 108 kg (237 lb)   11/13/18 107 kg (236 lb)     sHF with EF of 35% on last echo  Weight gain with significant edema after stopping lasix as instructed  Weight-based regimen as follows:   Weigh your self every morning with similar clothing after you void     If your weight is greater than 234 take Lasix 40 mg (Two Tablets) and Potassium 20 meq (Two tablets)    If your weight is 228-234 take lasix 20 mg ( one tablet) and Potassium 10 Meq(one tablet)    If your weight is less than 228 do not take lasix (No Potassium)    Elevate Your legs when sitting or when laying with Pillows     Wear compression stockings when standing         Mitral regurgitation  Moderate per echo    Persistent atrial fibrillation  Rate controlled on BB  On ASA  Should be on full AC    Discussed stopping  ASA and starting NOAC  She will consider and let me know      Primary cardiomyopathy ([address] )  Likely adriamycin related  Echo shows EF to be 35-40%   Will reassess prior to next visit     Benign essential hypertension  Controlled on current medical regimen         Additional Plan:   Medications as detailed above  No testing is ordered today  Return visit will be in one month or earlier if there are problems  We will discuss NOAC  The patient is encouraged to call in the meantime if there are questions or concerns  Subjective: The patient is seen in the office for sHF with an EF of 35-40% on Echo in December of 2019  She has CM that is thought to be from adriamycin   Since her last visit she has been following a Lasix regimen and is feeling better but is still edematous and not at her dry weight  Otherwise from a cardiac perspective, she is doing well without complaints of chest pain  She has no palpitations syncope or near syncope, and denies PND  She does not complain of TIA or CVA symptoms  Social History  Social History     Tobacco Use   Smoking Status Former Smoker   Smokeless Tobacco Never Used   ,   Social History     Substance and Sexual Activity   Alcohol Use No   ,   Social History     Substance and Sexual Activity   Drug Use Not on file     History reviewed  No pertinent family history      Medical and Surgical History  Past Medical History:   Diagnosis Date    Breast cancer [facility])     s/p right mastectomy    [facility]     Essential (primary) hypertension     Hx of echocardiogram 06/05/2015    EF0 45 (45%) mild mitral regurg   / 2D w/CFD; EF0 43 (43%) mild MR 11/25/16    Mitral regurgitation     Non Hodg[facility]'s [facility] )     Nonrheumatic mitral (valve) insufficiency     Obesity      Past Surgical History:   Procedure Laterality Date    MASTECTOMY Right 2018    TOTAL KNEE ARTHROPLASTY Right 2013         Current Outpatient Medications:     anastrozole (ARIMIDEX) 1 mg tablet, Take 1 mg by mouth daily, Disp: , Rfl:     aspirin (ECOTRIN LOW STRENGTH) 81 mg EC tablet, Take 81 mg by mouth daily, Disp: , Rfl:     furosemide (LASIX) 20 mg tablet, Take 1 tablet (20 mg total) by mouth 2 (two) times a day, Disp: 45 tablet, Rfl: 3    lisinopril (ZESTRIL) 10 mg tablet, Take 1 tablet (10 mg total) by mouth daily, Disp: 90 tablet, Rfl: 4    metoprolol succinate (TOPROL-XL) 50 mg 24 hr tablet, Take 1 tablet (50 mg total) by mouth daily, Disp: 90 tablet, Rfl: 4    potassium chloride (K-DUR,KLOR-CON) 10 mEq tablet, Take 1 tablet (10 mEq total) by mouth 2 (two) times a day Take 1-2 tabs according to instructions, Disp: 45 tablet, Rfl: 3  Allergies   Allergen Reactions    Celecoxib Other (See Comments)    Other Other (See Comments)     Skin irritated and opened up       Review of Systems   Constitution: Negative  HENT: Negative  Eyes: Negative  Cardiovascular: Positive for leg swelling (to the thighs) and orthopnea  Negative for chest pain, claudication, cyanosis, dyspnea on exertion, irregular heartbeat, near-syncope, palpitations, paroxysmal nocturnal dyspnea and syncope  Respiratory: Negative for cough, hemoptysis, shortness of breath, sleep disturbances due to breathing, snoring, sputum production and wheezing  Endocrine: Negative  Hematologic/Lymphatic: Negative  Skin: Negative  Musculoskeletal: Negative  Gastrointestinal: Negative  Genitourinary: Negative  Neurological: Negative  Psychiatric/Behavioral: Negative  Allergic/Immunologic: Negative  Objective:   /80   Pulse 80   Ht 5' 8" (1 727 m)   Wt 108 kg (239 lb)   BMI 36 34 kg/m²   Physical Exam   Constitutional: She is oriented to person, place, and time  She appears well-developed and well-nourished  HENT:   Head: Normocephalic and atraumatic  Mouth/Throat: Oropharynx is clear and moist    Eyes: Conjunctivae and EOM are normal  No scleral icterus  Neck: Normal range of motion  Neck supple  No JVD present  No tracheal deviation present  Cardiovascular: Normal rate, regular rhythm and intact distal pulses  PMI is displaced  Exam reveals no gallop and no friction rub  Murmur heard  Holosystolic murmur is present with a grade of 2/6 at the apex  Pulmonary/Chest: Effort normal  No respiratory distress  She has no wheezes  She has no rales  She exhibits no tenderness  Abdominal: Soft  Bowel sounds are normal  She exhibits no distension  There is no tenderness  Aorta not palpable    Musculoskeletal: Normal range of motion  She exhibits edema (2+ to the thighs)  She exhibits no tenderness  Neurological: She is alert and oriented to person, place, and time     Skin: Skin is warm and dry  No rash noted  No erythema  No pallor  Psychiatric: She has a normal mood and affect  Her behavior is normal    Nursing note and vitals reviewed  Lab Review:   No results found for: CHOL  No results found for: HDL  No results found for: LDLCALC  No results found for: TRIG  Results Reviewed     None        Results Reviewed     None        Results Reviewed     None          Recent Cardiovascular Testing:   ECHO 12-27-19: LEFT VENTRICLE:  Size was at the upper limits of normal   Systolic function was moderately reduced  Ejection fraction was estimated in the range of 35 % to 40 %  There was moderate diffuse hypokinesis with regional variations  Wall thickness was normal    LEFT ATRIUM:The atrium was mildly dilated  MITRAL VALVE:There was mild calcification  There was moderate to severe regurgitation  TRICUSPID VALVE:There was moderate regurgitation  Estimated peak PA pressure was 45 mmHg  ECG Review:   12-12-19: Afib with controlled rate  Left axis  Otherwise WNL

## 2020-03-12 NOTE — PATIENT INSTRUCTIONS
Weigh your self every morning with similar clothing after you void     If your weight is greater than 234 take Lasix 40 mg (Two Tablets) and Potassium 20 meq (Two tablets)    If your weight is 228-234 take lasix 20 mg ( one tablet) and Potassium 10 Meq(one tablet)    If your weight is less than 228 do not take lasix (No Potassium)        Elevate Your legs when sitting or when laying with Pillows     Wear compression stockings when standing       Decide about Blood thinner  Eliquis 5 mg Twice daily  Pradaxa 150 twice daily  And Xarelto 20 mg once daily    Or  Coumadin/ Warfarin   Look into the cost      Return in one month

## 2020-03-12 NOTE — ASSESSMENT & PLAN NOTE
Rate controlled on BB  On ASA  Should be on full AC    Discussed stopping  ASA and starting NOAC  She will consider and let me know

## 2020-07-23 NOTE — ASSESSMENT & PLAN NOTE
Wt Readings from Last 3 Encounters:   06/25/20 103 kg (228 lb)   05/21/20 107 kg (235 lb)   04/16/20 104 kg (228 lb 8 oz)   sHF with EF of 35% on last echo   Weight much better after zaroxolyn for 5 days    Weight-based regimen as follows:   Weigh your self every morning with similar clothing after you void     If your weight is greater than 226 take Lasix 80 mg (fourTablets) and Potassium 40 meq (four tablets)    If your weight is 221-226 take lasix 40 mg ( two tablet) and Potassium 20 Meq( two tablet)    If your weight is less than 221 take lasix 20mg ( one tablet) and Potassium 10 Meq (one tablet)      Elevate Your legs when sitting or when laying with Pillows     Wear compression stockings when standing    Only has one at home will alternate legs when using

## 2020-07-23 NOTE — PROGRESS NOTES
[address] [facility]   Outpatient Note  [facility]  1945  [id number]  [facility] [id number] [phone]  Tara Natasha Mckeon is a 69 y o  female    Assessment and Plan:   Primary cardiomyopathy ([address] )  Likely adriamycin related  Echo shows EF to be 35-40%       Mitral regurgitation  Moderate per echo    Persistent atrial fibrillation  Rate controlled on BB  Now on Eliquis         Chronic systolic congestive heart failure (HCC)  Wt Readings from Last 3 Encounters:   06/25/20 103 kg (228 lb)   05/21/20 107 kg (235 lb)   04/16/20 104 kg (228 lb 8 oz)   sHF with EF of 35% on last echo   Weight much better after zaroxolyn for 5 days    Weight-based regimen as follows:   Weigh your self every morning with similar clothing after you void     If your weight is greater than 226 take Lasix 80 mg (fourTablets) and Potassium 40 meq (four tablets)    If your weight is 221-226 take lasix 40 mg ( two tablet) and Potassium 20 Meq( two tablet)    If your weight is less than 221 take lasix 20mg ( one tablet) and Potassium 10 Meq (one tablet)      Elevate Your legs when sitting or when laying with Pillows     Wear compression stockings when standing    Only has one at home will alternate legs when using                 Benign essential hypertension  Controlled on current medical regimen         Additional Plan:   Essentially she is to take Lasix 20 mg daily and return in 6 weeks  No Medication changes made or testing ordered today  Return visit will be in six weeks or earlier if there are problems  The patient is encouraged to call in the meantime if there are questions or concerns  Subjective: The patient is seen in the office for sHF with an EF of 35-40% on Echo in December of 2019  She has CM that is thought to be from adriamycin   Since her last visit she has been following a Lasix regimen and is feeling better  Zaroxolyn really helped  She is really excited to tell me she was able to lift a case of water up the stairs to her apartment with out "huffing and puffing " Her edema and weight are dramatically improved today  Otherwise from a cardiac perspective, she is doing well without complaints of chest pain  She has no palpitations syncope or near syncope, and denies PND  She does not complain of TIA or CVA symptoms  Atrial Fibrillation   Symptoms are negative for chest pain, palpitations, shortness of breath and syncope  Past medical history includes atrial fibrillation  Social History  Social History     Tobacco Use   Smoking Status Former Smoker   Smokeless Tobacco Never Used   ,   Social History     Substance and Sexual Activity   Alcohol Use No   ,   Social History     Substance and Sexual Activity   Drug Use Not on file     History reviewed  No pertinent family history      Medical and Surgical History  Past Medical History:   Diagnosis Date    [facility])     s/p right mastectomy    [facility]     Essential (primary) hypertension     Hx of echocardiogram 06/05/2015    EF0 45 (45%) mild mitral regurg   / 2D w/CFD; EF0 43 (43%) mild MR 11/25/16    Mitral regurgitation     [facility]     Nonrheumatic mitral (valve) insufficiency     Obesity     Persistent atrial fibrillation (HCC)      Past Surgical History:   Procedure Laterality Date    MASTECTOMY Right 2018    TOTAL KNEE ARTHROPLASTY Right 2013         Current Outpatient Medications:     anastrozole (ARIMIDEX) 1 mg tablet, Take 1 mg by mouth daily, Disp: , Rfl:     apixaban (ELIQUIS) 5 mg, Take 1 tablet (5 mg total) by mouth 2 (two) times a day, Disp: 180 tablet, Rfl: 3    CALCIUM CITRATE PO, Take 1 tablet by mouth daily, Disp: , Rfl:     furosemide (LASIX) 20 mg tablet, Take 1 tablet (20 mg total) by mouth 3 (three) times a day, Disp: 90 tablet, Rfl: 3   lisinopril (ZESTRIL) 10 mg tablet, Take 1 tablet (10 mg total) by mouth daily, Disp: 90 tablet, Rfl: 4    metolazone (ZAROXOLYN) 2 5 mg tablet, Take 1 tablet (2 5 mg total) by mouth daily, Disp: 30 tablet, Rfl: 3    metoprolol succinate (TOPROL-XL) 50 mg 24 hr tablet, Take 1 tablet (50 mg total) by mouth daily, Disp: 90 tablet, Rfl: 4    potassium chloride (K-DUR,KLOR-CON) 10 mEq tablet, Take 1 tablet (10 mEq total) by mouth 3 (three) times a day Take 1-2 tabs according to instructions, Disp: 90 tablet, Rfl: 3  Allergies   Allergen Reactions    Celecoxib Other (See Comments)    Other Other (See Comments)     Skin irritated and opened up       Review of Systems   Constitution: Negative  HENT: Negative  Eyes: Negative  Cardiovascular: Positive for leg swelling  Negative for chest pain, claudication, cyanosis, dyspnea on exertion, irregular heartbeat, near-syncope, orthopnea, palpitations, paroxysmal nocturnal dyspnea and syncope  Respiratory: Negative for cough, hemoptysis, shortness of breath, sleep disturbances due to breathing, snoring, sputum production and wheezing  Endocrine: Negative  Hematologic/Lymphatic: Negative  Skin: Negative  Musculoskeletal: Negative  Gastrointestinal: Negative  Genitourinary: Negative  Neurological: Negative  Psychiatric/Behavioral: Negative  Allergic/Immunologic: Negative  Objective:   /72   Pulse 72   Ht 5' 8" (1 727 m)   Wt 94 8 kg (209 lb)   BMI 31 78 kg/m²   Physical Exam   Constitutional: She is oriented to person, place, and time  She appears well-developed and well-nourished  HENT:   Head: Normocephalic and atraumatic  Mouth/Throat: Oropharynx is clear and moist    Eyes: Conjunctivae and EOM are normal  No scleral icterus  Neck: Normal range of motion  Neck supple  No JVD present  No tracheal deviation present  Cardiovascular: Normal rate, regular rhythm and intact distal pulses  PMI is displaced   Exam reveals no gallop and no friction rub  Murmur heard  Holosystolic murmur is present with a grade of 2/6 at the apex  Pulmonary/Chest: Effort normal  No respiratory distress  She has no wheezes  She has no rales  She exhibits no tenderness  Abdominal: Soft  Bowel sounds are normal  She exhibits no distension  There is no tenderness  Aorta not palpable    Musculoskeletal: Normal range of motion  She exhibits edema (2+ )  She exhibits no tenderness  Neurological: She is alert and oriented to person, place, and time  Skin: Skin is warm and dry  No rash noted  No erythema  No pallor  Psychiatric: She has a normal mood and affect  Her behavior is normal    Nursing note and vitals reviewed  Lab Review:   No results found for: CHOL  No results found for: HDL  No results found for: LDLCALC  No results found for: TRIG  Results Reviewed     None        Results Reviewed     None        Results Reviewed     None          Recent Cardiovascular Testing:   ECHO 12-27-19: LEFT VENTRICLE:  Size was at the upper limits of normal   Systolic function was moderately reduced  Ejection fraction was estimated in the range of 35 % to 40 %  There was moderate diffuse hypokinesis with regional variations  Wall thickness was normal    LEFT ATRIUM:The atrium was mildly dilated  MITRAL VALVE:There was mild calcification  There was moderate to severe regurgitation  TRICUSPID VALVE:There was moderate regurgitation  Estimated peak PA pressure was 45 mmHg  ECG Review:   12-12-19: Afib with controlled rate  Left axis  Otherwise WNL

## 2020-07-23 NOTE — PATIENT INSTRUCTIONS
Heart Failure, Ambulatory Care   GENERAL INFORMATION:   Heart failure  happens when your heart has become too weak to pump enough blood to your organs and tissues  Heart failure is often the result of damage or injury to your heart caused by other heart problems and high blood pressure  Heart failure is a long-term condition that tends to get worse over time  Common symptoms include the following:   · Shortness of breath     · Fatigue or lack of energy (often worsened by physical activity)     · Swelling in your ankles, legs, or abdomen    · Coughing or wheezing     · Recent weight gain or weight loss     · Confusion or poor ability to concentrate  Seek immediate care for the following symptoms:   · Squeezing, pressure, or pain in your chest that lasts longer than 5 minutes or returns    · Discomfort or pain in your back, neck, jaw, stomach, or arm     · Trouble breathing    · Nausea or vomiting    · Lightheadedness or a sudden cold sweat, especially with chest pain or trouble breathing    · Gaining 3 or more pounds (1 4 kg) in a day (or more than your healthcare provider says you should)    · Heartbeat that is fast, slow, or uneven all the time  Treatment for heart failure  Treatment for heart failure may include any of the following:  · Heart medicines  help regulate your heart rhythm, lower your blood pressure, and get rid of extra fluids  · Antiplatelets , such as aspirin, help prevent blood clots  Take your antiplatelet medicine exactly as directed  These medicines make it more likely for you to bleed or bruise  If you are told to take aspirin, do not take acetaminophen or ibuprofen instead  · Anticoagulants    are a type of blood thinner medicine that helps prevent clots  Clots can cause strokes, heart attacks, and death  These medicines may cause you to bleed or bruise more easily  ¨ Watch for bleeding from your gums or nose  Watch for blood in your urine and bowel movements   Use a soft washcloth and a soft toothbrush  If you shave, use an electric razor  Avoid activities that can cause bruising or bleeding  ¨ Tell your healthcare provider about all medicines you take because many medicines cannot be used with anticoagulants  Do not start or stop any medicines unless your healthcare provider tells you to  Tell your dentist and other healthcare providers that you take anticoagulants  Wear a bracelet or necklace that says you take this medicine  ¨ You will need regular blood tests so your healthcare provider can decide how much medicine you need  Take anticoagulants exactly as directed  Tell your healthcare provider right away if you forget to take the medicine, or if you take too much  ¨ If you take warfarin, some foods can change how your blood clots  Do not make major changes to your diet while you take warfarin  Warfarin works best when you eat about the same amount of vitamin K every day  Vitamin K is found in green leafy vegetables, broccoli, grapes, and other foods  Ask for more information about what to eat when you take warfarin  · Cardiac rehab  is a program run by specialists who will help you safely strengthen your heart  The program includes exercise, relaxation, stress management, and heart-healthy nutrition  · Oxygen  may help you breathe easier if your oxygen level is lower than normal  A CPAP machine may be used to keep your airway open while you sleep  · Surgery  can be done to implant a pacemaker in your chest to regulate your heart rhythm  Other types of surgery can open blocked heart vessels, replace a damaged heart valve, or remove scar tissue  Manage heart failure:   · Weigh yourself every morning  using the same scale, in the same spot  Do this after you use the bathroom, but before you eat or drink anything  Wear the same type of clothing  Do not wear shoes  Record your weight each day so you will notice any sudden weight gain   Swelling and weight gain are signs of fluid retention  If you are overweight, ask your healthcare provider how to lose weight safely  · Check your blood pressure and heart rate every day  Ask for more information about how to measure your blood pressure and heart rate correctly  Ask what these numbers should be for you  Check your blood sugar as directed if you have diabetes  You will have fewer symptoms if you manage other health conditions such as high blood pressure, COPD, or diabetes  · Get a flu shot every year  You should also get a pneumonia vaccine  Vaccines protect you from these infections, which can be severe for those with heart failure  · Limit the amount of liquids you drink if you retain fluid  Ask your healthcare provider how much liquid to drink each day and which liquids are best for you  Follow up with your healthcare provider as directed:  Write down your questions so you remember to ask them during your visits  CARE AGREEMENT:   You have the right to help plan your care  Learn about your health condition and how it may be treated  Discuss treatment options with your caregivers to decide what care you want to receive  You always have the right to refuse treatment  The above information is an  only  It is not intended as medical advice for individual conditions or treatments  Talk to your doctor, nurse or pharmacist before following any medical regimen to see if it is safe and effective for you  © 2014 [address] is for End User's use only and may not be sold, redistributed or otherwise used for commercial purposes  All illustrations and images included in CareNotes® are the copyrighted property of A [employer] M [employer]  or [employer]

## 2020-09-03 NOTE — ASSESSMENT & PLAN NOTE
Wt Readings from Last 3 Encounters:   09/03/20 90 7 kg (200 lb)   07/23/20 94 8 kg (209 lb)   06/25/20 103 kg (228 lb)   sHF with EF of 35% on last echo   Looks great on current regimen   Weight down to less than 200 lbs    Continue Weight-based regimen as follows:   Weigh your self every morning with similar clothing after you void     If your weight is greater than 226 take Lasix 80 mg (fourTablets) and Potassium 40 meq (four tablets)    If your weight is 221-226 take lasix 40 mg ( two tablet) and Potassium 20 Meq( two tablet)    If your weight is less than 221 take lasix 20mg ( one tablet) and Potassium 10 Meq (one tablet)      Elevate Your legs when sitting or when laying with Pillows     Wear compression stockings when standing    Now wearing non-medical grade compression stockings

## 2020-09-03 NOTE — PROGRESS NOTES
[facility]   Outpatient Note  [facility]ail [facility]  1945  [id number]  [facility], [facility] [place] [place] [id number] [phone]  Tara5 Natasha Mckeon is a 69 y o  female    Assessment and Plan:   Primary cardiomyopathy ([address] )  Likely adriamycin related  Echo shows EF to be 35-40%    Will check echo to reassess        Mitral regurgitation  Moderate per echo    Persistent atrial fibrillation  Rate controlled on BB  Now on Eliquis         Chronic systolic congestive heart failure (HCC)  Wt Readings from Last 3 Encounters:   09/03/20 90 7 kg (200 lb)   07/23/20 94 8 kg (209 lb)   06/25/20 103 kg (228 lb)   sHF with EF of 35% on last echo   Looks great on current regimen   Weight down to less than 200 lbs    Continue Weight-based regimen as follows:   Weigh your self every morning with similar clothing after you void     If your weight is greater than 226 take Lasix 80 mg (fourTablets) and Potassium 40 meq (four tablets)    If your weight is 221-226 take lasix 40 mg ( two tablet) and Potassium 20 Meq( two tablet)    If your weight is less than 221 take lasix 20mg ( one tablet) and Potassium 10 Meq (one tablet)      Elevate Your legs when sitting or when laying with Pillows     Wear compression stockings when standing    Now wearing non-medical grade compression stockings           Benign essential hypertension  Controlled on current medical regimen         Additional Plan:   Essentially she is to take Lasix 20 mg daily and return in two months     No Medication changes made or testing ordered today  Return visit will be in two months or earlier if there are problems  The patient is encouraged to call in the meantime if there are questions or concerns  Subjective: The patient is seen in the office for sHF with an EF of 35-40% on Echo in December of 2019  She has CM that is thought to be from adriamycin  Since her last visit she has been following a Lasix regimen and is feeling better  She is feeling and looking great  Her weight is now down below 200lbs  Her edema and weight are dramatically improved today  "My legs are soft and I can see my ankles again!"    Otherwise from a cardiac perspective, she is doing well without complaints of chest pain  She has no palpitations syncope or near syncope, and denies PND  She does not complain of TIA or CVA symptoms  Atrial Fibrillation   Symptoms are negative for chest pain, palpitations, shortness of breath and syncope  Past medical history includes atrial fibrillation and CHF  Congestive Heart Failure   Pertinent negatives include no chest pain, claudication, near-syncope, palpitations or shortness of breath  Social History  Social History     Tobacco Use   Smoking Status Former Smoker   Smokeless Tobacco Never Used   ,   Social History     Substance and Sexual Activity   Alcohol Use No   ,   Social History     Substance and Sexual Activity   Drug Use Not on file     History reviewed  No pertinent family history      Medical and Surgical History  Past Medical History:   Diagnosis Date    Breast [facility])     s/p right mastectomy    Dilated cardiomyopathy ([facility] [address] )     Essential (primary) hypertension     Hx of echocardiogram 06/05/2015    EF0 45 (45%) mild mitral regurg   / 2D w/CFD; EF0 43 (43%) mild MR 11/25/16    Mitral regurgitation     Non Hodgkin's lymphoma ([facility] [address] )     Nonrheumatic mitral (valve) insufficiency     Obesity     Persistent atrial fibrillation (HCC)      Past Surgical History:   Procedure Laterality Date    MASTECTOMY Right 2018    TOTAL KNEE ARTHROPLASTY Right 2013         Current Outpatient Medications:     anastrozole (ARIMIDEX) 1 mg tablet, Take 1 mg by mouth daily, Disp: , Rfl:     apixaban (ELIQUIS) 5 mg, Take 1 tablet (5 mg total) by mouth 2 (two) times a day, Disp: 180 tablet, Rfl: 3    CALCIUM CITRATE PO, Take 1 tablet by mouth daily, Disp: , Rfl:     furosemide (LASIX) 20 mg tablet, Take 1 tablet (20 mg total) by mouth 3 (three) times a day (Patient taking differently: Take 20 mg by mouth 3 (three) times a day As instructed), Disp: 90 tablet, Rfl: 3    lisinopril (ZESTRIL) 10 mg tablet, Take 1 tablet (10 mg total) by mouth daily, Disp: 90 tablet, Rfl: 4    metolazone (ZAROXOLYN) 2 5 mg tablet, Take 1 tablet (2 5 mg total) by mouth daily, Disp: 30 tablet, Rfl: 3    metoprolol succinate (TOPROL-XL) 50 mg 24 hr tablet, TAKE 1 TABLET DAILY, Disp: 90 tablet, Rfl: 3    potassium chloride (K-DUR,KLOR-CON) 10 mEq tablet, Take 1 tablet (10 mEq total) by mouth 3 (three) times a day Take 1-2 tabs according to instructions, Disp: 90 tablet, Rfl: 3  Allergies   Allergen Reactions    Celecoxib Other (See Comments)    Other Other (See Comments)     Skin irritated and opened up       Review of Systems   Constitution: Negative  HENT: Negative  Eyes: Negative  Cardiovascular: Positive for leg swelling  Negative for chest pain, claudication, cyanosis, dyspnea on exertion, irregular heartbeat, near-syncope, orthopnea, palpitations, paroxysmal nocturnal dyspnea and syncope  Respiratory: Negative for cough, hemoptysis, shortness of breath, sleep disturbances due to breathing, snoring, sputum production and wheezing  Endocrine: Negative  Hematologic/Lymphatic: Negative  Skin: Negative  Musculoskeletal: Negative  Gastrointestinal: Negative  Genitourinary: Negative  Neurological: Negative  Psychiatric/Behavioral: Negative  Allergic/Immunologic: Negative  Objective:   /80   Pulse 74   Ht 5' 8" (1 727 m)   Wt 90 7 kg (200 lb)   BMI 30 41 kg/m²   Physical Exam   Constitutional: She is oriented to person, place, and time  She appears well-developed and well-nourished  HENT:   Head: Normocephalic and atraumatic     Mouth/Throat: Oropharynx is clear and moist    Eyes: Conjunctivae and EOM are normal  No scleral icterus  Neck: Normal range of motion  Neck supple  No JVD present  No tracheal deviation present  Cardiovascular: Normal rate, regular rhythm and intact distal pulses  PMI is displaced  Exam reveals no gallop and no friction rub  Murmur heard  Holosystolic murmur is present with a grade of 3/6 at the apex  Diastolic murmur is present with a grade of 2/6 at the lower left sternal border  Pulmonary/Chest: Effort normal  No respiratory distress  She has no wheezes  She has no rales  She exhibits no tenderness  Abdominal: Soft  Bowel sounds are normal  She exhibits no distension  There is no abdominal tenderness  Aorta not palpable    Musculoskeletal: Normal range of motion  General: Edema (1+) present  No tenderness  Neurological: She is alert and oriented to person, place, and time  Skin: Skin is warm and dry  No rash noted  No erythema  No pallor  Psychiatric: She has a normal mood and affect  Her behavior is normal    Nursing note and vitals reviewed  Lab Review:   No results found for: CHOL  No results found for: HDL  No results found for: LDLCALC  No results found for: TRIG  Results Reviewed     None        Results Reviewed     None        Results Reviewed     None          Recent Cardiovascular Testing:   ECHO 12-27-19: moderately reduced LV function, EF 35-40%, mod-sev MR, mod TR     ECG Review:   12-12-19: Afib with controlled rate  Left axis  Otherwise WNL

## 2020-11-11 PROBLEM — E87.1 ACUTE HYPONATREMIA: Status: ACTIVE | Noted: 2020-01-01

## 2020-11-11 PROBLEM — N18.31 ACUTE RENAL FAILURE SUPERIMPOSED ON STAGE 3A CHRONIC KIDNEY DISEASE (HCC): Status: ACTIVE | Noted: 2020-01-01

## 2020-11-11 PROBLEM — J12.82 PNEUMONIA DUE TO COVID-19 VIRUS: Status: ACTIVE | Noted: 2020-01-01

## 2020-11-11 PROBLEM — U07.1 PNEUMONIA DUE TO COVID-19 VIRUS: Status: ACTIVE | Noted: 2020-01-01

## 2020-11-11 PROBLEM — N17.9 ACUTE RENAL FAILURE SUPERIMPOSED ON STAGE 3A CHRONIC KIDNEY DISEASE (HCC): Status: ACTIVE | Noted: 2020-01-01

## 2020-11-11 PROBLEM — J96.02 ACUTE RESPIRATORY FAILURE WITH HYPOXIA AND HYPERCAPNIA (HCC): Status: ACTIVE | Noted: 2020-01-01

## 2020-11-11 PROBLEM — J96.01 ACUTE RESPIRATORY FAILURE WITH HYPOXIA AND HYPERCAPNIA (HCC): Status: ACTIVE | Noted: 2020-01-01

## 2020-11-15 PROBLEM — Z85.3 HISTORY OF BREAST CANCER: Status: ACTIVE | Noted: 2020-01-01

## 2020-12-31 PROBLEM — L03.317 CELLULITIS OF BUTTOCK: Status: ACTIVE | Noted: 2020-01-01

## 2020-12-31 PROBLEM — E83.52 HYPERCALCEMIA: Status: ACTIVE | Noted: 2020-01-01

## 2020-12-31 PROBLEM — J96.11 CHRONIC RESPIRATORY FAILURE WITH HYPOXIA, ON HOME OXYGEN THERAPY (HCC): Status: ACTIVE | Noted: 2020-01-01

## 2020-12-31 PROBLEM — E87.5 HYPERKALEMIA: Status: ACTIVE | Noted: 2020-01-01

## 2020-12-31 PROBLEM — I07.1 TRICUSPID VALVE INSUFFICIENCY: Status: ACTIVE | Noted: 2020-01-01

## 2020-12-31 PROBLEM — I27.20 PULMONARY HYPERTENSION (HCC): Status: ACTIVE | Noted: 2020-01-01

## 2020-12-31 PROBLEM — I34.0 MITRAL VALVE INSUFFICIENCY: Status: ACTIVE | Noted: 2020-01-01

## 2020-12-31 PROBLEM — R19.7 DIARRHEA: Status: ACTIVE | Noted: 2020-01-01

## 2020-12-31 PROBLEM — Z86.16 HISTORY OF 2019 NOVEL CORONAVIRUS DISEASE (COVID-19): Status: ACTIVE | Noted: 2020-01-01

## 2020-12-31 PROBLEM — E80.6 HYPERBILIRUBINEMIA: Status: ACTIVE | Noted: 2020-01-01

## 2020-12-31 PROBLEM — Z99.81 CHRONIC RESPIRATORY FAILURE WITH HYPOXIA, ON HOME OXYGEN THERAPY (HCC): Status: ACTIVE | Noted: 2020-01-01

## 2020-12-31 PROBLEM — R74.8 ELEVATED ALKALINE PHOSPHATASE LEVEL: Status: ACTIVE | Noted: 2020-01-01

## 2020-12-31 PROBLEM — N17.9 ACUTE KIDNEY INJURY (HCC): Status: ACTIVE | Noted: 2020-01-01

## 2020-12-31 PROBLEM — A41.9 SEPSIS (HCC): Status: ACTIVE | Noted: 2020-01-01

## 2020-12-31 NOTE — ED NOTES
Dr Valla Denver at bedside     [facility]  12/31/20 1993

## 2020-12-31 NOTE — ED NOTES
Cherry Crew at bedside with ultrasound machine for IV insertion     Everette Gamboa RN  12/31/20 2636

## 2020-12-31 NOTE — RESPIRATORY THERAPY NOTE
Albuterol nebulizer on hold due to pt having line placed by surgery  Will give treatment after procedure is completed  RN aware

## 2020-12-31 NOTE — PROCEDURES
Assistants: n/s     Anesthesia: Meds; anesthesia local: 1% lidocaine     Procedure:  Ultrasound-guided right internal jugular triple lumen venous catheter     Blood Loss: 69JC     Complications: none     Specimen: none     Description of Procedure:     General surgery was consulted to place a a triple-lumen venous central line due to the fact the patient is septic and in need of IV fluids and IV antibiotics and has poor venous access  The procedure self including all associated risks and benefits were discussed the patient great length  Patient verbalized understands risks is willing proceed, consent was signed  Of note the patient is currently on Eliquis, she understands the potential bleeding risks  At bedside the patient was placed in  Trendelenburg position and ultrasound probe was used on the right side of the neck which clearly visualized the right internal jugular  This area was marked with a surgical marking pen  The patient was prepped and draped in usual sterile fashion  A timeout was performed verifying the correct patient, procedure, position, and site  Under ultrasonic guidance a finder needle was inserted the right internal jugular vein and dark venous blood was aspirated  A guidewire was inserted and the needle removed  The guide wire was confirmed by ultrasound to be within the internal jugular vein  A 11 blade scalpel was then used to make a small skin incision above the wire  A dilator was then placed to dilate the tract  The central venous catheter triple-lumen was then placed using Seldinger technique over the guidewire  All ports were then checked to ensure the aspirate and flush appropriately  The triple lumen catheter was then sewn to the skin using nylon suture  A dry sterile dressing was then placed  Patient tolerated procedure well  A chest x-ray was then ordered to verify correct position and rule out possible pneumothorax

## 2020-12-31 NOTE — ED PROCEDURE NOTE
Procedure  US Guided Peripheral IV    Date/Time: 12/31/2020 3:24 PM  Performed by: Sarbjit Will PA-C  Authorized by: Sarbjit Will PA-C     Patient location:  Bedside  Performed by:  NP/PA  Other Assisting Provider: No    Indications:     Indications: difficulty obtaining IV access      Image availability:  Not saved  Procedure details:     Patient evaluated for contraindications to access (i e  fistula, thrombosis, etc): Yes      Standard clean technique used for ultrasound access: Yes      Location:  Left arm    Catheter size:  20 gauge    Number of attempts:  2    Successful placement: yes    Post-procedure details:     Post-procedure:  Dressing applied    Assessment: free fluid flow and no signs of infiltration      Post-procedure complications: none      Patient tolerance of procedure:   Tolerated well, no immediate complications                     Sarbjit Will PA-C  12/31/20 1525

## 2020-12-31 NOTE — ASSESSMENT & PLAN NOTE
· Admit to med/surg level of care  · Sepsis was present on admission secondary to cellulitis of the right buttock  · SIRS criteria was met with a fever, leukocytosis, tachycardia, and tachypnea  · The patient had COVID-19 pneumonia in November 2020 (Diagnosed 11/11/2020)  · I discussed the case with Infectious Disease  · The patient does not need airborne COVID precautions at this time, because it is highly unlikely the patient has been re-infected with COVID-19 this quickly after her previous infection  · Utilize broad-spectrum antibiotics with IV vancomycin and IV cefepime  · Check blood cultures x 2 sets  · Check a MRSA nasal screen  · Check stool cultures including a Clostridium difficile culture, a Rotavirus stool antigen test, a stool culture for enteric bacterial pathogens, and stool for fecal leukocytes  · Check a lactic acid level  · Utilize NSS IV fluids at 100 ml/hr  · Was not given the initial 30 ml/kg NSS IV fluid bolus per the sepsis protocol with the patient having known chronic systolic congestive heart failure  · The patient required central line placement (right internal jugular triple-lumen catheter placement) by Dr Dallin Hancock (General Surgery) secondary to lack of any peripheral IV access    · Consult General Surgery to evaluate the cellulitis of the right buttock

## 2020-12-31 NOTE — ASSESSMENT & PLAN NOTE
· Sepsis was present on admission secondary to cellulitis of the right buttock  · SIRS criteria was met with a fever, leukocytosis, tachycardia, and tachypnea  · The patient had COVID-19 pneumonia in November 2020 (Diagnosed 11/11/2020)  · I discussed the case with Infectious Disease  · The patient does not need airborne COVID precautions at this time, because it is highly unlikely the patient has been re-infected with COVID-19 this quickly after her previous infection  · Utilize broad-spectrum antibiotics with IV vancomycin and IV cefepime  · Check blood cultures x 2 sets  · Check a MRSA nasal screen  · Check stool cultures including a Clostridium difficile culture, a Rotavirus stool antigen test, a stool culture for enteric bacterial pathogens, and stool for fecal leukocytes  · Check a lactic acid level  · Utilize NSS IV fluids at 100 ml/hr  · Was not given the initial 30 ml/kg NSS IV fluid bolus per the sepsis protocol with the patient having known chronic systolic congestive heart failure  · The patient required central line placement (right internal jugular triple-lumen catheter placement) by Dr Andrew Yen (General Surgery) secondary to lack of any peripheral IV access    · Consult General Surgery to evaluate the cellulitis of the right buttock

## 2020-12-31 NOTE — ASSESSMENT & PLAN NOTE
Acute kidney injury was present admission and secondary to prerenal azotemia in the setting of sepsis and diarrhea  Baseline serum creatinine of 1 0-1 2 mg/dl  Avoid all nephrotoxic agents  Check a urine sodium level  Check a urine protein/creatinine level  Check the patient post-void residual amounts every shift with bladder scanning, and follow the urinary retention protocol  Consult Nephrology  Serial laboratory testing to monitor the patient's renal function and electrolytes    Results from last 7 days   Lab Units 12/31/20  1207   SODIUM mmol/L 132*   POTASSIUM mmol/L 5 6*   CHLORIDE mmol/L 98*   CO2 mmol/L 22   BUN mg/dL 62*   CREATININE mg/dL 2 64*   CALCIUM mg/dL 9 3

## 2020-12-31 NOTE — ASSESSMENT & PLAN NOTE
· On chronic home supplemental oxygen therapy at 2 lpm QHS and anytime while sleeping for a previous diagnosis of bradycardia and hypoxia during sleep  · Outpatient sleep study to check for obstructive sleep apnea  · Outpatient Pulmonology evaluation

## 2020-12-31 NOTE — ASSESSMENT & PLAN NOTE
Wt Readings from Last 3 Encounters:   12/31/20 79 8 kg (176 lb)   11/25/20 80 kg (176 lb 5 9 oz)   09/03/20 90 7 kg (200 lb)     · Currently appears hypovolemic  · EF was measured at 35%-40% on a transthoracic echocardiogram completed on 12/27/2019  · Hold all diuretics in the setting of acute kidney injury  · Hold lisinopril setting of acute kidney injury  · Continue toprol XL at a decreased dose of 25 mg PO Qdaily with blood pressure hold parameters and heart rate hold parameters  · Daily weights  · Strict intake/output measurements  · Monitor her volume status closely  · Outpatient follow-up with Cardiology

## 2020-12-31 NOTE — ED NOTES
Supervisor, Delisa notified for IV access  Multiple attempts were made by ED staff without success        Johnnye Prader Page, RN  12/31/20 7745

## 2020-12-31 NOTE — ED PROVIDER NOTES
History  Chief Complaint   Patient presents with    Abscess     pt reports abscess on right buttocks started approx saturday - pt also reports sob     Patient presents via EMS for evaluation of pain in her right buttock that her visiting nurse assessed as a possible abscess  She was diagnosed with COVID in November became very weak/frail from it  She spent time in rehab for physical therapy because she was requiring a walker to ambulate  She was improved and went home on December 21st but feels over the past 3 days that she has worsened  During this time, she has had diarrhea and has had difficulty keeping herself clean, possibly leading to the buttock abscess  The right buttock is very tender, particularly when she is sitting or lying on it  She is able to have a bowel movement without increased pain  She has not noted any blood in her stool  She has a h/o atrial fibrillation for which she takes Eliquis; however, she did not take any of her medicines today  She has not otherwise missed doses of her medicines  No recent travel or sick contacts  Denies f/c, HA, CP, SOB, abdominal pain, n/v/d  12 system ROS o/w negative  History provided by:  Patient and medical records  Abscess  Location:  Pelvis  Pelvic abscess location:  R buttock  Abscess quality: induration, painful, redness and warmth    Abscess quality: not draining, no fluctuance and not weeping    Red streaking: no    Duration: 2-3 days    Progression:  Worsening  Pain details:     Quality:  Dull    Severity:  Moderate    Duration:  2 days    Timing:  Constant    Progression:  Worsening  Chronicity:  New  Context: not injected drug use and not insect bite/sting    Relieved by:  None tried  Worsened by:  Draining/squeezing  Ineffective treatments:  None tried  Associated symptoms: no anorexia, no fatigue, no fever, no headaches, no nausea and no vomiting        Prior to Admission Medications   Prescriptions Last Dose Informant Patient Reported? Taking? CALCIUM CITRATE PO   Yes No   Sig: Take 1 tablet by mouth daily   anastrozole (ARIMIDEX) 1 mg tablet   Yes No   Sig: Take 1 mg by mouth daily   apixaban (ELIQUIS) 5 mg   No No   Sig: Take 1 tablet (5 mg total) by mouth 2 (two) times a day   atorvastatin (LIPITOR) 40 mg tablet   No No   Sig: Take 1 tablet (40 mg total) by mouth daily at bedtime   furosemide (LASIX) 20 mg tablet   No No   Sig: Take 1 tablet (20 mg total) by mouth 2 (two) times a day   lisinopril (ZESTRIL) 5 mg tablet   No No   Sig: Take 1 tablet (5 mg total) by mouth daily   metolazone (ZAROXOLYN) 2 5 mg tablet   No No   Sig: Take 1 tablet (2 5 mg total) by mouth daily   metoprolol succinate (TOPROL-XL) 50 mg 24 hr tablet   No No   Sig: TAKE 1 TABLET DAILY   potassium chloride (K-DUR,KLOR-CON) 10 mEq tablet   No No   Sig: Take 1 tablet (10 mEq total) by mouth 3 (three) times a day Take 1-2 tabs according to instructions      Facility-Administered Medications: None       Past Medical History:   Diagnosis Date    A-fib ([place] [address] )     Breast cancer (HCC)     s/p right mastectomy    [facility]     Coronary artery disease     D[facility]     Essential (primary) hypertension     Hx of echocardiogram 06/05/2015    EF0 45 (45%) mild mitral regurg   / 2D w/CFD; EF0 43 (43%) mild MR 11/25/16    Hypertension     Mitral regurgitation     Non Hodgkin's lymphoma [facility]     Nonrheumatic mitral (valve) insufficiency     Obesity     Persistent atrial fibrillation [facility]     Renal disorder        Past Surgical History:   Procedure Laterality Date    MASTECTOMY Right 2018    TOTAL KNEE ARTHROPLASTY Right 2013       History reviewed  No pertinent family history  I have reviewed and agree with the history as documented      E-Cigarette/Vaping    E-Cigarette Use Never User      E-Cigarette/Vaping Substances    Nicotine No     THC No     CBD No     Flavoring No     Other No     Unknown No Social History     Tobacco Use    Smoking status: Former Smoker    Smokeless tobacco: Never Used   Substance Use Topics    Alcohol use: Never     Frequency: Never    Drug use: Never       Review of Systems   Constitutional: Negative for chills, diaphoresis, fatigue and fever  HENT: Negative for rhinorrhea, sore throat and trouble swallowing  Respiratory: Negative for cough, chest tightness, shortness of breath and wheezing  Cardiovascular: Negative for chest pain, palpitations and leg swelling  Gastrointestinal: Negative for abdominal distention, abdominal pain, anorexia, constipation, diarrhea, nausea and vomiting  Genitourinary: Negative for difficulty urinating, dysuria, flank pain, frequency, hematuria and urgency  Musculoskeletal: Negative for arthralgias, back pain, myalgias, neck pain and neck stiffness  Skin: Positive for color change (Right buttock )  Negative for pallor and rash  Neurological: Negative for dizziness, weakness, light-headedness and headaches  Hematological: Negative for adenopathy  Psychiatric/Behavioral: Negative for confusion  The patient is not nervous/anxious  All other systems reviewed and are negative  Physical Exam  Physical Exam  Vitals signs reviewed  Constitutional:       General: She is not in acute distress  Appearance: Normal appearance  She is well-developed  She is not ill-appearing, toxic-appearing or diaphoretic  HENT:      Head: Normocephalic  Mouth/Throat:      Mouth: Mucous membranes are moist       Pharynx: Oropharynx is clear  No oropharyngeal exudate  Eyes:      General: No scleral icterus  Conjunctiva/sclera: Conjunctivae normal       Pupils: Pupils are equal, round, and reactive to light  Neck:      Musculoskeletal: Normal range of motion and neck supple  Cardiovascular:      Rate and Rhythm: Normal rate and regular rhythm  Heart sounds: No murmur     Pulmonary:      Effort: Pulmonary effort is normal       Breath sounds: Normal breath sounds  Abdominal:      General: Bowel sounds are normal  There is no distension  Palpations: Abdomen is soft  Tenderness: There is no abdominal tenderness  Musculoskeletal: Normal range of motion  General: No tenderness  Right lower leg: No edema  Left lower leg: No edema  Lymphadenopathy:      Cervical: No cervical adenopathy  Skin:     General: Skin is warm and dry  Coloration: Skin is not pale  Findings: Erythema (Right buttock) present  No rash  Comments: Large indurated area right mid buttock without palpable extension to the perianal or presacral areas  No palpable fluctuance  Area is exquisitely tender  Neurological:      Mental Status: She is alert and oriented to person, place, and time  Cranial Nerves: No cranial nerve deficit  Motor: No abnormal muscle tone  Deep Tendon Reflexes: Reflexes are normal and symmetric  Psychiatric:         Behavior: Behavior normal          Thought Content:  Thought content normal          Vital Signs  ED Triage Vitals [12/31/20 1103]   Temperature Pulse Respirations Blood Pressure SpO2   98 5 °F (36 9 °C) (!) 106 22 (!) 121/103 95 %      Temp Source Heart Rate Source Patient Position - Orthostatic VS BP Location FiO2 (%)   Temporal Monitor Sitting Right arm --      Pain Score       No Pain           Vitals:    12/31/20 1245 12/31/20 1315 12/31/20 1400 12/31/20 1430   BP: 92/67 118/90  117/57   Pulse: (!) 106 105 (!) 113 (!) 116   Patient Position - Orthostatic VS: Sitting Sitting  Sitting         Visual Acuity      ED Medications  Medications   sodium chloride 0 9 % bolus 1,000 mL (0 mL Intravenous Hold 12/31/20 1315)   cefepime (MAXIPIME) IVPB (premix in dextrose) 2,000 mg 50 mL (0 mg Intravenous Hold 12/31/20 1430)       Diagnostic Studies  Results Reviewed     Procedure Component Value Units Date/Time    NT-BNP PRO [[id number]]  (Abnormal) Collected: 12/31/20 1207    Lab Status: Final result Specimen: Blood from Arm, Left Updated: 12/31/20 1234     NT-proBNP 6,758 pg/mL     Comprehensive metabolic panel [[id number]]  (Abnormal) Collected: 12/31/20 1207    Lab Status: Final result Specimen: Blood from Arm, Left Updated: 12/31/20 1232     Sodium 132 mmol/L      Potassium 5 6 mmol/L      Chloride 98 mmol/L      CO2 22 mmol/L      ANION GAP 12 mmol/L      BUN 62 mg/dL      Creatinine 2 64 mg/dL      Glucose 157 mg/dL      Calcium 9 3 mg/dL      Corrected Calcium 10 5 mg/dL      AST 20 U/L      ALT 18 U/L      Alkaline Phosphatase 142 U/L      Total Protein 6 7 g/dL      Albumin 2 5 g/dL      Total Bilirubin 1 30 mg/dL      eGFR 17 ml/min/1 73sq m     Narrative:      [place] guidelines for Chronic Kidney Disease (CKD):     Stage 1 with normal or high GFR (GFR > 90 mL/min/1 73 square meters)    Stage 2 Mild CKD (GFR = 60-89 mL/min/1 73 square meters)    Stage 3A Moderate CKD (GFR = 45-59 mL/min/1 73 square meters)    Stage 3B Moderate CKD (GFR = 30-44 mL/min/1 73 square meters)    Stage 4 Severe CKD (GFR = 15-29 mL/min/1 73 square meters)    Stage 5 End Stage CKD (GFR <15 mL/min/1 73 square meters)  Note: GFR calculation is accurate only with a steady state creatinine    Troponin I [[id number]]  (Normal) Collected: 12/31/20 1207    Lab Status: Final result Specimen: Blood from Arm, Left Updated: 12/31/20 1230     Troponin I <0 02 ng/mL     D-dimer, quantitative [[id number]]  (Abnormal) Collected: 12/31/20 1207    Lab Status: Final result Specimen: Blood from Arm, Left Updated: 12/31/20 1224     D-Dimer, Quant 2 11 ug/ml FEU     CBC and differential [[id number]]  (Abnormal) Collected: 12/31/20 1207    Lab Status: Final result Specimen: Blood from Arm, Left Updated: 12/31/20 1214     WBC 21 23 Thousand/uL      RBC 3 57 Million/uL      Hemoglobin 11 0 g/dL      Hematocrit 34 0 %      MCV 95 fL      MCH 30 8 pg      MCHC 32 4 g/dL      RDW 15 7 % MPV 10 2 fL      Platelets 608 Thousands/uL      nRBC 0 /100 WBCs      Neutrophils Relative 84 %      Immat GRANS % 2 %      Lymphocytes Relative 5 %      Monocytes Relative 9 %      Eosinophils Relative 0 %      Basophils Relative 0 %      Neutrophils Absolute 17 69 Thousands/µL      Immature Grans Absolute >0 50 Thousand/uL      Lymphocytes Absolute 1 03 Thousands/µL      Monocytes Absolute 1 95 Thousand/µL      Eosinophils Absolute 0 00 Thousand/µL      Basophils Absolute 0 05 Thousands/µL                  CT pelvis wo contrast   Final Result by Caity Navarro DO (12/31 6888)      Subcutaneous edema of the right gluteal region, consistent with cellulitis in the appropriate clinical setting  No focal fluid collection to suggest the presence of abscess  No soft tissue gas identified  Workstation performed: [id number]         XR chest 1 view portable   Final Result by Roseanne Vega MD (12/31 6599)      1  Emphysema  2   Scattered areas of subsegmental atelectasis or fibrosis  3   Patchy opacities in both lungs, with improvement since 11/18/2020  No new abnormality in the chest                   Workstation performed: [id number]         CT chest wo contrast    (Results Pending)              Procedures  ECG 12 Lead Documentation Only    Date/Time: 12/31/2020 11:15 AM  Performed by: Fina Jernigan DO  Authorized by: Fina Jernigan DO     Indications / Diagnosis:  Dyspnea  ECG reviewed by me, the ED Provider: yes    Patient location:  ED  Quality:     Tracing quality:  Limited by artifact  Rate:     ECG rate:  101    ECG rate assessment: tachycardic    Rhythm:     Rhythm: atrial fibrillation    Ectopy:     Ectopy: none    Conduction:     Conduction: normal    ST segments:     ST segments:  Normal  T waves:     T waves: normal    CriticalCare Time  Performed by: Fina Jernigan DO  Authorized by:  Fina Jernigan DO     Critical care provider statement:     Critical care time (minutes):  30 Critical care time was exclusive of:  Separately billable procedures and treating other patients and teaching time    Critical care was necessary to treat or prevent imminent or life-threatening deterioration of the following conditions:  Renal failure and sepsis    Critical care was time spent personally by me on the following activities:  Obtaining history from patient or surrogate, development of treatment plan with patient or surrogate, discussions with consultants, evaluation of patient's response to treatment, examination of patient, ordering and performing treatments and interventions, ordering and review of laboratory studies, ordering and review of radiographic studies, re-evaluation of patient's condition and review of old charts    I assumed direction of critical care for this patient from another provider in my specialty: no               ED Course  ED Course as of Dec 31 1514   Thu Dec 31, 2020   1233 Baseline under 1 2  Will hydrate  Creatinine(!): 2 64   1234 Baseline 45  Will defer CTA until GFR is improved  eGFR: 17   1243 Baseline [id number]  NT-proBNP(!): 5,959   1334 Results reviewed with patient  Feels the same  All questions answered to the patient's satisfaction  Recommend admission  1335 Baseline 14 5-15  7  No clinical evidence of acute blood loss  Hemoglobin(!): 11 0   1405 Awaiting PT/OT consult for possible in-pt rehab placement                          PERC Rule for PE      Most Recent Value   PERC Rule for PE   Age >=50  1 Filed at: 12/31/2020 1231   HR >=100  1 Filed at: 12/31/2020 1231   O2 Sat on room air < 95%  0 Filed at: 12/31/2020 1231   History of PE or DVT  0 Filed at: 12/31/2020 1231   Recent trauma or surgery  0 Filed at: 12/31/2020 1231   Hemoptysis  0 Filed at: 12/31/2020 1231   Exogenous estrogen  0 Filed at: 12/31/2020 1231   Unilateral leg swelling  0 Filed at: 12/31/2020 1231   [address] for PE Results  2 Filed at: 12/31/2020 1231          Initial Sepsis Screening     Row Name 12/31/20 4935                Is the patient's history suggestive of a new or worsening infection? (!) Yes (Proceed)  -AC        Suspected source of infection  soft tissue  -AC        Are two or more of the following signs & symptoms of infection both present and new to the patient?  --        Indicate SIRS criteria  Tachycardia > 90 bpm;Tachypnea > 20 resp per min;Leukocytosis (WBC > 17521 IJL)  -AC        If the answer is yes to both questions, suspicion of sepsis is present  --        If severe sepsis is present AND tissue hypoperfusion perists in the hour after fluid resuscitation or lactate > 4, the patient meets criteria for SEPTIC SHOCK  --        Are any of the following organ dysfunction criteria present within 6 hours of suspected infection and SIRS criteria that are NOT considered to be chronic conditions? No  -AC        Organ dysfunction  --        Date of presentation of severe sepsis  --        Time of presentation of severe sepsis  --        Tissue hypoperfusion persists in the hour after crystalloid fluid administration, evidenced, by either:  --        Was hypotension present within one hour of the conclusion of crystalloid fluid administration?  --        Date of presentation of septic shock  --        Time of presentation of septic shock  --          User Key  (r) = Recorded By, (t) = Taken By, (c) = Cosigned By    [address] Name Provider Type    [address], DO Physician          SBIRT 22yo+      Most Recent Value   SBIRT (23 yo +)   In order to provide better care to our patients, we are screening all of our patients for alcohol and drug use  Would it be okay to ask you these screening questions? Yes Filed at: 12/31/2020 1101   Initial Alcohol Screen: US AUDIT-C    1  How often do you have a drink containing alcohol? 1 Filed at: 12/31/2020 1107   2  How many drinks containing alcohol do you have on a typical day you are drinking?    1 Filed at: 12/31/2020 1109 3a  Male UNDER 65: How often do you have five or more drinks on one occasion? 0 Filed at: 12/31/2020 1105   3b  FEMALE Any Age, or MALE 65+: How often do you have 4 or more drinks on one occassion? 0 Filed at: 12/31/2020 1105   Audit-C Score  2 Filed at: 12/31/2020 1105   ILENE: How many times in the past year have you    Used an illegal drug or used a prescription medication for non-medical reasons? Never Filed at: 12/31/2020 1105                    MDM  Number of Diagnoses or Management Options  Diagnosis management comments: DDx:  Right buttock pain - abscess, doubt necrotizing fasciitis, perirectal abscess, sepsis or osteomyelitis  A/P: Will check CT pelvis, inflammatory workup, treat symptoms, reevaluate for further work up and disposition         Amount and/or Complexity of Data Reviewed  Clinical lab tests: ordered and reviewed  Tests in the radiology section of CPT®: ordered and reviewed  Obtain history from someone other than the patient: yes (EMS)  Review and summarize past medical records: yes        Disposition  Final diagnoses:   Cellulitis of right buttock   Acute renal failure (ARF) ([facility] )   Anemia   Dyspnea     Time reflects when diagnosis was documented in both MDM as applicable and the Disposition within this note     Time User Action Codes Description Comment    12/31/2020  2:02  [facility] [B21 866] Cellulitis of right buttock     12/31/2020  2:02 PM Traci Leija Add [N17 9] Acute renal failure (ARF) ([facility] )     12/31/2020  2:02 PM Elsi Shaffer Add [D64 9] Anemia     12/31/2020  2:02 PM Elsi Shaffer Add [R06 00] Dyspnea     12/31/2020  2:02  [facility] [R09 02] Hypoxia     12/31/2020  2:38 PM Lili Waters, [address] [place] [address] [R09 02] Hypoxia       ED Disposition     ED Disposition Condition Date/Time Comment    Admit Stable u Dec 31, 2020  2:02 PM Case was discussed with Dr Philis Gottron and the patient's admission status was agreed to be Admission Status: inpatient status to the service of Dr Steen Carrel  Follow-up Information    None         Patient's Medications   Discharge Prescriptions    No medications on file     No discharge procedures on file      PDMP Review     None          ED Provider  Electronically Signed by           Deborah Cortes DO  12/31/20 0497

## 2021-01-01 ENCOUNTER — APPOINTMENT (INPATIENT)
Dept: RADIOLOGY | Facility: HOSPITAL | Age: 76
DRG: 853 | End: 2021-01-01
Payer: COMMERCIAL

## 2021-01-01 ENCOUNTER — ANESTHESIA EVENT (INPATIENT)
Dept: PERIOP | Facility: HOSPITAL | Age: 76
DRG: 853 | End: 2021-01-01
Payer: COMMERCIAL

## 2021-01-01 ENCOUNTER — ANESTHESIA (INPATIENT)
Dept: PERIOP | Facility: HOSPITAL | Age: 76
DRG: 853 | End: 2021-01-01
Payer: COMMERCIAL

## 2021-01-01 ENCOUNTER — HOSPITAL ENCOUNTER (INPATIENT)
Facility: HOSPITAL | Age: 76
LOS: 3 days | DRG: 853 | End: 2021-01-04
Attending: ANESTHESIOLOGY | Admitting: ANESTHESIOLOGY
Payer: COMMERCIAL

## 2021-01-01 ENCOUNTER — APPOINTMENT (INPATIENT)
Dept: NON INVASIVE DIAGNOSTICS | Facility: HOSPITAL | Age: 76
DRG: 853 | End: 2021-01-01
Payer: COMMERCIAL

## 2021-01-01 VITALS
RESPIRATION RATE: 30 BRPM | BODY MASS INDEX: 30.52 KG/M2 | OXYGEN SATURATION: 90 % | DIASTOLIC BLOOD PRESSURE: 53 MMHG | HEIGHT: 70 IN | HEART RATE: 114 BPM | TEMPERATURE: 98.6 F | WEIGHT: 213.19 LBS | SYSTOLIC BLOOD PRESSURE: 91 MMHG

## 2021-01-01 VITALS
HEART RATE: 113 BPM | BODY MASS INDEX: 25.2 KG/M2 | DIASTOLIC BLOOD PRESSURE: 48 MMHG | SYSTOLIC BLOOD PRESSURE: 75 MMHG | TEMPERATURE: 97.5 F | OXYGEN SATURATION: 95 % | HEIGHT: 70 IN | RESPIRATION RATE: 20 BRPM | WEIGHT: 176 LBS

## 2021-01-01 VITALS — HEART RATE: 117 BPM

## 2021-01-01 VITALS — HEART RATE: 132 BPM

## 2021-01-01 DIAGNOSIS — L03.317 CELLULITIS OF RIGHT BUTTOCK: ICD-10-CM

## 2021-01-01 DIAGNOSIS — T82.9XXA CENTRAL LINE COMPLICATION, INITIAL ENCOUNTER: Primary | ICD-10-CM

## 2021-01-01 DIAGNOSIS — N17.9 ACUTE KIDNEY INJURY (HCC): ICD-10-CM

## 2021-01-01 DIAGNOSIS — R65.21 SEPTIC SHOCK (HCC): ICD-10-CM

## 2021-01-01 DIAGNOSIS — J96.02 ACUTE RESPIRATORY FAILURE WITH HYPOXIA AND HYPERCAPNIA (HCC): ICD-10-CM

## 2021-01-01 DIAGNOSIS — J96.01 ACUTE RESPIRATORY FAILURE WITH HYPOXIA AND HYPERCAPNIA (HCC): ICD-10-CM

## 2021-01-01 DIAGNOSIS — A41.9 SEPTIC SHOCK (HCC): ICD-10-CM

## 2021-01-01 LAB
ABO GROUP BLD: NORMAL
ALBUMIN SERPL BCP-MCNC: 2.2 G/DL (ref 3.5–5)
ALBUMIN SERPL BCP-MCNC: 2.2 G/DL (ref 3.5–5)
ALBUMIN SERPL BCP-MCNC: 2.4 G/DL (ref 3.5–5)
ALBUMIN SERPL BCP-MCNC: 2.5 G/DL (ref 3.5–5)
ALBUMIN SERPL ELPH-MCNC: 2.58 G/DL (ref 3.5–5)
ALBUMIN SERPL ELPH-MCNC: 53.7 % (ref 52–65)
ALP SERPL-CCNC: 100 U/L (ref 46–116)
ALP SERPL-CCNC: 125 U/L (ref 46–116)
ALP SERPL-CCNC: 132 U/L (ref 46–116)
ALP SERPL-CCNC: 133 U/L (ref 46–116)
ALPHA1 GLOB SERPL ELPH-MCNC: 0.59 G/DL (ref 0.1–0.4)
ALPHA1 GLOB SERPL ELPH-MCNC: 12.2 % (ref 2.5–5)
ALPHA2 GLOB SERPL ELPH-MCNC: 0.69 G/DL (ref 0.4–1.2)
ALPHA2 GLOB SERPL ELPH-MCNC: 14.3 % (ref 7–13)
ALT SERPL W P-5'-P-CCNC: 15 U/L (ref 12–78)
ALT SERPL W P-5'-P-CCNC: 28 U/L (ref 12–78)
ALT SERPL W P-5'-P-CCNC: 709 U/L (ref 12–78)
ALT SERPL W P-5'-P-CCNC: 928 U/L (ref 12–78)
ANION GAP SERPL CALCULATED.3IONS-SCNC: 10 MMOL/L (ref 4–13)
ANION GAP SERPL CALCULATED.3IONS-SCNC: 11 MMOL/L (ref 4–13)
ANION GAP SERPL CALCULATED.3IONS-SCNC: 12 MMOL/L (ref 4–13)
ANION GAP SERPL CALCULATED.3IONS-SCNC: 13 MMOL/L (ref 4–13)
ANION GAP SERPL CALCULATED.3IONS-SCNC: 8 MMOL/L (ref 4–13)
ANION GAP SERPL CALCULATED.3IONS-SCNC: 9 MMOL/L (ref 4–13)
APTT PPP: 39 SECONDS (ref 23–37)
APTT PPP: 42 SECONDS (ref 23–37)
APTT PPP: 51 SECONDS (ref 23–37)
APTT PPP: 52 SECONDS (ref 23–37)
APTT PPP: 58 SECONDS (ref 23–37)
APTT PPP: 60 SECONDS (ref 23–37)
APTT PPP: 79 SECONDS (ref 23–37)
APTT PPP: 84 SECONDS (ref 23–37)
APTT PPP: 89 SECONDS (ref 23–37)
ARTERIAL PATENCY WRIST A: YES
AST SERPL W P-5'-P-CCNC: 2025 U/L (ref 5–45)
AST SERPL W P-5'-P-CCNC: 21 U/L (ref 5–45)
AST SERPL W P-5'-P-CCNC: 50 U/L (ref 5–45)
AST SERPL W P-5'-P-CCNC: 793 U/L (ref 5–45)
ATRIAL RATE: 121 BPM
ATRIAL RATE: 139 BPM
ATRIAL RATE: 139 BPM
BACTERIA SPEC ANAEROBE CULT: NORMAL
BACTERIA TISS AEROBE CULT: ABNORMAL
BACTERIA UR QL AUTO: ABNORMAL /HPF
BASE EX.OXY STD BLDV CALC-SCNC: 46.5 % (ref 60–80)
BASE EX.OXY STD BLDV CALC-SCNC: 50.9 % (ref 60–80)
BASE EX.OXY STD BLDV CALC-SCNC: 61.7 % (ref 60–80)
BASE EX.OXY STD BLDV CALC-SCNC: 62 % (ref 60–80)
BASE EXCESS BLDA CALC-SCNC: -10.3 MMOL/L
BASE EXCESS BLDA CALC-SCNC: -10.6 MMOL/L
BASE EXCESS BLDA CALC-SCNC: -10.8 MMOL/L
BASE EXCESS BLDA CALC-SCNC: -11 MMOL/L
BASE EXCESS BLDA CALC-SCNC: -2.4 MMOL/L
BASE EXCESS BLDA CALC-SCNC: -3.7 MMOL/L
BASE EXCESS BLDA CALC-SCNC: -4 MMOL/L (ref -2–3)
BASE EXCESS BLDA CALC-SCNC: -4.7 MMOL/L
BASE EXCESS BLDA CALC-SCNC: -4.9 MMOL/L
BASE EXCESS BLDA CALC-SCNC: -5.3 MMOL/L
BASE EXCESS BLDA CALC-SCNC: -5.9 MMOL/L
BASE EXCESS BLDA CALC-SCNC: -6.4 MMOL/L
BASE EXCESS BLDA CALC-SCNC: -7.9 MMOL/L
BASE EXCESS BLDA CALC-SCNC: -7.9 MMOL/L
BASE EXCESS BLDA CALC-SCNC: -8 MMOL/L
BASE EXCESS BLDA CALC-SCNC: -8.1 MMOL/L
BASE EXCESS BLDA CALC-SCNC: -9 MMOL/L (ref -2–3)
BASE EXCESS BLDV CALC-SCNC: -2.6 MMOL/L
BASE EXCESS BLDV CALC-SCNC: -3.4 MMOL/L
BASE EXCESS BLDV CALC-SCNC: -4.8 MMOL/L
BASE EXCESS BLDV CALC-SCNC: -6.6 MMOL/L
BASOPHILS # BLD AUTO: 0.04 THOUSANDS/ΜL (ref 0–0.1)
BASOPHILS # BLD AUTO: 0.08 THOUSANDS/ΜL (ref 0–0.1)
BASOPHILS # BLD MANUAL: 0 THOUSAND/UL (ref 0–0.1)
BASOPHILS NFR BLD AUTO: 0 % (ref 0–1)
BASOPHILS NFR BLD AUTO: 0 % (ref 0–1)
BASOPHILS NFR MAR MANUAL: 0 % (ref 0–1)
BETA GLOB ABNORMAL SERPL ELPH-MCNC: 0.22 G/DL (ref 0.4–0.8)
BETA1 GLOB SERPL ELPH-MCNC: 4.6 % (ref 5–13)
BETA2 GLOB SERPL ELPH-MCNC: 4.6 % (ref 2–8)
BETA2+GAMMA GLOB SERPL ELPH-MCNC: 0.22 G/DL (ref 0.2–0.5)
BILIRUB DIRECT SERPL-MCNC: 1.24 MG/DL (ref 0–0.2)
BILIRUB DIRECT SERPL-MCNC: 1.31 MG/DL (ref 0–0.2)
BILIRUB DIRECT SERPL-MCNC: 1.41 MG/DL (ref 0–0.2)
BILIRUB SERPL-MCNC: 1.5 MG/DL (ref 0.2–1)
BILIRUB SERPL-MCNC: 1.68 MG/DL (ref 0.2–1)
BILIRUB SERPL-MCNC: 1.89 MG/DL (ref 0.2–1)
BILIRUB SERPL-MCNC: 2.49 MG/DL (ref 0.2–1)
BILIRUB UR QL STRIP: NEGATIVE
BLD GP AB SCN SERPL QL: NEGATIVE
BUN SERPL-MCNC: 40 MG/DL (ref 5–25)
BUN SERPL-MCNC: 46 MG/DL (ref 5–25)
BUN SERPL-MCNC: 51 MG/DL (ref 5–25)
BUN SERPL-MCNC: 51 MG/DL (ref 5–25)
BUN SERPL-MCNC: 52 MG/DL (ref 5–25)
BUN SERPL-MCNC: 52 MG/DL (ref 5–25)
BUN SERPL-MCNC: 53 MG/DL (ref 5–25)
BUN SERPL-MCNC: 53 MG/DL (ref 5–25)
BUN SERPL-MCNC: 56 MG/DL (ref 5–25)
BUN SERPL-MCNC: 57 MG/DL (ref 5–25)
BUN SERPL-MCNC: 58 MG/DL (ref 5–25)
BUN SERPL-MCNC: 58 MG/DL (ref 5–25)
BUN SERPL-MCNC: 59 MG/DL (ref 5–25)
BUN SERPL-MCNC: 60 MG/DL (ref 5–25)
BUN SERPL-MCNC: 61 MG/DL (ref 5–25)
C DIFF TOX B TCDB STL QL NAA+PROBE: NEGATIVE
C3 SERPL-MCNC: 36.2 MG/DL (ref 90–180)
C4 SERPL-MCNC: 8 MG/DL (ref 10–40)
CA-I BLD-SCNC: 0.98 MMOL/L (ref 1.12–1.32)
CA-I BLD-SCNC: 1.06 MMOL/L (ref 1.12–1.32)
CA-I BLD-SCNC: 1.07 MMOL/L (ref 1.12–1.32)
CA-I BLD-SCNC: 1.09 MMOL/L (ref 1.12–1.32)
CA-I BLD-SCNC: 1.11 MMOL/L (ref 1.12–1.32)
CA-I BLD-SCNC: 1.12 MMOL/L (ref 1.12–1.32)
CA-I BLD-SCNC: 1.12 MMOL/L (ref 1.12–1.32)
CA-I BLD-SCNC: 1.13 MMOL/L (ref 1.12–1.32)
CA-I BLD-SCNC: 1.13 MMOL/L (ref 1.12–1.32)
CA-I BLD-SCNC: 1.16 MMOL/L (ref 1.12–1.32)
CALCIUM ALBUM COR SERPL-MCNC: 10.2 MG/DL (ref 8.3–10.1)
CALCIUM SERPL-MCNC: 7.8 MG/DL (ref 8.3–10.1)
CALCIUM SERPL-MCNC: 7.9 MG/DL (ref 8.3–10.1)
CALCIUM SERPL-MCNC: 8 MG/DL (ref 8.3–10.1)
CALCIUM SERPL-MCNC: 8.1 MG/DL (ref 8.3–10.1)
CALCIUM SERPL-MCNC: 8.3 MG/DL (ref 8.3–10.1)
CALCIUM SERPL-MCNC: 8.4 MG/DL (ref 8.3–10.1)
CALCIUM SERPL-MCNC: 8.4 MG/DL (ref 8.3–10.1)
CALCIUM SERPL-MCNC: 8.6 MG/DL (ref 8.3–10.1)
CALCIUM SERPL-MCNC: 8.7 MG/DL (ref 8.3–10.1)
CALCIUM SERPL-MCNC: 8.8 MG/DL (ref 8.3–10.1)
CALCIUM SERPL-MCNC: 9.6 MG/DL (ref 8.3–10.1)
CH50 SERPL-ACNC: 14 U/ML
CHLORIDE SERPL-SCNC: 101 MMOL/L (ref 100–108)
CHLORIDE SERPL-SCNC: 103 MMOL/L (ref 100–108)
CHLORIDE SERPL-SCNC: 104 MMOL/L (ref 100–108)
CHLORIDE SERPL-SCNC: 105 MMOL/L (ref 100–108)
CHLORIDE SERPL-SCNC: 105 MMOL/L (ref 100–108)
CHLORIDE SERPL-SCNC: 106 MMOL/L (ref 100–108)
CHLORIDE SERPL-SCNC: 107 MMOL/L (ref 100–108)
CHLORIDE SERPL-SCNC: 107 MMOL/L (ref 100–108)
CHLORIDE SERPL-SCNC: 108 MMOL/L (ref 100–108)
CLARITY UR: ABNORMAL
CO2 SERPL-SCNC: 16 MMOL/L (ref 21–32)
CO2 SERPL-SCNC: 17 MMOL/L (ref 21–32)
CO2 SERPL-SCNC: 17 MMOL/L (ref 21–32)
CO2 SERPL-SCNC: 19 MMOL/L (ref 21–32)
CO2 SERPL-SCNC: 20 MMOL/L (ref 21–32)
CO2 SERPL-SCNC: 21 MMOL/L (ref 21–32)
CO2 SERPL-SCNC: 22 MMOL/L (ref 21–32)
CO2 SERPL-SCNC: 23 MMOL/L (ref 21–32)
CO2 SERPL-SCNC: 24 MMOL/L (ref 21–32)
COLOR UR: ABNORMAL
CREAT SERPL-MCNC: 1.2 MG/DL (ref 0.6–1.3)
CREAT SERPL-MCNC: 1.24 MG/DL (ref 0.6–1.3)
CREAT SERPL-MCNC: 1.3 MG/DL (ref 0.6–1.3)
CREAT SERPL-MCNC: 1.33 MG/DL (ref 0.6–1.3)
CREAT SERPL-MCNC: 1.33 MG/DL (ref 0.6–1.3)
CREAT SERPL-MCNC: 1.35 MG/DL (ref 0.6–1.3)
CREAT SERPL-MCNC: 1.43 MG/DL (ref 0.6–1.3)
CREAT SERPL-MCNC: 1.49 MG/DL (ref 0.6–1.3)
CREAT SERPL-MCNC: 1.58 MG/DL (ref 0.6–1.3)
CREAT SERPL-MCNC: 1.59 MG/DL (ref 0.6–1.3)
CREAT SERPL-MCNC: 1.62 MG/DL (ref 0.6–1.3)
CREAT SERPL-MCNC: 1.65 MG/DL (ref 0.6–1.3)
CREAT SERPL-MCNC: 1.75 MG/DL (ref 0.6–1.3)
CREAT SERPL-MCNC: 1.79 MG/DL (ref 0.6–1.3)
CREAT SERPL-MCNC: 1.91 MG/DL (ref 0.6–1.3)
EOSINOPHIL # BLD AUTO: 0.01 THOUSAND/ΜL (ref 0–0.61)
EOSINOPHIL # BLD AUTO: 0.02 THOUSAND/ΜL (ref 0–0.61)
EOSINOPHIL # BLD MANUAL: 0 THOUSAND/UL (ref 0–0.4)
EOSINOPHIL NFR BLD AUTO: 0 % (ref 0–6)
EOSINOPHIL NFR BLD AUTO: 0 % (ref 0–6)
EOSINOPHIL NFR BLD MANUAL: 0 % (ref 0–6)
ERYTHROCYTE [DISTWIDTH] IN BLOOD BY AUTOMATED COUNT: 16 % (ref 11.6–15.1)
ERYTHROCYTE [DISTWIDTH] IN BLOOD BY AUTOMATED COUNT: 16.6 % (ref 11.6–15.1)
ERYTHROCYTE [DISTWIDTH] IN BLOOD BY AUTOMATED COUNT: 16.6 % (ref 11.6–15.1)
ERYTHROCYTE [DISTWIDTH] IN BLOOD BY AUTOMATED COUNT: 17.1 % (ref 11.6–15.1)
EST. AVERAGE GLUCOSE BLD GHB EST-MCNC: 126 MG/DL
FIO2 GAS DIL.REBREATH: 60 L
GAMMA GLOB ABNORMAL SERPL ELPH-MCNC: 0.51 G/DL (ref 0.5–1.6)
GAMMA GLOB SERPL ELPH-MCNC: 10.6 % (ref 12–22)
GFR SERPL CREATININE-BSD FRML MDRD: 25 ML/MIN/1.73SQ M
GFR SERPL CREATININE-BSD FRML MDRD: 28 ML/MIN/1.73SQ M
GFR SERPL CREATININE-BSD FRML MDRD: 28 ML/MIN/1.73SQ M
GFR SERPL CREATININE-BSD FRML MDRD: 30 ML/MIN/1.73SQ M
GFR SERPL CREATININE-BSD FRML MDRD: 31 ML/MIN/1.73SQ M
GFR SERPL CREATININE-BSD FRML MDRD: 32 ML/MIN/1.73SQ M
GFR SERPL CREATININE-BSD FRML MDRD: 32 ML/MIN/1.73SQ M
GFR SERPL CREATININE-BSD FRML MDRD: 34 ML/MIN/1.73SQ M
GFR SERPL CREATININE-BSD FRML MDRD: 36 ML/MIN/1.73SQ M
GFR SERPL CREATININE-BSD FRML MDRD: 39 ML/MIN/1.73SQ M
GFR SERPL CREATININE-BSD FRML MDRD: 41 ML/MIN/1.73SQ M
GFR SERPL CREATININE-BSD FRML MDRD: 43 ML/MIN/1.73SQ M
GFR SERPL CREATININE-BSD FRML MDRD: 45 ML/MIN/1.73SQ M
GLUCOSE SERPL-MCNC: 109 MG/DL (ref 65–140)
GLUCOSE SERPL-MCNC: 111 MG/DL (ref 65–140)
GLUCOSE SERPL-MCNC: 111 MG/DL (ref 65–140)
GLUCOSE SERPL-MCNC: 116 MG/DL (ref 65–140)
GLUCOSE SERPL-MCNC: 117 MG/DL (ref 65–140)
GLUCOSE SERPL-MCNC: 120 MG/DL (ref 65–140)
GLUCOSE SERPL-MCNC: 123 MG/DL (ref 65–140)
GLUCOSE SERPL-MCNC: 129 MG/DL (ref 65–140)
GLUCOSE SERPL-MCNC: 134 MG/DL (ref 65–140)
GLUCOSE SERPL-MCNC: 144 MG/DL (ref 65–140)
GLUCOSE SERPL-MCNC: 145 MG/DL (ref 65–140)
GLUCOSE SERPL-MCNC: 152 MG/DL (ref 65–140)
GLUCOSE SERPL-MCNC: 152 MG/DL (ref 65–140)
GLUCOSE SERPL-MCNC: 157 MG/DL (ref 65–140)
GLUCOSE SERPL-MCNC: 167 MG/DL (ref 65–140)
GLUCOSE SERPL-MCNC: 170 MG/DL (ref 65–140)
GLUCOSE SERPL-MCNC: 172 MG/DL (ref 65–140)
GLUCOSE SERPL-MCNC: 178 MG/DL (ref 65–140)
GLUCOSE SERPL-MCNC: 179 MG/DL (ref 65–140)
GLUCOSE SERPL-MCNC: 192 MG/DL (ref 65–140)
GLUCOSE SERPL-MCNC: 194 MG/DL (ref 65–140)
GLUCOSE SERPL-MCNC: 198 MG/DL (ref 65–140)
GLUCOSE SERPL-MCNC: 203 MG/DL (ref 65–140)
GLUCOSE SERPL-MCNC: 210 MG/DL (ref 65–140)
GLUCOSE SERPL-MCNC: 211 MG/DL (ref 65–140)
GLUCOSE SERPL-MCNC: 212 MG/DL (ref 65–140)
GLUCOSE SERPL-MCNC: 222 MG/DL (ref 65–140)
GLUCOSE SERPL-MCNC: 223 MG/DL (ref 65–140)
GLUCOSE SERPL-MCNC: 224 MG/DL (ref 65–140)
GLUCOSE SERPL-MCNC: 230 MG/DL (ref 65–140)
GLUCOSE SERPL-MCNC: 235 MG/DL (ref 65–140)
GLUCOSE SERPL-MCNC: 242 MG/DL (ref 65–140)
GLUCOSE SERPL-MCNC: 245 MG/DL (ref 65–140)
GLUCOSE SERPL-MCNC: 251 MG/DL (ref 65–140)
GLUCOSE SERPL-MCNC: 254 MG/DL (ref 65–140)
GLUCOSE SERPL-MCNC: 260 MG/DL (ref 65–140)
GLUCOSE SERPL-MCNC: 262 MG/DL (ref 65–140)
GLUCOSE SERPL-MCNC: 265 MG/DL (ref 65–140)
GLUCOSE SERPL-MCNC: 314 MG/DL (ref 65–140)
GLUCOSE SERPL-MCNC: 93 MG/DL (ref 65–140)
GLUCOSE UR STRIP-MCNC: NEGATIVE MG/DL
GRAM STN SPEC: ABNORMAL
GRAM STN SPEC: ABNORMAL
HBA1C MFR BLD: 6 %
HCO3 BLDA-SCNC: 15.1 MMOL/L (ref 22–28)
HCO3 BLDA-SCNC: 15.2 MMOL/L (ref 22–28)
HCO3 BLDA-SCNC: 15.6 MMOL/L (ref 22–28)
HCO3 BLDA-SCNC: 16 MMOL/L (ref 22–28)
HCO3 BLDA-SCNC: 17.3 MMOL/L (ref 22–28)
HCO3 BLDA-SCNC: 17.9 MMOL/L (ref 22–28)
HCO3 BLDA-SCNC: 18.2 MMOL/L (ref 22–28)
HCO3 BLDA-SCNC: 18.6 MMOL/L (ref 22–28)
HCO3 BLDA-SCNC: 18.7 MMOL/L (ref 22–28)
HCO3 BLDA-SCNC: 19.2 MMOL/L (ref 22–28)
HCO3 BLDA-SCNC: 19.8 MMOL/L (ref 22–28)
HCO3 BLDA-SCNC: 19.8 MMOL/L (ref 22–28)
HCO3 BLDA-SCNC: 20.1 MMOL/L (ref 22–28)
HCO3 BLDA-SCNC: 20.4 MMOL/L (ref 22–28)
HCO3 BLDA-SCNC: 21 MMOL/L (ref 22–28)
HCO3 BLDA-SCNC: 21.2 MMOL/L (ref 22–28)
HCO3 BLDA-SCNC: 22.9 MMOL/L (ref 22–28)
HCO3 BLDV-SCNC: 20.7 MMOL/L (ref 24–30)
HCO3 BLDV-SCNC: 20.8 MMOL/L (ref 24–30)
HCO3 BLDV-SCNC: 22.6 MMOL/L (ref 24–30)
HCO3 BLDV-SCNC: 23.6 MMOL/L (ref 24–30)
HCT VFR BLD AUTO: 25.8 % (ref 34.8–46.1)
HCT VFR BLD AUTO: 26.2 % (ref 34.8–46.1)
HCT VFR BLD AUTO: 26.7 % (ref 34.8–46.1)
HCT VFR BLD AUTO: 30.1 % (ref 34.8–46.1)
HCT VFR BLD AUTO: 30.5 % (ref 34.8–46.1)
HCT VFR BLD AUTO: 32 % (ref 34.8–46.1)
HCT VFR BLD CALC: 29 % (ref 34.8–46.1)
HCT VFR BLD CALC: 30 % (ref 34.8–46.1)
HGB BLD-MCNC: 10 G/DL (ref 11.5–15.4)
HGB BLD-MCNC: 10 G/DL (ref 11.5–15.4)
HGB BLD-MCNC: 10.1 G/DL (ref 11.5–15.4)
HGB BLD-MCNC: 10.5 G/DL (ref 11.5–15.4)
HGB BLD-MCNC: 8.4 G/DL (ref 11.5–15.4)
HGB BLD-MCNC: 8.4 G/DL (ref 11.5–15.4)
HGB BLD-MCNC: 8.5 G/DL (ref 11.5–15.4)
HGB BLD-MCNC: 8.5 G/DL (ref 11.5–15.4)
HGB BLD-MCNC: 8.6 G/DL (ref 11.5–15.4)
HGB BLD-MCNC: 8.8 G/DL (ref 11.5–15.4)
HGB BLDA-MCNC: 10.2 G/DL (ref 11.5–15.4)
HGB BLDA-MCNC: 9.9 G/DL (ref 11.5–15.4)
HGB UR QL STRIP.AUTO: ABNORMAL
HOROWITZ INDEX BLDA+IHG-RTO: 100 MM[HG]
HOROWITZ INDEX BLDA+IHG-RTO: 40 MM[HG]
HOROWITZ INDEX BLDA+IHG-RTO: 50 MM[HG]
HOROWITZ INDEX BLDA+IHG-RTO: 60 MM[HG]
HOROWITZ INDEX BLDA+IHG-RTO: 70 MM[HG]
HOROWITZ INDEX BLDA+IHG-RTO: 80 MM[HG]
HOROWITZ INDEX BLDA+IHG-RTO: 80 MM[HG]
IGG/ALB SER: 1.16 {RATIO} (ref 1.1–1.8)
IMM GRANULOCYTES # BLD AUTO: 0.32 THOUSAND/UL (ref 0–0.2)
IMM GRANULOCYTES # BLD AUTO: >0.5 THOUSAND/UL (ref 0–0.2)
IMM GRANULOCYTES NFR BLD AUTO: 1 % (ref 0–2)
IMM GRANULOCYTES NFR BLD AUTO: 2 % (ref 0–2)
INR PPP: 2.3 (ref 0.84–1.19)
INR PPP: 2.74 (ref 0.84–1.19)
KETONES UR STRIP-MCNC: ABNORMAL MG/DL
LACTATE SERPL-SCNC: 1.6 MMOL/L (ref 0.5–2)
LACTATE SERPL-SCNC: 1.8 MMOL/L (ref 0.5–2)
LACTATE SERPL-SCNC: 2.1 MMOL/L (ref 0.5–2)
LACTATE SERPL-SCNC: 2.3 MMOL/L (ref 0.5–2)
LACTATE SERPL-SCNC: 2.5 MMOL/L (ref 0.5–2)
LACTATE SERPL-SCNC: 2.7 MMOL/L (ref 0.5–2)
LACTATE SERPL-SCNC: 2.7 MMOL/L (ref 0.5–2)
LACTATE SERPL-SCNC: 3.2 MMOL/L (ref 0.5–2)
LACTATE SERPL-SCNC: 3.3 MMOL/L (ref 0.5–2)
LACTATE SERPL-SCNC: 3.3 MMOL/L (ref 0.5–2)
LACTATE SERPL-SCNC: 3.4 MMOL/L (ref 0.5–2)
LACTATE SERPL-SCNC: 3.4 MMOL/L (ref 0.5–2)
LACTATE SERPL-SCNC: 3.7 MMOL/L (ref 0.5–2)
LACTATE SERPL-SCNC: 3.7 MMOL/L (ref 0.5–2)
LACTATE SERPL-SCNC: 4.1 MMOL/L (ref 0.5–2)
LACTATE SERPL-SCNC: 4.1 MMOL/L (ref 0.5–2)
LACTATE SERPL-SCNC: 4.6 MMOL/L (ref 0.5–2)
LACTATE SERPL-SCNC: 4.8 MMOL/L (ref 0.5–2)
LEUKOCYTE ESTERASE UR QL STRIP: ABNORMAL
LYMPHOCYTES # BLD AUTO: 0.93 THOUSANDS/ΜL (ref 0.6–4.47)
LYMPHOCYTES # BLD AUTO: 1.2 THOUSAND/UL (ref 0.6–4.47)
LYMPHOCYTES # BLD AUTO: 1.56 THOUSANDS/ΜL (ref 0.6–4.47)
LYMPHOCYTES # BLD AUTO: 4 % (ref 14–44)
LYMPHOCYTES NFR BLD AUTO: 4 % (ref 14–44)
LYMPHOCYTES NFR BLD AUTO: 4 % (ref 14–44)
MAGNESIUM SERPL-MCNC: 1.1 MG/DL (ref 1.6–2.6)
MAGNESIUM SERPL-MCNC: 2.1 MG/DL (ref 1.6–2.6)
MAGNESIUM SERPL-MCNC: 2.4 MG/DL (ref 1.6–2.6)
MAGNESIUM SERPL-MCNC: 2.5 MG/DL (ref 1.6–2.6)
MAGNESIUM SERPL-MCNC: 2.7 MG/DL (ref 1.6–2.6)
MAGNESIUM SERPL-MCNC: 2.7 MG/DL (ref 1.6–2.6)
MAGNESIUM SERPL-MCNC: 2.9 MG/DL (ref 1.6–2.6)
MAGNESIUM SERPL-MCNC: 2.9 MG/DL (ref 1.6–2.6)
MCH RBC QN AUTO: 30.1 PG (ref 26.8–34.3)
MCH RBC QN AUTO: 30.2 PG (ref 26.8–34.3)
MCH RBC QN AUTO: 30.3 PG (ref 26.8–34.3)
MCH RBC QN AUTO: 30.3 PG (ref 26.8–34.3)
MCH RBC QN AUTO: 30.8 PG (ref 26.8–34.3)
MCH RBC QN AUTO: 31 PG (ref 26.8–34.3)
MCHC RBC AUTO-ENTMCNC: 32.1 G/DL (ref 31.4–37.4)
MCHC RBC AUTO-ENTMCNC: 32.8 G/DL (ref 31.4–37.4)
MCHC RBC AUTO-ENTMCNC: 32.9 G/DL (ref 31.4–37.4)
MCHC RBC AUTO-ENTMCNC: 33 G/DL (ref 31.4–37.4)
MCHC RBC AUTO-ENTMCNC: 33.1 G/DL (ref 31.4–37.4)
MCHC RBC AUTO-ENTMCNC: 33.2 G/DL (ref 31.4–37.4)
MCV RBC AUTO: 92 FL (ref 82–98)
MCV RBC AUTO: 93 FL (ref 82–98)
MCV RBC AUTO: 93 FL (ref 82–98)
MCV RBC AUTO: 94 FL (ref 82–98)
MONOCYTES # BLD AUTO: 1.8 THOUSAND/UL (ref 0–1.22)
MONOCYTES # BLD AUTO: 2.37 THOUSAND/ΜL (ref 0.17–1.22)
MONOCYTES # BLD AUTO: 2.82 THOUSAND/ΜL (ref 0.17–1.22)
MONOCYTES NFR BLD AUTO: 10 % (ref 4–12)
MONOCYTES NFR BLD AUTO: 8 % (ref 4–12)
MONOCYTES NFR BLD: 6 % (ref 4–12)
MRSA NOSE QL CULT: NORMAL
NEUTROPHILS # BLD AUTO: 20.01 THOUSANDS/ΜL (ref 1.85–7.62)
NEUTROPHILS # BLD AUTO: 30.49 THOUSANDS/ΜL (ref 1.85–7.62)
NEUTROPHILS # BLD MANUAL: 27.06 THOUSAND/UL (ref 1.85–7.62)
NEUTS BAND NFR BLD MANUAL: 3 % (ref 0–8)
NEUTS SEG NFR BLD AUTO: 85 % (ref 43–75)
NEUTS SEG NFR BLD AUTO: 86 % (ref 43–75)
NEUTS SEG NFR BLD AUTO: 87 % (ref 43–75)
NITRITE UR QL STRIP: NEGATIVE
NON-SQ EPI CELLS URNS QL MICRO: ABNORMAL /HPF
NRBC BLD AUTO-RTO: 0 /100 WBCS
NT-PROBNP SERPL-MCNC: ABNORMAL PG/ML
O2 CT BLDA-SCNC: 11.4 ML/DL (ref 16–23)
O2 CT BLDA-SCNC: 12.2 ML/DL (ref 16–23)
O2 CT BLDA-SCNC: 12.4 ML/DL (ref 16–23)
O2 CT BLDA-SCNC: 12.7 ML/DL (ref 16–23)
O2 CT BLDA-SCNC: 13 ML/DL (ref 16–23)
O2 CT BLDA-SCNC: 13.8 ML/DL (ref 16–23)
O2 CT BLDA-SCNC: 13.9 ML/DL (ref 16–23)
O2 CT BLDA-SCNC: 14.5 ML/DL (ref 16–23)
O2 CT BLDA-SCNC: 14.6 ML/DL (ref 16–23)
O2 CT BLDA-SCNC: 15.2 ML/DL (ref 16–23)
O2 CT BLDA-SCNC: 15.3 ML/DL (ref 16–23)
O2 CT BLDA-SCNC: 15.3 ML/DL (ref 16–23)
O2 CT BLDA-SCNC: 15.5 ML/DL (ref 16–23)
O2 CT BLDA-SCNC: 16.6 ML/DL (ref 16–23)
O2 CT BLDA-SCNC: 16.9 ML/DL (ref 16–23)
O2 CT BLDV-SCNC: 6.4 ML/DL
O2 CT BLDV-SCNC: 6.5 ML/DL
O2 CT BLDV-SCNC: 8.1 ML/DL
O2 CT BLDV-SCNC: 8.1 ML/DL
OXYHGB MFR BLDA: 89.1 % (ref 94–97)
OXYHGB MFR BLDA: 91.6 % (ref 94–97)
OXYHGB MFR BLDA: 91.6 % (ref 94–97)
OXYHGB MFR BLDA: 91.7 % (ref 94–97)
OXYHGB MFR BLDA: 92.2 % (ref 94–97)
OXYHGB MFR BLDA: 93 % (ref 94–97)
OXYHGB MFR BLDA: 93.1 % (ref 94–97)
OXYHGB MFR BLDA: 93.2 % (ref 94–97)
OXYHGB MFR BLDA: 94.7 % (ref 94–97)
OXYHGB MFR BLDA: 95.8 % (ref 94–97)
OXYHGB MFR BLDA: 95.9 % (ref 94–97)
OXYHGB MFR BLDA: 96 % (ref 94–97)
OXYHGB MFR BLDA: 96.2 % (ref 94–97)
OXYHGB MFR BLDA: 96.9 % (ref 94–97)
OXYHGB MFR BLDA: 97.7 % (ref 94–97)
PCO2 BLD: 20 MMOL/L (ref 21–32)
PCO2 BLD: 22 MMOL/L (ref 21–32)
PCO2 BLD: 38 MM HG (ref 36–44)
PCO2 BLD: 44.9 MM HG (ref 36–44)
PCO2 BLDA: 32 MM HG (ref 36–44)
PCO2 BLDA: 33.3 MM HG (ref 36–44)
PCO2 BLDA: 34.1 MM HG (ref 36–44)
PCO2 BLDA: 34.3 MM HG (ref 36–44)
PCO2 BLDA: 34.6 MM HG (ref 36–44)
PCO2 BLDA: 37.1 MM HG (ref 36–44)
PCO2 BLDA: 37.2 MM HG (ref 36–44)
PCO2 BLDA: 37.6 MM HG (ref 36–44)
PCO2 BLDA: 37.9 MM HG (ref 36–44)
PCO2 BLDA: 39 MM HG (ref 36–44)
PCO2 BLDA: 39.5 MM HG (ref 36–44)
PCO2 BLDA: 40.6 MM HG (ref 36–44)
PCO2 BLDA: 41.1 MM HG (ref 36–44)
PCO2 BLDA: 42.9 MM HG (ref 36–44)
PCO2 BLDA: 43.7 MM HG (ref 36–44)
PCO2 BLDV: 39.8 MM HG (ref 42–50)
PCO2 BLDV: 44.8 MM HG (ref 42–50)
PCO2 BLDV: 47.1 MM HG (ref 42–50)
PCO2 BLDV: 50.5 MM HG (ref 42–50)
PEEP RESPIRATORY: 10 CM[H2O]
PEEP RESPIRATORY: 6 CM[H2O]
PEEP RESPIRATORY: 8 CM[H2O]
PH BLD: 7.22 [PH] (ref 7.35–7.45)
PH BLD: 7.35 [PH] (ref 7.35–7.45)
PH BLDA: 7.24 [PH] (ref 7.35–7.45)
PH BLDA: 7.25 [PH] (ref 7.35–7.45)
PH BLDA: 7.26 [PH] (ref 7.35–7.45)
PH BLDA: 7.26 [PH] (ref 7.35–7.45)
PH BLDA: 7.28 [PH] (ref 7.35–7.45)
PH BLDA: 7.28 [PH] (ref 7.35–7.45)
PH BLDA: 7.29 [PH] (ref 7.35–7.45)
PH BLDA: 7.3 [PH] (ref 7.35–7.45)
PH BLDA: 7.32 [PH] (ref 7.35–7.45)
PH BLDA: 7.36 [PH] (ref 7.35–7.45)
PH BLDA: 7.37 [PH] (ref 7.35–7.45)
PH BLDA: 7.38 [PH] (ref 7.35–7.45)
PH BLDA: 7.4 [PH] (ref 7.35–7.45)
PH BLDV: 7.23 [PH] (ref 7.3–7.4)
PH BLDV: 7.32 [PH] (ref 7.3–7.4)
PH BLDV: 7.32 [PH] (ref 7.3–7.4)
PH BLDV: 7.33 [PH] (ref 7.3–7.4)
PH UR STRIP.AUTO: 5 [PH]
PHOSPHATE SERPL-MCNC: 2.2 MG/DL (ref 2.3–4.1)
PHOSPHATE SERPL-MCNC: 2.8 MG/DL (ref 2.3–4.1)
PHOSPHATE SERPL-MCNC: 3.4 MG/DL (ref 2.3–4.1)
PHOSPHATE SERPL-MCNC: 3.5 MG/DL (ref 2.3–4.1)
PHOSPHATE SERPL-MCNC: 4.1 MG/DL (ref 2.3–4.1)
PHOSPHATE SERPL-MCNC: 4.6 MG/DL (ref 2.3–4.1)
PHOSPHATE SERPL-MCNC: 5 MG/DL (ref 2.3–4.1)
PLATELET # BLD AUTO: 303 THOUSANDS/UL (ref 149–390)
PLATELET # BLD AUTO: 333 THOUSANDS/UL (ref 149–390)
PLATELET # BLD AUTO: 340 THOUSANDS/UL (ref 149–390)
PLATELET # BLD AUTO: 346 THOUSANDS/UL (ref 149–390)
PLATELET # BLD AUTO: 389 THOUSANDS/UL (ref 149–390)
PLATELET # BLD AUTO: 415 THOUSANDS/UL (ref 149–390)
PLATELET BLD QL SMEAR: ADEQUATE
PMV BLD AUTO: 10.5 FL (ref 8.9–12.7)
PMV BLD AUTO: 10.5 FL (ref 8.9–12.7)
PMV BLD AUTO: 10.6 FL (ref 8.9–12.7)
PMV BLD AUTO: 11 FL (ref 8.9–12.7)
PMV BLD AUTO: 11.2 FL (ref 8.9–12.7)
PMV BLD AUTO: 11.4 FL (ref 8.9–12.7)
PO2 BLD: 132 MM HG (ref 75–129)
PO2 BLD: 82 MM HG (ref 75–129)
PO2 BLDA: 107.1 MM HG (ref 75–129)
PO2 BLDA: 113.8 MM HG (ref 75–129)
PO2 BLDA: 64.9 MM HG (ref 75–129)
PO2 BLDA: 65.9 MM HG (ref 75–129)
PO2 BLDA: 69.4 MM HG (ref 75–129)
PO2 BLDA: 69.7 MM HG (ref 75–129)
PO2 BLDA: 70 MM HG (ref 75–129)
PO2 BLDA: 70.2 MM HG (ref 75–129)
PO2 BLDA: 70.8 MM HG (ref 75–129)
PO2 BLDA: 75.5 MM HG (ref 75–129)
PO2 BLDA: 78.9 MM HG (ref 75–129)
PO2 BLDA: 87.8 MM HG (ref 75–129)
PO2 BLDA: 87.9 MM HG (ref 75–129)
PO2 BLDA: 89.6 MM HG (ref 75–129)
PO2 BLDA: 99.5 MM HG (ref 75–129)
PO2 BLDV: 28.7 MM HG (ref 35–45)
PO2 BLDV: 30.3 MM HG (ref 35–45)
PO2 BLDV: 33.9 MM HG (ref 35–45)
PO2 BLDV: 34.8 MM HG (ref 35–45)
POTASSIUM BLD-SCNC: 3.5 MMOL/L (ref 3.5–5.3)
POTASSIUM BLD-SCNC: 3.6 MMOL/L (ref 3.5–5.3)
POTASSIUM SERPL-SCNC: 3.4 MMOL/L (ref 3.5–5.3)
POTASSIUM SERPL-SCNC: 3.6 MMOL/L (ref 3.5–5.3)
POTASSIUM SERPL-SCNC: 3.6 MMOL/L (ref 3.5–5.3)
POTASSIUM SERPL-SCNC: 3.7 MMOL/L (ref 3.5–5.3)
POTASSIUM SERPL-SCNC: 3.8 MMOL/L (ref 3.5–5.3)
POTASSIUM SERPL-SCNC: 3.9 MMOL/L (ref 3.5–5.3)
POTASSIUM SERPL-SCNC: 4 MMOL/L (ref 3.5–5.3)
POTASSIUM SERPL-SCNC: 4.1 MMOL/L (ref 3.5–5.3)
POTASSIUM SERPL-SCNC: 4.2 MMOL/L (ref 3.5–5.3)
POTASSIUM SERPL-SCNC: 4.5 MMOL/L (ref 3.5–5.3)
POTASSIUM SERPL-SCNC: 4.8 MMOL/L (ref 3.5–5.3)
PROCALCITONIN SERPL-MCNC: 11.45 NG/ML
PROCALCITONIN SERPL-MCNC: 12.65 NG/ML
PROCALCITONIN SERPL-MCNC: 21.47 NG/ML
PROCALCITONIN SERPL-MCNC: 22.23 NG/ML
PROCALCITONIN SERPL-MCNC: 25.71 NG/ML
PROCALCITONIN SERPL-MCNC: 6.9 NG/ML
PROT PATTERN SERPL ELPH-IMP: ABNORMAL
PROT SERPL-MCNC: 4.8 G/DL (ref 6.4–8.2)
PROT SERPL-MCNC: 5.5 G/DL (ref 6.4–8.2)
PROT SERPL-MCNC: 5.5 G/DL (ref 6.4–8.2)
PROT SERPL-MCNC: 5.6 G/DL (ref 6.4–8.2)
PROT SERPL-MCNC: 6 G/DL (ref 6.4–8.2)
PROT UR STRIP-MCNC: ABNORMAL MG/DL
PROTHROMBIN TIME: 25.2 SECONDS (ref 11.6–14.5)
PROTHROMBIN TIME: 28.8 SECONDS (ref 11.6–14.5)
PS CM H2O: 6
PS CM H2O: 6
PS VENT FIO2: 40
PS VENT FIO2: 40
PS VENT PEEP: 8
PS VENT PEEP: 8
QRS AXIS: 232 DEGREES
QRS AXIS: 239 DEGREES
QRS AXIS: 259 DEGREES
QRSD INTERVAL: 104 MS
QRSD INTERVAL: 113 MS
QRSD INTERVAL: 113 MS
QT INTERVAL: 271 MS
QT INTERVAL: 296 MS
QT INTERVAL: 304 MS
QTC INTERVAL: 412 MS
QTC INTERVAL: 420 MS
QTC INTERVAL: 463 MS
RBC # BLD AUTO: 2.79 MILLION/UL (ref 3.81–5.12)
RBC # BLD AUTO: 2.81 MILLION/UL (ref 3.81–5.12)
RBC # BLD AUTO: 2.9 MILLION/UL (ref 3.81–5.12)
RBC # BLD AUTO: 3.23 MILLION/UL (ref 3.81–5.12)
RBC # BLD AUTO: 3.28 MILLION/UL (ref 3.81–5.12)
RBC # BLD AUTO: 3.47 MILLION/UL (ref 3.81–5.12)
RBC #/AREA URNS AUTO: ABNORMAL /HPF
RBC MORPH BLD: NORMAL
RH BLD: POSITIVE
SAO2 % BLD FROM PO2: 95 % (ref 60–85)
SAO2 % BLD FROM PO2: 98 % (ref 60–85)
SODIUM BLD-SCNC: 134 MMOL/L (ref 136–145)
SODIUM BLD-SCNC: 136 MMOL/L (ref 136–145)
SODIUM SERPL-SCNC: 131 MMOL/L (ref 136–145)
SODIUM SERPL-SCNC: 133 MMOL/L (ref 136–145)
SODIUM SERPL-SCNC: 133 MMOL/L (ref 136–145)
SODIUM SERPL-SCNC: 135 MMOL/L (ref 136–145)
SODIUM SERPL-SCNC: 135 MMOL/L (ref 136–145)
SODIUM SERPL-SCNC: 136 MMOL/L (ref 136–145)
SODIUM SERPL-SCNC: 138 MMOL/L (ref 136–145)
SODIUM SERPL-SCNC: 139 MMOL/L (ref 136–145)
SODIUM SERPL-SCNC: 141 MMOL/L (ref 136–145)
SP GR UR STRIP.AUTO: 1.02 (ref 1–1.03)
SPECIMEN EXPIRATION DATE: NORMAL
SPECIMEN SOURCE: ABNORMAL
T WAVE AXIS: 84 DEGREES
T WAVE AXIS: 85 DEGREES
T WAVE AXIS: 89 DEGREES
TOTAL CELLS COUNTED SPEC: 100
TROPONIN I SERPL-MCNC: 0.02 NG/ML
UROBILINOGEN UR QL STRIP.AUTO: 0.2 E.U./DL
VANCOMYCIN TROUGH SERPL-MCNC: 19.6 UG/ML (ref 10–20)
VENT - PS: ABNORMAL
VENT - PS: ABNORMAL
VENT AC: 15
VENT AC: 20
VENT- AC: AC
VENTRICULAR RATE: 121 BPM
VENTRICULAR RATE: 139 BPM
VENTRICULAR RATE: 139 BPM
VT SETTING VENT: 450 ML
WBC # BLD AUTO: 23.68 THOUSAND/UL (ref 4.31–10.16)
WBC # BLD AUTO: 30.07 THOUSAND/UL (ref 4.31–10.16)
WBC # BLD AUTO: 34.67 THOUSAND/UL (ref 4.31–10.16)
WBC # BLD AUTO: 35.56 THOUSAND/UL (ref 4.31–10.16)
WBC # BLD AUTO: 38.05 THOUSAND/UL (ref 4.31–10.16)
WBC # BLD AUTO: 38.67 THOUSAND/UL (ref 4.31–10.16)
WBC #/AREA URNS AUTO: ABNORMAL /HPF

## 2021-01-01 PROCEDURE — 76770 US EXAM ABDO BACK WALL COMP: CPT

## 2021-01-01 PROCEDURE — 85730 THROMBOPLASTIN TIME PARTIAL: CPT | Performed by: PHYSICIAN ASSISTANT

## 2021-01-01 PROCEDURE — 99223 1ST HOSP IP/OBS HIGH 75: CPT | Performed by: INTERNAL MEDICINE

## 2021-01-01 PROCEDURE — 83605 ASSAY OF LACTIC ACID: CPT | Performed by: ANESTHESIOLOGY

## 2021-01-01 PROCEDURE — 88304 TISSUE EXAM BY PATHOLOGIST: CPT | Performed by: PATHOLOGY

## 2021-01-01 PROCEDURE — 99292 CRITICAL CARE ADDL 30 MIN: CPT | Performed by: ANESTHESIOLOGY

## 2021-01-01 PROCEDURE — 99291 CRITICAL CARE FIRST HOUR: CPT | Performed by: NURSE PRACTITIONER

## 2021-01-01 PROCEDURE — 85730 THROMBOPLASTIN TIME PARTIAL: CPT | Performed by: ANESTHESIOLOGY

## 2021-01-01 PROCEDURE — 85025 COMPLETE CBC W/AUTO DIFF WBC: CPT | Performed by: NURSE PRACTITIONER

## 2021-01-01 PROCEDURE — 83735 ASSAY OF MAGNESIUM: CPT | Performed by: NURSE PRACTITIONER

## 2021-01-01 PROCEDURE — 86901 BLOOD TYPING SEROLOGIC RH(D): CPT | Performed by: NURSE PRACTITIONER

## 2021-01-01 PROCEDURE — 93306 TTE W/DOPPLER COMPLETE: CPT

## 2021-01-01 PROCEDURE — 84100 ASSAY OF PHOSPHORUS: CPT | Performed by: NURSE PRACTITIONER

## 2021-01-01 PROCEDURE — 85018 HEMOGLOBIN: CPT | Performed by: SURGERY

## 2021-01-01 PROCEDURE — 83605 ASSAY OF LACTIC ACID: CPT | Performed by: NURSE PRACTITIONER

## 2021-01-01 PROCEDURE — 80048 BASIC METABOLIC PNL TOTAL CA: CPT | Performed by: INTERNAL MEDICINE

## 2021-01-01 PROCEDURE — 11004 DBRDMT SKIN XTRNL GENT&PER: CPT | Performed by: SURGERY

## 2021-01-01 PROCEDURE — 82947 ASSAY GLUCOSE BLOOD QUANT: CPT

## 2021-01-01 PROCEDURE — 82803 BLOOD GASES ANY COMBINATION: CPT

## 2021-01-01 PROCEDURE — 82805 BLOOD GASES W/O2 SATURATION: CPT | Performed by: ANESTHESIOLOGY

## 2021-01-01 PROCEDURE — 87205 SMEAR GRAM STAIN: CPT | Performed by: SURGERY

## 2021-01-01 PROCEDURE — 81001 URINALYSIS AUTO W/SCOPE: CPT | Performed by: NURSE PRACTITIONER

## 2021-01-01 PROCEDURE — 93306 TTE W/DOPPLER COMPLETE: CPT | Performed by: INTERNAL MEDICINE

## 2021-01-01 PROCEDURE — 82948 REAGENT STRIP/BLOOD GLUCOSE: CPT

## 2021-01-01 PROCEDURE — 86162 COMPLEMENT TOTAL (CH50): CPT | Performed by: INTERNAL MEDICINE

## 2021-01-01 PROCEDURE — 90945 DIALYSIS ONE EVALUATION: CPT

## 2021-01-01 PROCEDURE — 87075 CULTR BACTERIA EXCEPT BLOOD: CPT | Performed by: SURGERY

## 2021-01-01 PROCEDURE — 80048 BASIC METABOLIC PNL TOTAL CA: CPT | Performed by: NURSE PRACTITIONER

## 2021-01-01 PROCEDURE — 85027 COMPLETE CBC AUTOMATED: CPT | Performed by: PHYSICIAN ASSISTANT

## 2021-01-01 PROCEDURE — 83605 ASSAY OF LACTIC ACID: CPT | Performed by: SURGERY

## 2021-01-01 PROCEDURE — 74018 RADEX ABDOMEN 1 VIEW: CPT

## 2021-01-01 PROCEDURE — 84100 ASSAY OF PHOSPHORUS: CPT | Performed by: PHYSICIAN ASSISTANT

## 2021-01-01 PROCEDURE — 84165 PROTEIN E-PHORESIS SERUM: CPT | Performed by: INTERNAL MEDICINE

## 2021-01-01 PROCEDURE — 0JB90ZZ EXCISION OF BUTTOCK SUBCUTANEOUS TISSUE AND FASCIA, OPEN APPROACH: ICD-10-PCS | Performed by: ANESTHESIOLOGY

## 2021-01-01 PROCEDURE — 84484 ASSAY OF TROPONIN QUANT: CPT | Performed by: NURSE PRACTITIONER

## 2021-01-01 PROCEDURE — 80048 BASIC METABOLIC PNL TOTAL CA: CPT | Performed by: STUDENT IN AN ORGANIZED HEALTH CARE EDUCATION/TRAINING PROGRAM

## 2021-01-01 PROCEDURE — 83605 ASSAY OF LACTIC ACID: CPT | Performed by: STUDENT IN AN ORGANIZED HEALTH CARE EDUCATION/TRAINING PROGRAM

## 2021-01-01 PROCEDURE — 87493 C DIFF AMPLIFIED PROBE: CPT | Performed by: NURSE PRACTITIONER

## 2021-01-01 PROCEDURE — C9113 INJ PANTOPRAZOLE SODIUM, VIA: HCPCS | Performed by: NURSE PRACTITIONER

## 2021-01-01 PROCEDURE — 80048 BASIC METABOLIC PNL TOTAL CA: CPT | Performed by: SURGERY

## 2021-01-01 PROCEDURE — 71045 X-RAY EXAM CHEST 1 VIEW: CPT

## 2021-01-01 PROCEDURE — 31500 INSERT EMERGENCY AIRWAY: CPT | Performed by: PHYSICIAN ASSISTANT

## 2021-01-01 PROCEDURE — 82330 ASSAY OF CALCIUM: CPT | Performed by: PHYSICIAN ASSISTANT

## 2021-01-01 PROCEDURE — NC001 PR NO CHARGE: Performed by: ANESTHESIOLOGY

## 2021-01-01 PROCEDURE — 82330 ASSAY OF CALCIUM: CPT

## 2021-01-01 PROCEDURE — 87070 CULTURE OTHR SPECIMN AEROBIC: CPT | Performed by: SURGERY

## 2021-01-01 PROCEDURE — 85018 HEMOGLOBIN: CPT | Performed by: NURSE PRACTITIONER

## 2021-01-01 PROCEDURE — 03HY32Z INSERTION OF MONITORING DEVICE INTO UPPER ARTERY, PERCUTANEOUS APPROACH: ICD-10-PCS | Performed by: ANESTHESIOLOGY

## 2021-01-01 PROCEDURE — 0BH18EZ INSERTION OF ENDOTRACHEAL AIRWAY INTO TRACHEA, VIA NATURAL OR ARTIFICIAL OPENING ENDOSCOPIC: ICD-10-PCS | Performed by: ANESTHESIOLOGY

## 2021-01-01 PROCEDURE — 82330 ASSAY OF CALCIUM: CPT | Performed by: NURSE PRACTITIONER

## 2021-01-01 PROCEDURE — 80202 ASSAY OF VANCOMYCIN: CPT | Performed by: SURGERY

## 2021-01-01 PROCEDURE — 93005 ELECTROCARDIOGRAM TRACING: CPT

## 2021-01-01 PROCEDURE — 85014 HEMATOCRIT: CPT

## 2021-01-01 PROCEDURE — 84100 ASSAY OF PHOSPHORUS: CPT | Performed by: INTERNAL MEDICINE

## 2021-01-01 PROCEDURE — 84295 ASSAY OF SERUM SODIUM: CPT

## 2021-01-01 PROCEDURE — 94003 VENT MGMT INPAT SUBQ DAY: CPT

## 2021-01-01 PROCEDURE — 99222 1ST HOSP IP/OBS MODERATE 55: CPT | Performed by: SURGERY

## 2021-01-01 PROCEDURE — 94760 N-INVAS EAR/PLS OXIMETRY 1: CPT

## 2021-01-01 PROCEDURE — 99232 SBSQ HOSP IP/OBS MODERATE 35: CPT | Performed by: SURGERY

## 2021-01-01 PROCEDURE — 87186 SC STD MICRODIL/AGAR DIL: CPT | Performed by: SURGERY

## 2021-01-01 PROCEDURE — 85730 THROMBOPLASTIN TIME PARTIAL: CPT | Performed by: NURSE PRACTITIONER

## 2021-01-01 PROCEDURE — 84132 ASSAY OF SERUM POTASSIUM: CPT

## 2021-01-01 PROCEDURE — 85025 COMPLETE CBC W/AUTO DIFF WBC: CPT | Performed by: PHYSICIAN ASSISTANT

## 2021-01-01 PROCEDURE — 85027 COMPLETE CBC AUTOMATED: CPT | Performed by: NURSE PRACTITIONER

## 2021-01-01 PROCEDURE — 83735 ASSAY OF MAGNESIUM: CPT | Performed by: PHYSICIAN ASSISTANT

## 2021-01-01 PROCEDURE — 80048 BASIC METABOLIC PNL TOTAL CA: CPT | Performed by: PHYSICIAN ASSISTANT

## 2021-01-01 PROCEDURE — 86850 RBC ANTIBODY SCREEN: CPT | Performed by: NURSE PRACTITIONER

## 2021-01-01 PROCEDURE — 5A1D90Z PERFORMANCE OF URINARY FILTRATION, CONTINUOUS, GREATER THAN 18 HOURS PER DAY: ICD-10-PCS | Performed by: ANESTHESIOLOGY

## 2021-01-01 PROCEDURE — NC001 PR NO CHARGE: Performed by: SURGERY

## 2021-01-01 PROCEDURE — 85007 BL SMEAR W/DIFF WBC COUNT: CPT | Performed by: NURSE PRACTITIONER

## 2021-01-01 PROCEDURE — 99024 POSTOP FOLLOW-UP VISIT: CPT | Performed by: SURGERY

## 2021-01-01 PROCEDURE — 87116 MYCOBACTERIA CULTURE: CPT | Performed by: SURGERY

## 2021-01-01 PROCEDURE — 10061 I&D ABSCESS COMP/MULTIPLE: CPT | Performed by: SURGERY

## 2021-01-01 PROCEDURE — 87102 FUNGUS ISOLATION CULTURE: CPT | Performed by: SURGERY

## 2021-01-01 PROCEDURE — 0JB90ZZ EXCISION OF BUTTOCK SUBCUTANEOUS TISSUE AND FASCIA, OPEN APPROACH: ICD-10-PCS | Performed by: SURGERY

## 2021-01-01 PROCEDURE — 4A133B1 MONITORING OF ARTERIAL PRESSURE, PERIPHERAL, PERCUTANEOUS APPROACH: ICD-10-PCS | Performed by: ANESTHESIOLOGY

## 2021-01-01 PROCEDURE — 36620 INSERTION CATHETER ARTERY: CPT

## 2021-01-01 PROCEDURE — 99291 CRITICAL CARE FIRST HOUR: CPT | Performed by: ANESTHESIOLOGY

## 2021-01-01 PROCEDURE — 83735 ASSAY OF MAGNESIUM: CPT | Performed by: INTERNAL MEDICINE

## 2021-01-01 PROCEDURE — 82805 BLOOD GASES W/O2 SATURATION: CPT | Performed by: NURSE PRACTITIONER

## 2021-01-01 PROCEDURE — 90945 DIALYSIS ONE EVALUATION: CPT | Performed by: INTERNAL MEDICINE

## 2021-01-01 PROCEDURE — 86160 COMPLEMENT ANTIGEN: CPT | Performed by: INTERNAL MEDICINE

## 2021-01-01 PROCEDURE — 87040 BLOOD CULTURE FOR BACTERIA: CPT | Performed by: NURSE PRACTITIONER

## 2021-01-01 PROCEDURE — 85730 THROMBOPLASTIN TIME PARTIAL: CPT | Performed by: STUDENT IN AN ORGANIZED HEALTH CARE EDUCATION/TRAINING PROGRAM

## 2021-01-01 PROCEDURE — 80076 HEPATIC FUNCTION PANEL: CPT | Performed by: NURSE PRACTITIONER

## 2021-01-01 PROCEDURE — 4A133J1 MONITORING OF ARTERIAL PULSE, PERIPHERAL, PERCUTANEOUS APPROACH: ICD-10-PCS | Performed by: ANESTHESIOLOGY

## 2021-01-01 PROCEDURE — 02HV33Z INSERTION OF INFUSION DEVICE INTO SUPERIOR VENA CAVA, PERCUTANEOUS APPROACH: ICD-10-PCS | Performed by: ANESTHESIOLOGY

## 2021-01-01 PROCEDURE — 94002 VENT MGMT INPAT INIT DAY: CPT

## 2021-01-01 PROCEDURE — 84145 PROCALCITONIN (PCT): CPT | Performed by: NURSE PRACTITIONER

## 2021-01-01 PROCEDURE — 82805 BLOOD GASES W/O2 SATURATION: CPT | Performed by: PHYSICIAN ASSISTANT

## 2021-01-01 PROCEDURE — 84165 PROTEIN E-PHORESIS SERUM: CPT | Performed by: PATHOLOGY

## 2021-01-01 PROCEDURE — 84145 PROCALCITONIN (PCT): CPT | Performed by: PHYSICIAN ASSISTANT

## 2021-01-01 PROCEDURE — 83880 ASSAY OF NATRIURETIC PEPTIDE: CPT | Performed by: NURSE PRACTITIONER

## 2021-01-01 PROCEDURE — C9113 INJ PANTOPRAZOLE SODIUM, VIA: HCPCS | Performed by: SURGERY

## 2021-01-01 PROCEDURE — 82330 ASSAY OF CALCIUM: CPT | Performed by: INTERNAL MEDICINE

## 2021-01-01 PROCEDURE — 85610 PROTHROMBIN TIME: CPT | Performed by: NURSE PRACTITIONER

## 2021-01-01 PROCEDURE — 83036 HEMOGLOBIN GLYCOSYLATED A1C: CPT | Performed by: NURSE PRACTITIONER

## 2021-01-01 PROCEDURE — 83605 ASSAY OF LACTIC ACID: CPT | Performed by: PHYSICIAN ASSISTANT

## 2021-01-01 PROCEDURE — 82805 BLOOD GASES W/O2 SATURATION: CPT | Performed by: SURGERY

## 2021-01-01 PROCEDURE — 87206 SMEAR FLUORESCENT/ACID STAI: CPT | Performed by: SURGERY

## 2021-01-01 PROCEDURE — 87176 TISSUE HOMOGENIZATION CULTR: CPT | Performed by: SURGERY

## 2021-01-01 PROCEDURE — 5A1945Z RESPIRATORY VENTILATION, 24-96 CONSECUTIVE HOURS: ICD-10-PCS | Performed by: ANESTHESIOLOGY

## 2021-01-01 PROCEDURE — 80053 COMPREHEN METABOLIC PANEL: CPT | Performed by: NURSE PRACTITIONER

## 2021-01-01 PROCEDURE — 93010 ELECTROCARDIOGRAM REPORT: CPT | Performed by: INTERNAL MEDICINE

## 2021-01-01 PROCEDURE — 86900 BLOOD TYPING SEROLOGIC ABO: CPT | Performed by: NURSE PRACTITIONER

## 2021-01-01 PROCEDURE — 99221 1ST HOSP IP/OBS SF/LOW 40: CPT | Performed by: SURGERY

## 2021-01-01 PROCEDURE — NC001 PR NO CHARGE: Performed by: NURSE PRACTITIONER

## 2021-01-01 RX ORDER — FENTANYL CITRATE 50 UG/ML
50 INJECTION, SOLUTION INTRAMUSCULAR; INTRAVENOUS
Status: DISCONTINUED | OUTPATIENT
Start: 2021-01-01 | End: 2021-01-01

## 2021-01-01 RX ORDER — SODIUM CHLORIDE 9 MG/ML
5 INJECTION, SOLUTION INTRAVENOUS CONTINUOUS
Status: DISCONTINUED | OUTPATIENT
Start: 2021-01-01 | End: 2021-01-01

## 2021-01-01 RX ORDER — BUMETANIDE 0.25 MG/ML
4 INJECTION INTRAMUSCULAR; INTRAVENOUS CONTINUOUS
Status: DISCONTINUED | OUTPATIENT
Start: 2021-01-01 | End: 2021-01-01

## 2021-01-01 RX ORDER — GLYCOPYRROLATE 0.2 MG/ML
0.2 INJECTION INTRAMUSCULAR; INTRAVENOUS
Status: DISCONTINUED | OUTPATIENT
Start: 2021-01-01 | End: 2021-01-01 | Stop reason: HOSPADM

## 2021-01-01 RX ORDER — VANCOMYCIN HYDROCHLORIDE 1 G/200ML
12.5 INJECTION, SOLUTION INTRAVENOUS EVERY 24 HOURS
Status: DISCONTINUED | OUTPATIENT
Start: 2021-01-01 | End: 2021-01-01

## 2021-01-01 RX ORDER — FENTANYL CITRATE-0.9 % NACL/PF 10 MCG/ML
125 PLASTIC BAG, INJECTION (ML) INTRAVENOUS CONTINUOUS
Status: DISCONTINUED | OUTPATIENT
Start: 2021-01-01 | End: 2021-01-01 | Stop reason: HOSPADM

## 2021-01-01 RX ORDER — OLANZAPINE 10 MG/1
10 TABLET, ORALLY DISINTEGRATING ORAL
Status: DISCONTINUED | OUTPATIENT
Start: 2021-01-01 | End: 2021-01-01

## 2021-01-01 RX ORDER — FUROSEMIDE 10 MG/ML
30 SYRINGE (ML) INJECTION CONTINUOUS
Status: DISCONTINUED | OUTPATIENT
Start: 2021-01-01 | End: 2021-01-01

## 2021-01-01 RX ORDER — ALBUMIN, HUMAN INJ 5% 5 %
SOLUTION INTRAVENOUS CONTINUOUS PRN
Status: DISCONTINUED | OUTPATIENT
Start: 2021-01-01 | End: 2021-01-01

## 2021-01-01 RX ORDER — HEPARIN SODIUM 1000 [USP'U]/ML
4000 INJECTION, SOLUTION INTRAVENOUS; SUBCUTANEOUS
Status: DISCONTINUED | OUTPATIENT
Start: 2021-01-01 | End: 2021-01-01

## 2021-01-01 RX ORDER — ALBUMIN, HUMAN INJ 5% 5 %
12.5 SOLUTION INTRAVENOUS ONCE
Status: COMPLETED | OUTPATIENT
Start: 2021-01-01 | End: 2021-01-01

## 2021-01-01 RX ORDER — ALBUMIN, HUMAN INJ 5% 5 %
12.5 SOLUTION INTRAVENOUS ONCE
Status: DISCONTINUED | OUTPATIENT
Start: 2021-01-01 | End: 2021-01-01

## 2021-01-01 RX ORDER — SODIUM CHLORIDE, SODIUM GLUCONATE, SODIUM ACETATE, POTASSIUM CHLORIDE, MAGNESIUM CHLORIDE, SODIUM PHOSPHATE, DIBASIC, AND POTASSIUM PHOSPHATE .53; .5; .37; .037; .03; .012; .00082 G/100ML; G/100ML; G/100ML; G/100ML; G/100ML; G/100ML; G/100ML
500 INJECTION, SOLUTION INTRAVENOUS ONCE
Status: COMPLETED | OUTPATIENT
Start: 2021-01-01 | End: 2021-01-01

## 2021-01-01 RX ORDER — SODIUM CHLORIDE 9 MG/ML
INJECTION, SOLUTION INTRAVENOUS CONTINUOUS PRN
Status: DISCONTINUED | OUTPATIENT
Start: 2021-01-01 | End: 2021-01-01

## 2021-01-01 RX ORDER — METOLAZONE 5 MG/1
10 TABLET ORAL DAILY
Status: DISCONTINUED | OUTPATIENT
Start: 2021-01-01 | End: 2021-01-01

## 2021-01-01 RX ORDER — POLYETHYLENE GLYCOL 3350 17 G/17G
17 POWDER, FOR SOLUTION ORAL DAILY PRN
Status: DISCONTINUED | OUTPATIENT
Start: 2021-01-01 | End: 2021-01-01

## 2021-01-01 RX ORDER — FAMOTIDINE 40 MG/5ML
20 POWDER, FOR SUSPENSION ORAL DAILY
Status: DISCONTINUED | OUTPATIENT
Start: 2021-01-01 | End: 2021-01-01

## 2021-01-01 RX ORDER — FENTANYL CITRATE 50 UG/ML
INJECTION, SOLUTION INTRAMUSCULAR; INTRAVENOUS
Status: COMPLETED
Start: 2021-01-01 | End: 2021-01-01

## 2021-01-01 RX ORDER — ETOMIDATE 2 MG/ML
16 INJECTION INTRAVENOUS ONCE
Status: COMPLETED | OUTPATIENT
Start: 2021-01-01 | End: 2021-01-01

## 2021-01-01 RX ORDER — POTASSIUM CHLORIDE 14.9 MG/ML
20 INJECTION INTRAVENOUS ONCE
Status: COMPLETED | OUTPATIENT
Start: 2021-01-01 | End: 2021-01-01

## 2021-01-01 RX ORDER — ROCURONIUM BROMIDE 10 MG/ML
INJECTION, SOLUTION INTRAVENOUS AS NEEDED
Status: DISCONTINUED | OUTPATIENT
Start: 2021-01-01 | End: 2021-01-01

## 2021-01-01 RX ORDER — CHLORHEXIDINE GLUCONATE 0.12 MG/ML
15 RINSE ORAL EVERY 12 HOURS SCHEDULED
Status: DISCONTINUED | OUTPATIENT
Start: 2021-01-01 | End: 2021-01-01

## 2021-01-01 RX ORDER — ACETAMINOPHEN 325 MG/1
975 TABLET ORAL EVERY 8 HOURS PRN
Status: DISCONTINUED | OUTPATIENT
Start: 2021-01-01 | End: 2021-01-01

## 2021-01-01 RX ORDER — LORAZEPAM 2 MG/ML
0.5 INJECTION INTRAMUSCULAR
Status: DISCONTINUED | OUTPATIENT
Start: 2021-01-01 | End: 2021-01-01 | Stop reason: HOSPADM

## 2021-01-01 RX ORDER — ATORVASTATIN CALCIUM 40 MG/1
40 TABLET, FILM COATED ORAL
Status: DISCONTINUED | OUTPATIENT
Start: 2021-01-01 | End: 2021-01-01

## 2021-01-01 RX ORDER — POTASSIUM CHLORIDE 29.8 MG/ML
40 INJECTION INTRAVENOUS ONCE
Status: COMPLETED | OUTPATIENT
Start: 2021-01-01 | End: 2021-01-01

## 2021-01-01 RX ORDER — PROPOFOL 10 MG/ML
INJECTION, EMULSION INTRAVENOUS
Status: DISPENSED
Start: 2021-01-01 | End: 2021-01-01

## 2021-01-01 RX ORDER — MAGNESIUM HYDROXIDE 1200 MG/15ML
LIQUID ORAL AS NEEDED
Status: DISCONTINUED | OUTPATIENT
Start: 2021-01-01 | End: 2021-01-01 | Stop reason: HOSPADM

## 2021-01-01 RX ORDER — MILRINONE LACTATE 0.2 MG/ML
0.13 INJECTION, SOLUTION INTRAVENOUS CONTINUOUS
Status: DISCONTINUED | OUTPATIENT
Start: 2021-01-01 | End: 2021-01-01

## 2021-01-01 RX ORDER — ALBUMIN, HUMAN INJ 5% 5 %
SOLUTION INTRAVENOUS
Status: COMPLETED
Start: 2021-01-01 | End: 2021-01-01

## 2021-01-01 RX ORDER — FUROSEMIDE 10 MG/ML
80 INJECTION INTRAMUSCULAR; INTRAVENOUS ONCE
Status: COMPLETED | OUTPATIENT
Start: 2021-01-01 | End: 2021-01-01

## 2021-01-01 RX ORDER — MAGNESIUM SULFATE HEPTAHYDRATE 40 MG/ML
4 INJECTION, SOLUTION INTRAVENOUS ONCE
Status: COMPLETED | OUTPATIENT
Start: 2021-01-01 | End: 2021-01-01

## 2021-01-01 RX ORDER — SUCCINYLCHOLINE/SOD CL,ISO/PF 100 MG/5ML
100 SYRINGE (ML) INTRAVENOUS ONCE
Status: COMPLETED | OUTPATIENT
Start: 2021-01-01 | End: 2021-01-01

## 2021-01-01 RX ORDER — PANTOPRAZOLE SODIUM 40 MG/1
40 INJECTION, POWDER, FOR SOLUTION INTRAVENOUS EVERY 12 HOURS SCHEDULED
Status: DISCONTINUED | OUTPATIENT
Start: 2021-01-01 | End: 2021-01-01

## 2021-01-01 RX ORDER — HEPARIN SODIUM 1000 [USP'U]/ML
2000 INJECTION, SOLUTION INTRAVENOUS; SUBCUTANEOUS
Status: DISCONTINUED | OUTPATIENT
Start: 2021-01-01 | End: 2021-01-01

## 2021-01-01 RX ORDER — MILRINONE LACTATE 0.2 MG/ML
0.25 INJECTION, SOLUTION INTRAVENOUS CONTINUOUS
Status: DISCONTINUED | OUTPATIENT
Start: 2021-01-01 | End: 2021-01-01

## 2021-01-01 RX ORDER — CALCIUM GLUCONATE 20 MG/ML
2 INJECTION, SOLUTION INTRAVENOUS ONCE
Status: COMPLETED | OUTPATIENT
Start: 2021-01-01 | End: 2021-01-01

## 2021-01-01 RX ORDER — CALCIUM GLUCONATE 20 MG/ML
1 INJECTION, SOLUTION INTRAVENOUS ONCE
Status: COMPLETED | OUTPATIENT
Start: 2021-01-01 | End: 2021-01-01

## 2021-01-01 RX ORDER — SODIUM CHLORIDE, SODIUM GLUCONATE, SODIUM ACETATE, POTASSIUM CHLORIDE, MAGNESIUM CHLORIDE, SODIUM PHOSPHATE, DIBASIC, AND POTASSIUM PHOSPHATE .53; .5; .37; .037; .03; .012; .00082 G/100ML; G/100ML; G/100ML; G/100ML; G/100ML; G/100ML; G/100ML
100 INJECTION, SOLUTION INTRAVENOUS CONTINUOUS
Status: DISCONTINUED | OUTPATIENT
Start: 2021-01-01 | End: 2021-01-01

## 2021-01-01 RX ORDER — LANOLIN ALCOHOL/MO/W.PET/CERES
6 CREAM (GRAM) TOPICAL
Status: DISCONTINUED | OUTPATIENT
Start: 2021-01-01 | End: 2021-01-01

## 2021-01-01 RX ORDER — FENTANYL CITRATE 50 UG/ML
125 INJECTION, SOLUTION INTRAMUSCULAR; INTRAVENOUS
Status: DISCONTINUED | OUTPATIENT
Start: 2021-01-01 | End: 2021-01-01

## 2021-01-01 RX ORDER — HEPARIN SODIUM 10000 [USP'U]/100ML
3-20 INJECTION, SOLUTION INTRAVENOUS
Status: DISCONTINUED | OUTPATIENT
Start: 2021-01-01 | End: 2021-01-01

## 2021-01-01 RX ORDER — FUROSEMIDE 10 MG/ML
40 INJECTION INTRAMUSCULAR; INTRAVENOUS ONCE
Status: COMPLETED | OUTPATIENT
Start: 2021-01-01 | End: 2021-01-01

## 2021-01-01 RX ORDER — BUMETANIDE 0.25 MG/ML
4 INJECTION, SOLUTION INTRAMUSCULAR; INTRAVENOUS ONCE
Status: COMPLETED | OUTPATIENT
Start: 2021-01-01 | End: 2021-01-01

## 2021-01-01 RX ORDER — LIDOCAINE HYDROCHLORIDE 10 MG/ML
INJECTION, SOLUTION EPIDURAL; INFILTRATION; INTRACAUDAL; PERINEURAL
Status: COMPLETED
Start: 2021-01-01 | End: 2021-01-01

## 2021-01-01 RX ORDER — CLINDAMYCIN PHOSPHATE 600 MG/50ML
600 INJECTION INTRAVENOUS EVERY 6 HOURS
Status: DISCONTINUED | OUTPATIENT
Start: 2021-01-01 | End: 2021-01-01

## 2021-01-01 RX ORDER — POTASSIUM CHLORIDE 20 MEQ/1
40 TABLET, EXTENDED RELEASE ORAL ONCE
Status: COMPLETED | OUTPATIENT
Start: 2021-01-01 | End: 2021-01-01

## 2021-01-01 RX ORDER — PROPOFOL 10 MG/ML
5-50 INJECTION, EMULSION INTRAVENOUS
Status: DISCONTINUED | OUTPATIENT
Start: 2021-01-01 | End: 2021-01-01

## 2021-01-01 RX ORDER — MAGNESIUM SULFATE HEPTAHYDRATE 40 MG/ML
2 INJECTION, SOLUTION INTRAVENOUS ONCE
Status: COMPLETED | OUTPATIENT
Start: 2021-01-01 | End: 2021-01-01

## 2021-01-01 RX ORDER — AMOXICILLIN 250 MG
1 CAPSULE ORAL
Status: DISCONTINUED | OUTPATIENT
Start: 2021-01-01 | End: 2021-01-01

## 2021-01-01 RX ORDER — CALCIUM CHLORIDE 100 MG/ML
1 INJECTION INTRAVENOUS; INTRAVENTRICULAR ONCE
Status: COMPLETED | OUTPATIENT
Start: 2021-01-01 | End: 2021-01-01

## 2021-01-01 RX ORDER — FENTANYL CITRATE 50 UG/ML
50 INJECTION, SOLUTION INTRAMUSCULAR; INTRAVENOUS
Status: DISCONTINUED | OUTPATIENT
Start: 2021-01-01 | End: 2021-01-01 | Stop reason: HOSPADM

## 2021-01-01 RX ADMIN — POTASSIUM CHLORIDE 20 MEQ: 14.9 INJECTION, SOLUTION INTRAVENOUS at 21:46

## 2021-01-01 RX ADMIN — FENTANYL CITRATE 50 MCG: 50 INJECTION INTRAMUSCULAR; INTRAVENOUS at 12:29

## 2021-01-01 RX ADMIN — Medication 75 MCG/HR: at 09:31

## 2021-01-01 RX ADMIN — PHENYLEPHRINE HYDROCHLORIDE 100 MCG: 10 INJECTION INTRAVENOUS at 07:49

## 2021-01-01 RX ADMIN — CHLORHEXIDINE GLUCONATE 15 ML: 1.2 RINSE ORAL at 02:23

## 2021-01-01 RX ADMIN — INSULIN LISPRO 6 UNITS: 100 INJECTION, SOLUTION INTRAVENOUS; SUBCUTANEOUS at 17:59

## 2021-01-01 RX ADMIN — PROPOFOL 15 MCG/KG/MIN: 10 INJECTION, EMULSION INTRAVENOUS at 09:07

## 2021-01-01 RX ADMIN — SODIUM CHLORIDE: 0.9 INJECTION, SOLUTION INTRAVENOUS at 11:42

## 2021-01-01 RX ADMIN — METRONIDAZOLE 500 MG: 500 INJECTION, SOLUTION INTRAVENOUS at 17:37

## 2021-01-01 RX ADMIN — SODIUM CHLORIDE 1 UNITS/HR: 9 INJECTION, SOLUTION INTRAVENOUS at 13:14

## 2021-01-01 RX ADMIN — PHENYLEPHRINE HYDROCHLORIDE 100 MCG: 10 INJECTION INTRAVENOUS at 08:05

## 2021-01-01 RX ADMIN — FENTANYL CITRATE 50 MCG: 50 INJECTION INTRAMUSCULAR; INTRAVENOUS at 03:11

## 2021-01-01 RX ADMIN — ALBUMIN (HUMAN) 12.5 G: 12.5 INJECTION, SOLUTION INTRAVENOUS at 11:56

## 2021-01-01 RX ADMIN — METRONIDAZOLE 500 MG: 500 INJECTION, SOLUTION INTRAVENOUS at 02:49

## 2021-01-01 RX ADMIN — Medication 20 MG/HR: at 04:06

## 2021-01-01 RX ADMIN — PANTOPRAZOLE SODIUM 40 MG: 40 INJECTION, POWDER, FOR SOLUTION INTRAVENOUS at 20:20

## 2021-01-01 RX ADMIN — Medication 20000 ML: at 20:55

## 2021-01-01 RX ADMIN — FUROSEMIDE 80 MG: 10 INJECTION, SOLUTION INTRAMUSCULAR; INTRAVENOUS at 04:05

## 2021-01-01 RX ADMIN — CHLORHEXIDINE GLUCONATE 15 ML: 1.2 RINSE ORAL at 08:07

## 2021-01-01 RX ADMIN — INSULIN LISPRO 3 UNITS: 100 INJECTION, SOLUTION INTRAVENOUS; SUBCUTANEOUS at 17:55

## 2021-01-01 RX ADMIN — MAGNESIUM SULFATE HEPTAHYDRATE 4 G: 40 INJECTION, SOLUTION INTRAVENOUS at 04:03

## 2021-01-01 RX ADMIN — POTASSIUM CHLORIDE 40 MEQ: 29.8 INJECTION, SOLUTION INTRAVENOUS at 20:56

## 2021-01-01 RX ADMIN — VASOPRESSIN 0.04 UNITS/MIN: 20 INJECTION INTRAVENOUS at 02:09

## 2021-01-01 RX ADMIN — CLINDAMYCIN IN 5 PERCENT DEXTROSE 600 MG: 12 INJECTION, SOLUTION INTRAVENOUS at 09:43

## 2021-01-01 RX ADMIN — PROPOFOL 20 MCG/KG/MIN: 10 INJECTION, EMULSION INTRAVENOUS at 17:04

## 2021-01-01 RX ADMIN — METRONIDAZOLE 500 MG: 500 INJECTION, SOLUTION INTRAVENOUS at 09:36

## 2021-01-01 RX ADMIN — PHENYLEPHRINE HYDROCHLORIDE 100 MCG: 10 INJECTION INTRAVENOUS at 07:55

## 2021-01-01 RX ADMIN — Medication 50 MEQ: at 23:35

## 2021-01-01 RX ADMIN — PHENYLEPHRINE HYDROCHLORIDE 100 MCG: 10 INJECTION INTRAVENOUS at 07:57

## 2021-01-01 RX ADMIN — CEFEPIME HYDROCHLORIDE 1000 MG: 1 INJECTION, POWDER, FOR SOLUTION INTRAMUSCULAR; INTRAVENOUS at 16:30

## 2021-01-01 RX ADMIN — HYDROCORTISONE SODIUM SUCCINATE 50 MG: 100 INJECTION, POWDER, FOR SOLUTION INTRAMUSCULAR; INTRAVENOUS at 14:04

## 2021-01-01 RX ADMIN — SODIUM CHLORIDE 2 UNITS/HR: 9 INJECTION, SOLUTION INTRAVENOUS at 16:15

## 2021-01-01 RX ADMIN — NOREPINEPHRINE BITARTRATE 23 MCG/MIN: 1 INJECTION, SOLUTION, CONCENTRATE INTRAVENOUS at 01:11

## 2021-01-01 RX ADMIN — VASOPRESSIN 0.04 UNITS/MIN: 20 INJECTION INTRAVENOUS at 03:51

## 2021-01-01 RX ADMIN — AMIODARONE HYDROCHLORIDE 1 MG/MIN: 50 INJECTION, SOLUTION INTRAVENOUS at 05:42

## 2021-01-01 RX ADMIN — VASOPRESSIN 0.04 UNITS/MIN: 20 INJECTION INTRAVENOUS at 19:23

## 2021-01-01 RX ADMIN — CEFEPIME HYDROCHLORIDE 1000 MG: 1 INJECTION, POWDER, FOR SOLUTION INTRAMUSCULAR; INTRAVENOUS at 03:44

## 2021-01-01 RX ADMIN — SODIUM CHLORIDE 80 MG: 9 INJECTION, SOLUTION INTRAVENOUS at 03:33

## 2021-01-01 RX ADMIN — VASOPRESSIN 0.04 UNITS/MIN: 20 INJECTION INTRAVENOUS at 20:09

## 2021-01-01 RX ADMIN — FENTANYL CITRATE 50 MCG: 50 INJECTION INTRAMUSCULAR; INTRAVENOUS at 14:55

## 2021-01-01 RX ADMIN — VASOPRESSIN 0.04 UNITS/MIN: 20 INJECTION INTRAVENOUS at 04:07

## 2021-01-01 RX ADMIN — VASOPRESSIN 0.04 UNITS/MIN: 20 INJECTION INTRAVENOUS at 10:06

## 2021-01-01 RX ADMIN — CHLORHEXIDINE GLUCONATE 15 ML: 1.2 RINSE ORAL at 20:20

## 2021-01-01 RX ADMIN — INSULIN LISPRO 8 UNITS: 100 INJECTION, SOLUTION INTRAVENOUS; SUBCUTANEOUS at 06:19

## 2021-01-01 RX ADMIN — SODIUM CHLORIDE, SODIUM GLUCONATE, SODIUM ACETATE, POTASSIUM CHLORIDE, MAGNESIUM CHLORIDE, SODIUM PHOSPHATE, DIBASIC, AND POTASSIUM PHOSPHATE 500 ML: .53; .5; .37; .037; .03; .012; .00082 INJECTION, SOLUTION INTRAVENOUS at 03:08

## 2021-01-01 RX ADMIN — HYDROCORTISONE SODIUM SUCCINATE 100 MG: 100 INJECTION, POWDER, FOR SOLUTION INTRAMUSCULAR; INTRAVENOUS at 02:18

## 2021-01-01 RX ADMIN — HYDROCORTISONE SODIUM SUCCINATE 50 MG: 100 INJECTION, POWDER, FOR SOLUTION INTRAMUSCULAR; INTRAVENOUS at 08:44

## 2021-01-01 RX ADMIN — CALCIUM GLUCONATE 1 G: 20 INJECTION, SOLUTION INTRAVENOUS at 10:15

## 2021-01-01 RX ADMIN — HYDROCORTISONE SODIUM SUCCINATE 50 MG: 100 INJECTION, POWDER, FOR SOLUTION INTRAMUSCULAR; INTRAVENOUS at 08:07

## 2021-01-01 RX ADMIN — VASOPRESSIN 0.04 UNITS/MIN: 20 INJECTION INTRAVENOUS at 01:00

## 2021-01-01 RX ADMIN — FUROSEMIDE 40 MG: 10 INJECTION, SOLUTION INTRAMUSCULAR; INTRAVENOUS at 00:36

## 2021-01-01 RX ADMIN — SODIUM PHOSPHATE, MONOBASIC, MONOHYDRATE 6 MMOL: 276; 142 INJECTION, SOLUTION INTRAVENOUS at 11:12

## 2021-01-01 RX ADMIN — GLYCOPYRROLATE 0.2 MG: 0.2 INJECTION, SOLUTION INTRAMUSCULAR; INTRAVENOUS at 14:22

## 2021-01-01 RX ADMIN — FENTANYL CITRATE 100 MCG: 50 INJECTION, SOLUTION INTRAMUSCULAR; INTRAVENOUS at 09:08

## 2021-01-01 RX ADMIN — SODIUM BICARBONATE 50 MEQ: 84 INJECTION, SOLUTION INTRAVENOUS at 08:15

## 2021-01-01 RX ADMIN — CHLORHEXIDINE GLUCONATE 15 ML: 1.2 RINSE ORAL at 20:54

## 2021-01-01 RX ADMIN — EPINEPHRINE 4 MCG/MIN: 1 INJECTION, SOLUTION, CONCENTRATE INTRAVENOUS at 03:46

## 2021-01-01 RX ADMIN — SODIUM CHLORIDE, SODIUM GLUCONATE, SODIUM ACETATE, POTASSIUM CHLORIDE, MAGNESIUM CHLORIDE, SODIUM PHOSPHATE, DIBASIC, AND POTASSIUM PHOSPHATE 100 ML/HR: .53; .5; .37; .037; .03; .012; .00082 INJECTION, SOLUTION INTRAVENOUS at 09:28

## 2021-01-01 RX ADMIN — SODIUM CHLORIDE 5 ML/HR: 0.9 INJECTION, SOLUTION INTRAVENOUS at 03:20

## 2021-01-01 RX ADMIN — METRONIDAZOLE 500 MG: 500 INJECTION, SOLUTION INTRAVENOUS at 17:53

## 2021-01-01 RX ADMIN — METOLAZONE 10 MG: 5 TABLET ORAL at 09:34

## 2021-01-01 RX ADMIN — MILRINONE LACTATE IN DEXTROSE 0.13 MCG/KG/MIN: 200 INJECTION, SOLUTION INTRAVENOUS at 10:30

## 2021-01-01 RX ADMIN — Medication 75 MCG/HR: at 17:38

## 2021-01-01 RX ADMIN — Medication 75 MCG/HR: at 05:19

## 2021-01-01 RX ADMIN — CLINDAMYCIN IN 5 PERCENT DEXTROSE 600 MG: 12 INJECTION, SOLUTION INTRAVENOUS at 02:51

## 2021-01-01 RX ADMIN — EPINEPHRINE 2 MCG/MIN: 1 INJECTION, SOLUTION, CONCENTRATE INTRAVENOUS at 15:40

## 2021-01-01 RX ADMIN — FENTANYL CITRATE 50 MCG: 50 INJECTION INTRAMUSCULAR; INTRAVENOUS at 14:27

## 2021-01-01 RX ADMIN — PHENYLEPHRINE HYDROCHLORIDE 100 MCG: 10 INJECTION INTRAVENOUS at 07:51

## 2021-01-01 RX ADMIN — EPINEPHRINE 4 MCG/MIN: 1 INJECTION, SOLUTION, CONCENTRATE INTRAVENOUS at 01:12

## 2021-01-01 RX ADMIN — NOREPINEPHRINE BITARTRATE 28 MCG/MIN: 1 INJECTION, SOLUTION, CONCENTRATE INTRAVENOUS at 05:31

## 2021-01-01 RX ADMIN — INSULIN LISPRO 4 UNITS: 100 INJECTION, SOLUTION INTRAVENOUS; SUBCUTANEOUS at 00:15

## 2021-01-01 RX ADMIN — HEPARIN SODIUM 2000 UNITS: 1000 INJECTION INTRAVENOUS; SUBCUTANEOUS at 11:52

## 2021-01-01 RX ADMIN — MILRINONE LACTATE IN DEXTROSE 0.13 MCG/KG/MIN: 200 INJECTION, SOLUTION INTRAVENOUS at 16:36

## 2021-01-01 RX ADMIN — CHLORHEXIDINE GLUCONATE 15 ML: 1.2 RINSE ORAL at 10:01

## 2021-01-01 RX ADMIN — Medication 4 MG/HR: at 16:58

## 2021-01-01 RX ADMIN — HYDROCORTISONE SODIUM SUCCINATE 50 MG: 100 INJECTION, POWDER, FOR SOLUTION INTRAMUSCULAR; INTRAVENOUS at 20:11

## 2021-01-01 RX ADMIN — NOREPINEPHRINE BITARTRATE 22 MCG/MIN: 1 INJECTION, SOLUTION, CONCENTRATE INTRAVENOUS at 15:37

## 2021-01-01 RX ADMIN — CALCIUM GLUCONATE 2 G: 20 INJECTION, SOLUTION INTRAVENOUS at 08:39

## 2021-01-01 RX ADMIN — VASOPRESSIN 0.04 UNITS/MIN: 20 INJECTION INTRAVENOUS at 17:04

## 2021-01-01 RX ADMIN — HEPARIN SODIUM 2000 UNITS: 1000 INJECTION INTRAVENOUS; SUBCUTANEOUS at 00:27

## 2021-01-01 RX ADMIN — Medication 4 MG/HR: at 20:06

## 2021-01-01 RX ADMIN — VANCOMYCIN HYDROCHLORIDE 1250 MG: 1 INJECTION, POWDER, LYOPHILIZED, FOR SOLUTION INTRAVENOUS at 14:04

## 2021-01-01 RX ADMIN — Medication 50 MCG/HR: at 20:53

## 2021-01-01 RX ADMIN — PANTOPRAZOLE SODIUM 40 MG: 40 INJECTION, POWDER, FOR SOLUTION INTRAVENOUS at 08:07

## 2021-01-01 RX ADMIN — SODIUM CHLORIDE: 9 INJECTION, SOLUTION INTRAVENOUS at 07:59

## 2021-01-01 RX ADMIN — HYDROCORTISONE SODIUM SUCCINATE 50 MG: 100 INJECTION, POWDER, FOR SOLUTION INTRAMUSCULAR; INTRAVENOUS at 09:54

## 2021-01-01 RX ADMIN — NOREPINEPHRINE BITARTRATE 22 MCG/MIN: 1 INJECTION, SOLUTION, CONCENTRATE INTRAVENOUS at 01:10

## 2021-01-01 RX ADMIN — AMIODARONE HYDROCHLORIDE 1 MG/MIN: 50 INJECTION, SOLUTION INTRAVENOUS at 10:48

## 2021-01-01 RX ADMIN — HEPARIN SODIUM 2000 UNITS: 1000 INJECTION INTRAVENOUS; SUBCUTANEOUS at 18:37

## 2021-01-01 RX ADMIN — CEFEPIME HYDROCHLORIDE 1000 MG: 1 INJECTION, POWDER, FOR SOLUTION INTRAMUSCULAR; INTRAVENOUS at 02:50

## 2021-01-01 RX ADMIN — LIDOCAINE HYDROCHLORIDE 50 MG: 10 INJECTION, SOLUTION EPIDURAL; INFILTRATION; INTRACAUDAL at 03:07

## 2021-01-01 RX ADMIN — SODIUM CHLORIDE 0.2 MCG/KG/HR: 9 INJECTION, SOLUTION INTRAVENOUS at 20:52

## 2021-01-01 RX ADMIN — CALCIUM GLUCONATE 1 G: 20 INJECTION, SOLUTION INTRAVENOUS at 23:45

## 2021-01-01 RX ADMIN — PHENYLEPHRINE HYDROCHLORIDE 100 MCG: 10 INJECTION INTRAVENOUS at 08:08

## 2021-01-01 RX ADMIN — FENTANYL CITRATE 50 MCG: 50 INJECTION INTRAMUSCULAR; INTRAVENOUS at 22:56

## 2021-01-01 RX ADMIN — MAGNESIUM SULFATE HEPTAHYDRATE 2 G: 40 INJECTION, SOLUTION INTRAVENOUS at 05:12

## 2021-01-01 RX ADMIN — INSULIN LISPRO 2 UNITS: 100 INJECTION, SOLUTION INTRAVENOUS; SUBCUTANEOUS at 23:49

## 2021-01-01 RX ADMIN — HEPARIN SODIUM 12 UNITS/KG/HR: 10000 INJECTION, SOLUTION INTRAVENOUS at 02:22

## 2021-01-01 RX ADMIN — METRONIDAZOLE 500 MG: 500 INJECTION, SOLUTION INTRAVENOUS at 09:26

## 2021-01-01 RX ADMIN — ALBUMIN (HUMAN) 12.5 G: 12.5 INJECTION, SOLUTION INTRAVENOUS at 10:15

## 2021-01-01 RX ADMIN — BUMETANIDE 4 MG: 0.25 INJECTION INTRAMUSCULAR; INTRAVENOUS at 09:33

## 2021-01-01 RX ADMIN — NOREPINEPHRINE BITARTRATE 26 MCG/MIN: 1 INJECTION, SOLUTION, CONCENTRATE INTRAVENOUS at 12:31

## 2021-01-01 RX ADMIN — METRONIDAZOLE 500 MG: 500 INJECTION, SOLUTION INTRAVENOUS at 01:32

## 2021-01-01 RX ADMIN — HEPARIN SODIUM 20 UNITS/KG/HR: 10000 INJECTION, SOLUTION INTRAVENOUS at 17:36

## 2021-01-01 RX ADMIN — ROCURONIUM BROMIDE 50 MG: 50 INJECTION, SOLUTION INTRAVENOUS at 07:59

## 2021-01-01 RX ADMIN — METRONIDAZOLE 500 MG: 500 INJECTION, SOLUTION INTRAVENOUS at 02:22

## 2021-01-01 RX ADMIN — INSULIN LISPRO 3 UNITS: 100 INJECTION, SOLUTION INTRAVENOUS; SUBCUTANEOUS at 12:33

## 2021-01-01 RX ADMIN — SODIUM BICARBONATE 100 ML/HR: 84 INJECTION, SOLUTION INTRAVENOUS at 09:11

## 2021-01-01 RX ADMIN — NOREPINEPHRINE BITARTRATE 13 MCG/MIN: 1 INJECTION, SOLUTION, CONCENTRATE INTRAVENOUS at 21:47

## 2021-01-01 RX ADMIN — AMIODARONE HYDROCHLORIDE 1 MG/MIN: 50 INJECTION, SOLUTION INTRAVENOUS at 22:51

## 2021-01-01 RX ADMIN — CEFEPIME HYDROCHLORIDE 1000 MG: 1 INJECTION, POWDER, FOR SOLUTION INTRAMUSCULAR; INTRAVENOUS at 03:01

## 2021-01-01 RX ADMIN — Medication 75 MCG/HR: at 04:02

## 2021-01-01 RX ADMIN — HYDROCORTISONE SODIUM SUCCINATE 50 MG: 100 INJECTION, POWDER, FOR SOLUTION INTRAMUSCULAR; INTRAVENOUS at 14:35

## 2021-01-01 RX ADMIN — CHLORHEXIDINE GLUCONATE 15 ML: 1.2 RINSE ORAL at 09:36

## 2021-01-01 RX ADMIN — PHENYLEPHRINE HYDROCHLORIDE 100 MCG: 10 INJECTION INTRAVENOUS at 08:10

## 2021-01-01 RX ADMIN — Medication 50 MEQ: at 18:36

## 2021-01-01 RX ADMIN — NOREPINEPHRINE BITARTRATE 20 MCG/MIN: 1 INJECTION, SOLUTION, CONCENTRATE INTRAVENOUS at 19:02

## 2021-01-01 RX ADMIN — MILRINONE LACTATE IN DEXTROSE 0.25 MCG/KG/MIN: 200 INJECTION, SOLUTION INTRAVENOUS at 16:12

## 2021-01-01 RX ADMIN — METRONIDAZOLE 500 MG: 500 INJECTION, SOLUTION INTRAVENOUS at 01:59

## 2021-01-01 RX ADMIN — ALBUMIN (HUMAN) 12.5 G: 12.5 INJECTION, SOLUTION INTRAVENOUS at 14:21

## 2021-01-01 RX ADMIN — MELATONIN TAB 3 MG 6 MG: 3 TAB at 21:47

## 2021-01-01 RX ADMIN — VASOPRESSIN: 20 INJECTION INTRAVENOUS at 12:12

## 2021-01-01 RX ADMIN — METRONIDAZOLE 500 MG: 500 INJECTION, SOLUTION INTRAVENOUS at 10:31

## 2021-01-01 RX ADMIN — POTASSIUM CHLORIDE 40 MEQ: 29.8 INJECTION, SOLUTION INTRAVENOUS at 00:05

## 2021-01-01 RX ADMIN — CHLORHEXIDINE GLUCONATE 15 ML: 1.2 RINSE ORAL at 21:47

## 2021-01-01 RX ADMIN — POTASSIUM CHLORIDE 20 MEQ: 14.9 INJECTION, SOLUTION INTRAVENOUS at 12:07

## 2021-01-01 RX ADMIN — PHENYLEPHRINE HYDROCHLORIDE 100 MCG: 10 INJECTION INTRAVENOUS at 07:54

## 2021-01-01 RX ADMIN — VANCOMYCIN HYDROCHLORIDE 1250 MG: 1 INJECTION, POWDER, LYOPHILIZED, FOR SOLUTION INTRAVENOUS at 14:51

## 2021-01-01 RX ADMIN — METOLAZONE 10 MG: 5 TABLET ORAL at 08:07

## 2021-01-01 RX ADMIN — CALCIUM GLUCONATE 1 G: 20 INJECTION, SOLUTION INTRAVENOUS at 05:20

## 2021-01-01 RX ADMIN — INSULIN LISPRO 4 UNITS: 100 INJECTION, SOLUTION INTRAVENOUS; SUBCUTANEOUS at 12:06

## 2021-01-01 RX ADMIN — INSULIN HUMAN 10 UNITS: 100 INJECTION, SOLUTION PARENTERAL at 08:28

## 2021-01-01 RX ADMIN — CALCIUM CHLORIDE 1 G: 100 INJECTION, SOLUTION INTRAVENOUS at 00:41

## 2021-01-01 RX ADMIN — HYDROCORTISONE SODIUM SUCCINATE 50 MG: 100 INJECTION, POWDER, FOR SOLUTION INTRAMUSCULAR; INTRAVENOUS at 01:30

## 2021-01-01 RX ADMIN — AMIODARONE HYDROCHLORIDE 1 MG/MIN: 50 INJECTION, SOLUTION INTRAVENOUS at 03:47

## 2021-01-01 RX ADMIN — EPINEPHRINE 4 MCG/MIN: 1 INJECTION, SOLUTION, CONCENTRATE INTRAVENOUS at 02:52

## 2021-01-01 RX ADMIN — FAMOTIDINE 20 MG: 10 INJECTION INTRAVENOUS at 12:10

## 2021-01-01 RX ADMIN — SODIUM BICARBONATE 50 MEQ: 84 INJECTION INTRAVENOUS at 23:35

## 2021-01-01 RX ADMIN — SUGAMMADEX 350 MG: 100 INJECTION, SOLUTION INTRAVENOUS at 08:39

## 2021-01-01 RX ADMIN — SODIUM CHLORIDE, SODIUM GLUCONATE, SODIUM ACETATE, POTASSIUM CHLORIDE, MAGNESIUM CHLORIDE, SODIUM PHOSPHATE, DIBASIC, AND POTASSIUM PHOSPHATE 100 ML/HR: .53; .5; .37; .037; .03; .012; .00082 INJECTION, SOLUTION INTRAVENOUS at 01:56

## 2021-01-01 RX ADMIN — CEFEPIME HYDROCHLORIDE 1000 MG: 1 INJECTION, POWDER, FOR SOLUTION INTRAMUSCULAR; INTRAVENOUS at 16:45

## 2021-01-01 RX ADMIN — CALCIUM GLUCONATE 2 G: 20 INJECTION, SOLUTION INTRAVENOUS at 20:09

## 2021-01-01 RX ADMIN — Medication 100 MG: at 09:24

## 2021-01-01 RX ADMIN — FENTANYL CITRATE 50 MCG: 50 INJECTION INTRAMUSCULAR; INTRAVENOUS at 14:38

## 2021-01-01 RX ADMIN — NOREPINEPHRINE BITARTRATE 22 MCG/MIN: 1 INJECTION, SOLUTION, CONCENTRATE INTRAVENOUS at 02:52

## 2021-01-01 RX ADMIN — PANTOPRAZOLE SODIUM 40 MG: 40 INJECTION, POWDER, FOR SOLUTION INTRAVENOUS at 08:44

## 2021-01-01 RX ADMIN — METRONIDAZOLE 500 MG: 500 INJECTION, SOLUTION INTRAVENOUS at 09:06

## 2021-01-01 RX ADMIN — Medication 75 MCG/HR: at 18:34

## 2021-01-01 RX ADMIN — Medication 2 MG/HR: at 08:38

## 2021-01-01 RX ADMIN — ALBUMIN, HUMAN INJ 5% 12.5 G: 5 SOLUTION at 10:15

## 2021-01-01 RX ADMIN — SODIUM BICARBONATE 100 ML/HR: 84 INJECTION, SOLUTION INTRAVENOUS at 20:47

## 2021-01-01 RX ADMIN — ROCURONIUM BROMIDE 20 MG: 50 INJECTION, SOLUTION INTRAVENOUS at 11:50

## 2021-01-01 RX ADMIN — HYDROCORTISONE SODIUM SUCCINATE 50 MG: 100 INJECTION, POWDER, FOR SOLUTION INTRAMUSCULAR; INTRAVENOUS at 01:58

## 2021-01-01 RX ADMIN — FENTANYL CITRATE 100 MCG: 50 INJECTION INTRAMUSCULAR; INTRAVENOUS at 09:08

## 2021-01-01 RX ADMIN — SODIUM BICARBONATE 50 MEQ: 84 INJECTION INTRAVENOUS at 18:36

## 2021-01-01 RX ADMIN — HEPARIN SODIUM 20 UNITS/KG/HR: 10000 INJECTION, SOLUTION INTRAVENOUS at 19:21

## 2021-01-01 RX ADMIN — FENTANYL CITRATE 50 MCG: 50 INJECTION INTRAMUSCULAR; INTRAVENOUS at 10:43

## 2021-01-01 RX ADMIN — VASOPRESSIN 0.04 UNITS/MIN: 20 INJECTION INTRAVENOUS at 08:36

## 2021-01-01 RX ADMIN — NOREPINEPHRINE BITARTRATE 24 MCG/MIN: 1 INJECTION, SOLUTION, CONCENTRATE INTRAVENOUS at 13:11

## 2021-01-01 RX ADMIN — NOREPINEPHRINE BITARTRATE 22 MCG/MIN: 1 INJECTION, SOLUTION, CONCENTRATE INTRAVENOUS at 22:15

## 2021-01-01 RX ADMIN — HYDROCORTISONE SODIUM SUCCINATE 50 MG: 100 INJECTION, POWDER, FOR SOLUTION INTRAMUSCULAR; INTRAVENOUS at 19:40

## 2021-01-01 RX ADMIN — CHLORHEXIDINE GLUCONATE 15 ML: 1.2 RINSE ORAL at 09:35

## 2021-01-01 RX ADMIN — POTASSIUM CHLORIDE 40 MEQ: 1500 TABLET, EXTENDED RELEASE ORAL at 03:09

## 2021-01-01 RX ADMIN — NOREPINEPHRINE BITARTRATE 12 MCG/MIN: 1 INJECTION, SOLUTION, CONCENTRATE INTRAVENOUS at 08:41

## 2021-01-01 RX ADMIN — NOREPINEPHRINE BITARTRATE 30 MCG/MIN: 1 INJECTION, SOLUTION, CONCENTRATE INTRAVENOUS at 09:59

## 2021-01-01 RX ADMIN — METRONIDAZOLE 500 MG: 500 INJECTION, SOLUTION INTRAVENOUS at 17:43

## 2021-01-01 RX ADMIN — ETOMIDATE 16 MG: 20 INJECTION, SOLUTION INTRAVENOUS at 09:24

## 2021-01-01 RX ADMIN — NOREPINEPHRINE BITARTRATE 20 MCG/MIN: 1 INJECTION, SOLUTION, CONCENTRATE INTRAVENOUS at 19:03

## 2021-01-01 RX ADMIN — VANCOMYCIN HYDROCHLORIDE 1250 MG: 1 INJECTION, POWDER, LYOPHILIZED, FOR SOLUTION INTRAVENOUS at 12:11

## 2021-01-01 RX ADMIN — CEFEPIME HYDROCHLORIDE 1000 MG: 1 INJECTION, POWDER, FOR SOLUTION INTRAMUSCULAR; INTRAVENOUS at 03:22

## 2021-01-01 RX ADMIN — MILRINONE LACTATE IN DEXTROSE 0.25 MCG/KG/MIN: 200 INJECTION, SOLUTION INTRAVENOUS at 03:53

## 2021-01-01 RX ADMIN — CEFEPIME HYDROCHLORIDE 1000 MG: 1 INJECTION, POWDER, FOR SOLUTION INTRAMUSCULAR; INTRAVENOUS at 14:34

## 2021-01-01 RX ADMIN — HEPARIN SODIUM 16 UNITS/KG/HR: 10000 INJECTION, SOLUTION INTRAVENOUS at 00:04

## 2021-01-01 RX ADMIN — HYDROCORTISONE SODIUM SUCCINATE 50 MG: 100 INJECTION, POWDER, FOR SOLUTION INTRAMUSCULAR; INTRAVENOUS at 20:54

## 2021-01-01 RX ADMIN — AMIODARONE HYDROCHLORIDE 1 MG/MIN: 50 INJECTION, SOLUTION INTRAVENOUS at 19:05

## 2021-01-01 RX ADMIN — FAMOTIDINE 20 MG: 10 INJECTION INTRAVENOUS at 15:17

## 2021-01-01 RX ADMIN — INSULIN LISPRO 8 UNITS: 100 INJECTION, SOLUTION INTRAVENOUS; SUBCUTANEOUS at 06:22

## 2021-01-01 RX ADMIN — ALBUMIN (HUMAN) 12.5 G: 12.5 INJECTION, SOLUTION INTRAVENOUS at 15:13

## 2021-01-01 RX ADMIN — LORAZEPAM 0.5 MG: 2 INJECTION INTRAMUSCULAR; INTRAVENOUS at 14:23

## 2021-01-01 RX ADMIN — POTASSIUM CHLORIDE 40 MEQ: 29.8 INJECTION, SOLUTION INTRAVENOUS at 00:40

## 2021-01-01 RX ADMIN — CALCIUM GLUCONATE 1 G: 20 INJECTION, SOLUTION INTRAVENOUS at 20:10

## 2021-01-01 RX ADMIN — HYDROCORTISONE SODIUM SUCCINATE 50 MG: 100 INJECTION, POWDER, FOR SOLUTION INTRAMUSCULAR; INTRAVENOUS at 13:12

## 2021-01-01 RX ADMIN — HYDROCORTISONE SODIUM SUCCINATE 50 MG: 100 INJECTION, POWDER, FOR SOLUTION INTRAMUSCULAR; INTRAVENOUS at 02:51

## 2021-01-01 RX ADMIN — AMIODARONE HYDROCHLORIDE 1 MG/MIN: 50 INJECTION, SOLUTION INTRAVENOUS at 05:46

## 2021-01-01 RX ADMIN — NOREPINEPHRINE BITARTRATE 16 MCG/MIN: 1 INJECTION, SOLUTION, CONCENTRATE INTRAVENOUS at 06:20

## 2021-01-01 RX ADMIN — NOREPINEPHRINE BITARTRATE 20 MCG/MIN: 1 INJECTION, SOLUTION, CONCENTRATE INTRAVENOUS at 02:09

## 2021-01-01 RX ADMIN — PHENYLEPHRINE HYDROCHLORIDE 100 MCG: 10 INJECTION INTRAVENOUS at 07:53

## 2021-01-01 RX ADMIN — SODIUM BICARBONATE 50 MEQ: 84 INJECTION, SOLUTION INTRAVENOUS at 08:12

## 2021-01-01 RX ADMIN — NOREPINEPHRINE BITARTRATE 29 MCG/MIN: 1 INJECTION, SOLUTION, CONCENTRATE INTRAVENOUS at 10:41

## 2021-01-01 RX ADMIN — HEPARIN SODIUM 4000 UNITS: 1000 INJECTION INTRAVENOUS; SUBCUTANEOUS at 11:46

## 2021-01-01 RX ADMIN — Medication 20000 ML: at 05:30

## 2021-01-01 RX ADMIN — SODIUM BICARBONATE 50 MEQ: 84 INJECTION, SOLUTION INTRAVENOUS at 08:17

## 2021-01-01 RX ADMIN — DEXTROSE 150 MG: 50 INJECTION, SOLUTION INTRAVENOUS at 05:28

## 2021-01-01 RX ADMIN — PHENYLEPHRINE HYDROCHLORIDE 100 MCG: 10 INJECTION INTRAVENOUS at 08:13

## 2021-01-01 RX ADMIN — HEPARIN SODIUM 22 UNITS/KG/HR: 10000 INJECTION, SOLUTION INTRAVENOUS at 05:32

## 2021-01-01 RX ADMIN — MILRINONE LACTATE IN DEXTROSE 0.25 MCG/KG/MIN: 200 INJECTION, SOLUTION INTRAVENOUS at 05:22

## 2021-01-01 RX ADMIN — ALBUMIN (HUMAN): 12.5 INJECTION, SOLUTION INTRAVENOUS at 08:20

## 2021-01-01 NOTE — CONSULTS
Consultation - General Surgery   Mindy Fernandez 77 y o  female MRN: [id number]  Unit/Bed#: [facility] 699-36 Encounter: [id number]    Assessment/Plan   Assessment:  76 yo female w/ afib on eliquis, HTN, CAD, cardiomyopathy, CHF, who p/w diarrhea, RICHI, right gluteal cellulitis, and arterially placed triple lumen cathetar  Plan:  Right Gluteal Cellulitis POA: No evidence of abscess or gas on CT pelvis, lactate normal, reportedly slowly progressive over the past 6 days, continue antibiotics, will closely monitor progression of cellulitis which is currently outlined in marker with dots, if patient does not improve with above measures and no other source can be identified as  of her leukocytosis we will reconsider need for operation or bedside procedure  - F/U blood cultures, stool cultures/studies, C  Diff, urine cultures  Malpositioned right CVC POA: Vascular surgery consulted for evaluation and removal, patient on heparin gtt  Continue NPO  RICHI POA: most likely severe dehydration, likely due to combination of diarrhea and diuretics, continue IVF resucitation      History of Present Illness   HPI:  Mindy Fernandez is a 77 y o  female  afib on eliquis, HTN, CAD, cardiomyopathy, CHF, who p/w diarrhea, RICHI, right gluteal cellulitis, and arterially placed triple lumen cathetar  Patient endorses having a diarrheal illness for the past week with frequent watery stools  It has caused her to be very weak and fatigued at home where she lives by herself  She noticed a nickel sized tender area over her right buttock on Saturday  Since then that area has slowly grown over the course of the week  She denied any fevers or chills, however she has reportedly been febrile in the hospital   Patient also endorsed recent difficulty breathing since Tuesday  She recently recovered from COVID infection  She denied any abdominal pain, nausea, vomiting, chest pain, burning or pain with urination, change in urinary frequency  She denies having any area of cellulitis like this before or have any other areas currently  Consults    Review of Systems  10/14 systems reviewed and negative except those mentioned in the HPI  Historical Information   Past Medical History:   Diagnosis Date    A-fib [facility])     Breast cancer ([facility] [address] )     s/p right mastectomy    CHF (congestive heart failure) (HCC)     Coronary artery disease     [facility]     Essential (primary) hypertension     Hx of echocardiogram 06/05/2015    EF0 45 (45%) mild mitral regurg   / 2D w/CFD; EF0 43 (43%) mild MR 11/25/16    Hypertension     Mitral regurgitation     Non Hodgkin's lymphoma (HCC)     Nonrheumatic mitral (valve) insufficiency     Obesity     Persistent atrial fibrillation (HCC)     Renal disorder      Past Surgical History:   Procedure Laterality Date    MASTECTOMY Right 2018    TOTAL KNEE ARTHROPLASTY Right 2013     Social History   Social History     Substance and Sexual Activity   Alcohol Use Never    Frequency: Never     Social History     Substance and Sexual Activity   Drug Use Never     E-Cigarette/Vaping    E-Cigarette Use Never User      E-Cigarette/Vaping Substances    Nicotine No     THC No     CBD No     Flavoring No     Other No     Unknown No      Social History     Tobacco Use   Smoking Status Former Smoker   Smokeless Tobacco Never Used     Family History: No family history on file      Meds/Allergies   current meds:   Current Facility-Administered Medications   Medication Dose Route Frequency    acetaminophen (TYLENOL) tablet 975 mg  975 mg Oral Q8H PRN    atorvastatin (LIPITOR) tablet 40 mg  40 mg Oral Daily With Dinner    cefepime (MAXIPIME) 1,000 mg in dextrose 5 % 50 mL IVPB  1,000 mg Intravenous Q12H    chlorhexidine (PERIDEX) 0 12 % oral rinse 15 mL  15 mL Swish & Spit Q12H [address]    clindamycin (CLEOCIN) IVPB (premix in dextrose) 600 mg 50 mL  600 mg Intravenous Q6H    heparin (porcine) 25,000 units in 0 45% NaCl 250 mL infusion (premix)  3-20 Units/kg/hr (Order-Specific) Intravenous Titrated    heparin (porcine) injection 2,000 Units  2,000 Units Intravenous Q1H PRN    heparin (porcine) injection 4,000 Units  4,000 Units Intravenous Q1H PRN    magnesium sulfate 4 g/100 mL IVPB (premix) 4 g  4 g Intravenous Once    metroNIDAZOLE (FLAGYL) IVPB (premix) 500 mg 100 mL  500 mg Intravenous Q8H    multi-electrolyte (ISOLYTE-S PH 7 4) bolus 500 mL  500 mL Intravenous Once    multi-electrolyte (PLASMALYTE-A/ISOLYTE-S PH 7 4) IV solution  100 mL/hr Intravenous Continuous    norepinephrine (LEVOPHED) 4 mg (STANDARD CONCENTRATION) IV in sodium chloride 0 9% 250 mL  1-30 mcg/min Intravenous Titrated    sodium chloride 0 9 % infusion  5 mL/hr Intravenous Continuous    vancomycin (VANCOCIN) 1,250 mg in sodium chloride 0 9 % 250 mL IVPB  15 mg/kg Intravenous Q24H    vasopressin (PITRESSIN) 20 Units in sodium chloride 0 9 % 100 mL infusion  0 04 Units/min Intravenous Continuous    and PTA meds:   Prior to Admission Medications   Prescriptions Last Dose Informant Patient Reported? Taking?    CALCIUM CITRATE PO   Yes No   Sig: Take 1 tablet by mouth daily   anastrozole (ARIMIDEX) 1 mg tablet   Yes No   Sig: Take 1 mg by mouth daily   apixaban (ELIQUIS) 5 mg   No No   Sig: Take 1 tablet (5 mg total) by mouth 2 (two) times a day   atorvastatin (LIPITOR) 40 mg tablet   No No   Sig: Take 1 tablet (40 mg total) by mouth daily at bedtime   furosemide (LASIX) 20 mg tablet   No No   Sig: Take 1 tablet (20 mg total) by mouth 2 (two) times a day   lisinopril (ZESTRIL) 5 mg tablet   No No   Sig: Take 1 tablet (5 mg total) by mouth daily   metolazone (ZAROXOLYN) 2 5 mg tablet   No No   Sig: Take 1 tablet (2 5 mg total) by mouth daily   metoprolol succinate (TOPROL-XL) 50 mg 24 hr tablet   No No   Sig: TAKE 1 TABLET DAILY   potassium chloride (K-DUR,KLOR-CON) 10 mEq tablet   No No   Sig: Take 1 tablet (10 mEq total) by mouth 3 (three) times a day Take 1-2 tabs according to instructions      Facility-Administered Medications: None     Allergies   Allergen Reactions    Celecoxib Other (See Comments)    Other Other (See Comments)     Skin irritated and opened up       Objective   First Vitals:        Current Vitals:        No intake or output data in the 24 hours ending 01/01/21 0305    Invasive Devices     Central Venous Catheter Line            CVC Central Lines 12/31/20 Triple 20cm less than 1 day          Peripheral Intravenous Line            Peripheral IV 12/31/20 Left Leg less than 1 day                Physical Exam  Vitals signs and nursing note reviewed  Constitutional:       Appearance: She is well-developed  She is obese  She is ill-appearing  She is not diaphoretic  HENT:      Head: Normocephalic and atraumatic  Right Ear: External ear normal       Left Ear: External ear normal       Nose: Nose normal       Mouth/Throat:      Mouth: Mucous membranes are dry  Pharynx: Oropharynx is clear  Eyes:      General: No scleral icterus  Extraocular Movements: Extraocular movements intact  Conjunctiva/sclera: Conjunctivae normal       Pupils: Pupils are equal, round, and reactive to light  Neck:      Musculoskeletal: Normal range of motion and neck supple  Vascular: No JVD  Trachea: No tracheal deviation  Cardiovascular:      Rate and Rhythm: Tachycardia present  Rhythm irregular  Pulses: Normal pulses  Pulmonary:      Comments: Increased work of breathing  Chest:      Chest wall: No tenderness  Abdominal:      General: Abdomen is flat  There is no distension  Palpations: Abdomen is soft  Tenderness: There is no abdominal tenderness  Genitourinary:     Rectum: Normal    Musculoskeletal: Normal range of motion  General: No tenderness or deformity  Skin:     General: Skin is warm and dry  Capillary Refill: Capillary refill takes less than 2 seconds        Comments: Area of cellulitis over right gluteal region, tender and indurated, no fluctuant mass, no drainage   Neurological:      General: No focal deficit present  Mental Status: She is alert and oriented to person, place, and time  Cranial Nerves: No cranial nerve deficit  Sensory: No sensory deficit  Psychiatric:         Mood and Affect: Mood normal          Behavior: Behavior normal          Thought Content: Thought content normal          Judgment: Judgment normal              Lab Results:   CBC:   Lab Results   Component Value Date    WBC 23 68 (H) 01/01/2021    HGB 10 5 (L) 01/01/2021    HCT 32 0 (L) 01/01/2021    MCV 92 01/01/2021     01/01/2021    MCH 30 3 01/01/2021    MCHC 32 8 01/01/2021    RDW 16 0 (H) 01/01/2021    MPV 10 5 01/01/2021    NRBC 0 01/01/2021   , CMP:   Lab Results   Component Value Date    SODIUM 131 (L) 01/01/2021    K 3 4 (L) 01/01/2021     01/01/2021    CO2 19 (L) 01/01/2021    BUN 58 (H) 01/01/2021    CREATININE 1 91 (H) 01/01/2021    CALCIUM 8 8 01/01/2021    AST 21 01/01/2021    ALT 15 01/01/2021    ALKPHOS 133 (H) 01/01/2021    EGFR 25 01/01/2021   , Coagulation:   Lab Results   Component Value Date    INR 2 74 (H) 01/01/2021   , Urinalysis: No results found for: Willetta Fees, SPECGRAV, PHUR, LEUKOCYTESUR, NITRITE, PROTEINUA, GLUCOSEU, KETONESU, BILIRUBINUR, BLOODU, Amylase: No results found for: AMYLASE, Lipase: No results found for: LIPASE  Imaging:   Procedure: Xr Chest Portable    Result Date: 12/31/2020  Narrative: CHEST INDICATION:   s/p RIJ central venous catheter  COMPARISON:  Multiple priors, most recently CT from earlier the same day EXAM PERFORMED/VIEWS:  XR CHEST PORTABLE  The frontal view was performed utilizing dual energy radiographic technique  Images: 2 FINDINGS:  Right internal jugular approach central venous catheter tip overlies the superior heart margin, the catheter crosses midline with the tip directed towards the left  Cardiomediastinal silhouette appears unremarkable  Scattered reticular opacities are noted throughout the lungs, compatible with residua of recent Covid pneumonia  No pleural effusion or pneumothorax  Osseous structures appear within normal limits for patient age  Impression: Right internal jugular approach central venous catheter tip overlies the superior heart margin  The catheter tip crosses midline and is directed towards the left  Findings raise concern for possible arterial line placement  Recommend CT of the chest to evaluate line placement and exclude vascular injury  Scattered reticular opacities throughout the lungs compatible with residua of recent Covid pneumonia  I personally discussed this study with Fang LANE on 12/31/2020 at 7:45 PM  Workstation performed: [id number]     Procedure: Xr Chest 1 View Portable    Result Date: 12/31/2020  Narrative: CHEST INDICATION:   dyspnea, recently COVID +  Patient has confirmed COVID-19  COMPARISON:  November 18, 2020 EXAM PERFORMED/VIEWS:  XR CHEST PORTABLE FINDINGS: Cardiomediastinal silhouette appears unremarkable  Patchy opacities in the left lower lobe, improved since the exam from last month  Emphysematous changes in the upper lobes  Scattered linear opacities in both lower lobes, probably a combination of subsegmental atelectasis and fibrosis  Osseous structures appear within normal limits for patient age  Impression: 1  Emphysema  2   Scattered areas of subsegmental atelectasis or fibrosis  3   Patchy opacities in both lungs, with improvement since 11/18/2020  No new abnormality in the chest  Workstation performed: [id number]     Procedure: Ct Chest Wo Contrast    Result Date: 12/31/2020  Narrative: CT CHEST WITHOUT IV CONTRAST INDICATION:   Confirmation of Central line placement,exclude vascular injury   COMPARISON:  CT of the chest on 12/31/2020 at 4:34 PM  TECHNIQUE: CT examination of the chest was performed without intravenous contrast   Axial, sagittal, and coronal 2D reformatted images were created from the source data and submitted for interpretation  Radiation dose length product (DLP) for this visit:  332 84 mGy-cm   This examination, like all CT scans performed in the [facility], was performed utilizing techniques to minimize radiation dose exposure, including the use of iterative  reconstruction and automated exposure control  FINDINGS: Mildly motion limited examination  Right-sided vascular catheter demonstrates arterial extension and terminates within the ascending aorta (series 2, image 34)  There is mild fat stranding/fluid and soft tissue swelling in the perivascular region right lateral to the thyroid  LUNGS:  Hypoventilatory changes and reticular opacities in the lower lung zone predominance similar to prior examination  Emphysematous changes of lungs  The central airways are patent  PLEURA:  No pneumothorax or pleural effusion  HEART/GREAT VESSELS:  Mild aortic valvular calcifications  The heart is normal in size  No pericardial effusion  MEDIASTINUM AND MANA:  Unremarkable  CHEST WALL AND LOWER NECK:   Small amount of subcutaneous emphysema at the right lower neck  VISUALIZED STRUCTURES IN THE UPPER ABDOMEN:  Unremarkable  OSSEOUS STRUCTURES:  No acute fracture or destructive osseous lesion  Degenerative changes of the osseous structures  Portion of the right lateral 4th rib is missing  Grade 1 anterolisthesis of C3 on C4  Impression: Malposition of right neck catheter with arterial extension, its tip terminating in the ascending aorta  Mild stranding/fluid and soft tissue swelling in the perivascular region right lateral to the thyroid however no large measurable hematoma  No pneumothorax or pericardial effusion   I personally discussed this study with Claudette Costa on 12/31/2020 at 9:20 PM  Workstation performed: [id number]     Procedure: Ct Chest Wo Contrast    Result Date: 12/31/2020  Narrative: CT CHEST WITHOUT IV CONTRAST INDICATION:   Dr Woodward Rinne note from 12/31/2020 was reviewed  Patient has shortness of breath  Patient also has an abscess in the right buttocks  patient was diagnosed with Covid-19 in November 2020  COMPARISON:  Chest x-ray from 12/31/2020  Older chest x-rays from 11/18/2020 and 11/11/2020 also reviewed  Chest CT from [facility] performed on 6/23/2011 was also reviewed  TECHNIQUE: CT examination of the chest was performed without intravenous contrast   Axial, sagittal, and coronal 2D reformatted images were created from the source data and submitted for interpretation  Radiation dose length product (DLP) for this visit:  273 18 mGy-cm   This examination, like all CT scans performed in the [facility], was performed utilizing techniques to minimize radiation dose exposure, including the use of iterative  reconstruction and automated exposure control  FINDINGS: LUNGS:  There are reticular opacities throughout the lung parenchyma, with basilar predominance  This is probably residual scarring from patient's recent COVID-19 pneumonia  There is no airspace consolidation currently  There is moderate emphysema  There is a calcified granuloma in the right upper lobe (series 2 image 23 ) There is no tracheal or endobronchial lesion  PLEURA:  Unremarkable  HEART/GREAT VESSELS:  Unremarkable for patient's age  MEDIASTINUM AND MANA:  Unremarkable  CHEST WALL AND LOWER NECK:   Unremarkable  VISUALIZED STRUCTURES IN THE UPPER ABDOMEN:  Unremarkable  OSSEOUS STRUCTURES:  No acute fracture or destructive osseous lesion  Impression: There are reticular opacities throughout the lung parenchyma, with basilar predominance  This is probably residual scarring from patient's recent COVID-19 pneumonia  There is no airspace consolidation currently  There is moderate emphysema    Workstation performed: [id number]     Procedure: Ct Pelvis Wo Contrast    Result Date: 12/31/2020  Narrative: CT PELVIS WITHOUT IV CONTRAST INDICATION:   Abscess, anal or rectal abscess  Concern for right buttock abscess  COMPARISON:  Multiple priors, most recently CT 7/10/2020 TECHNIQUE: CT examination of the pelvis was performed without intravenous contrastAxial, sagittal, and coronal 2D reformatted images were created from the source data and submitted for interpretation  Radiation dose length product (DLP) for this visit:  640 47 mGy-cm   This examination, like all CT scans performed in the [facility], was performed utilizing techniques to minimize radiation dose exposure, including the use of iterative  reconstruction and automated exposure control  Enteric contrast was not administered  FINDINGS: VISUALIZED KIDNEYS/URETERS:  Kidneys not imaged  Visualized portions of ureters unremarkable  REPRODUCTIVE ORGANS:  Patient is status post hysterectomy  URINARY BLADDER:  Unremarkable  APPENDIX:  No findings to suggest appendicitis  VISUALIZED BOWEL:  Unremarkable  ABDOMINOPELVIC CAVITY:  No ascites or free intraperitoneal air  No lymphadenopathy  VISUALIZED VESSELS:  Aortoiliac atherosclerosis  ABDOMINOPELVIC WALL/INGUINAL REGIONS: There is diffuse subcutaneous edema of the right gluteal region  No focal fluid collection identified  There is no soft tissue gas  No significant intramuscular edema identified  OSSEOUS STRUCTURES:  There are spinal degenerative changes  No acute fracture or destructive osseous lesion  Tarlov cysts are present  Impression: Subcutaneous edema of the right gluteal region, consistent with cellulitis in the appropriate clinical setting  No focal fluid collection to suggest the presence of abscess  No soft tissue gas identified  Workstation performed: [id number]     EKG, Pathology, and Other Studies: I have personally reviewed pertinent reports

## 2021-01-01 NOTE — PROGRESS NOTES
Vancomycin Assessment    Jarrett Escobar is a 77 y o  female who is currently receiving vancomycin 1000mg Q24H for skin-soft tissue infection     Relevant clinical data and objective history reviewed:  Creatinine   Date Value Ref Range Status   12/31/2020 2 64 (H) 0 60 - 1 30 mg/dL Final     Comment:     Standardized to IDMS reference method   11/25/2020 1 20 0 60 - 1 20 mg/dL Final     Comment:     Standardized to IDMS reference method   11/23/2020 1 03 0 60 - 1 20 mg/dL Final     Comment:     Standardized to IDMS reference method   04/27/2018 0 88 0 6 - 1 2 MG/DL Final     Comment:     IDMS method performed  The drugs Metamizole, Sulfasalazine, and Sulfapyridine may interfere and  result in false low results  07/02/2015 0 73 0 5 - 1 5 mg/dL Final     Comment:     Standardized to IDMS reference method   06/10/2015 0 77 0 60 - 1 30 mg/dL Final     Comment:     Standardized to IDMS reference method     BP (!) 104/47   Pulse (!) 121   Temp 97 8 °F (36 6 °C)   Resp 20   Ht 5' 10" (1 778 m)   Wt 79 8 kg (176 lb)   SpO2 96%   BMI 25 25 kg/m²   No intake/output data recorded  Lab Results   Component Value Date/Time    BUN 62 (H) 12/31/2020 12:07 PM    BUN 19 04/27/2018 09:42 AM    WBC 21 23 (H) 12/31/2020 12:07 PM    WBC 7 7 04/27/2018 09:42 AM    HGB 11 0 (L) 12/31/2020 12:07 PM    HGB 14 3 04/27/2018 09:42 AM    HCT 34 0 (L) 12/31/2020 12:07 PM    HCT 42 7 04/27/2018 09:42 AM    MCV 95 12/31/2020 12:07 PM    MCV 87 3 04/27/2018 09:42 AM     12/31/2020 12:07 PM     04/27/2018 09:42 AM     Temp Readings from Last 3 Encounters:   12/31/20 97 8 °F (36 6 °C)   11/25/20 (!) 97 °F (36 1 °C) (Tympanic)   11/11/20 97 8 °F (36 6 °C)     Vancomycin Days of Therapy: 1    Assessment/Plan  The patient is currently on vancomycin utilizing scheduled dosing based on actual body weight    Baseline risks associated with therapy include: pre-existing renal impairment and advanced age  The patient is currently receiving 1000mg Q24H and is clinically appropriate and dose will be continued  Pharmacy will also follow closely for s/sx of nephrotoxicity, infusion reactions, and appropriateness of therapy  BMP and CBC will be ordered per protocol  Plan for trough as patient approaches steady state, prior to the 4th  dose at approximately 18:30 on 01/03/2020  Due to infection severity, will target a trough of 15-20 (appropriate for most indications)   Pharmacy will continue to follow the patients culture results and clinical progress daily      Pamela Way

## 2021-01-01 NOTE — ASSESSMENT & PLAN NOTE
Wt Readings from Last 3 Encounters:   12/31/20 79 8 kg (176 lb)   12/31/20 79 8 kg (175 lb 14 8 oz)   11/25/20 80 kg (176 lb 5 9 oz)     · Currently appears hypovolemic  · EF was measured at 35%-40% on a transthoracic echocardiogram completed on 12/27/2019  · Hold all diuretics in the setting of acute kidney injury  · Hold lisinopril setting of acute kidney injury  · Continue toprol XL at a decreased dose of 25 mg PO Qdaily with blood pressure hold parameters and heart rate hold parameters  · Daily weights  · Strict intake/output measurements  · Monitor her volume status closely  · Outpatient follow-up with Cardiology

## 2021-01-01 NOTE — PROGRESS NOTES
I called Yasmin Yadav, friend of Marylene Holding, to provide update on clinical condition  Marylene Holding had acute hypoxemic respiratory failure this morning and was intubated  I communicated with Nisa Bailon that Marylene Holding is critical ill with severe sepsis and now vent dependent respiratory failure  Additionally I explained that she has a TLC in her right carotid artery that will likely have to be surgically removed, although due to underlying illness this will have to likely wait  I also asked if Marylene Holding has any next of kin, close relative or POA  She does not know of any close family members and only of a distant cousin  Nisa Bailon has been designated emergency contact but not POA

## 2021-01-01 NOTE — PROGRESS NOTES
Interval Gen Surg Progress Note    Since initial assessment patient was intubated for respiratory distress  She was started on propofol  Around this time her pressor requirement went from vaso and levo of 18 to levo of 28  WBC is 23 7 (0200) from 21 yesterday  Cr is improving 1 58 from 1 9 from 2 6 at admission  Lac 1 6 (0200)  ABG (8822): 7 24/37 2/107/15 6/-11    Patient was rolled and examined with attending surgeon  There is some blanching of skin  There is no extension of erythema  If anything is retracted from dotted marked borders  The medial b/l buttocks is more compatible with excoriation from wiping 2/2 all of her diarrhea  There is an area of induration but no fluctuance  Assessment/Plan:  - I do not believe that she has necrotizing fasciitis driving her hemodynamics  - This may be 2/2 Cdiff (given week of liquid diarrhea, recent antibioitcs when hospitalized for COVID) coupled with lasix use at home leading to hypovolemia  Could be septic shock 2/2 another etiology - blood cultures and UCx pending  Cdiff pending    - Either way I believe she needs more aggressive fluid resuscitation  Aware of her MV and TV regurgitation  Last EF 35-40%  RV systolic function nml  Would give more volume and trend end-points of resuscitation    - Consider alternative sedation such as precedex over propofol  - Will continue to follow and examine buttock  Cont broad-spectrum Abx       Raven Pandey, DO  Gen Surg  Ahmet Yu

## 2021-01-01 NOTE — PROCEDURES
Insert arterial line    Date/Time: 1/1/2021 3:16 AM  Performed by: RT Yolanda  Authorized by: RENNY Jolley     Patient location:  Bedside  Consent:     Consent obtained:  Emergent situation  Universal protocol:     Procedure explained and questions answered to patient or proxy's satisfaction: yes      Immediately prior to procedure a time out was called: yes      Patient identity confirmed:  Arm band and hospital-assigned identification number  Indications:     Indications: hemodynamic monitoring and multiple ABGs    Pre-procedure details:     Skin preparation:  Chlorhexidine    Preparation: Patient was prepped and draped in sterile fashion    Anesthesia (see MAR for exact dosages): Anesthesia method:  Local infiltration    Local anesthetic:  Lidocaine 1% w/o epi  Procedure details:     Location / Tip of Catheter:  Radial    Laterality:  Left    Thomas's test performed: yes      Needle gauge:  20 G    Placement technique:  Percutaneous    Number of attempts:  1    Successful placement: yes      Transducer: waveform confirmed    Post-procedure details:     Post-procedure:  Sterile dressing applied    Patient tolerance of procedure:   Tolerated well, no immediate complications    Complication (if applicable):  N/a

## 2021-01-01 NOTE — RESPIRATORY THERAPY NOTE
RT Ventilator Management Note  Vandana Carrasco 77 y o  female MRN: [id number]  Unit/Bed#: [facility] 518-09 Encounter: [id number]      Daily Screen       1/1/2021  0900             Patient safety screen outcome[de-identified]  Failed    Not Ready for Weaning due to[de-identified]  Oxygenation saturation < 90%; Underline problem not resolved;Going on Transport intubated;FiO2 >60%            Physical Exam:   Suction: ET Tube  O2 Device: vent     Patient was intubated and placed on vent AC 15/450/100%/6 PEEP  Patient was placed in VC+ due to asynchrony with ventilator  #8 0 ETT in place secured 22 cm @ teeth, 23 cm @ lips       01/01/21 0917   Vent Information   Vent ID [id number]   Vent type     Vent Mode AC/VC+   SpO2 96 %   AC/VC+ Settings   Resp Rate (BPM) 15 BPM   VT (mL) 450 mL   Insp Time (S) 0 9 S   FIO2 (%) 100 %   PEEP (cmH2O) 6 cmH2O   Rise Time (%) 50 %   Trigger Sensitivity Flow (LPM) 3 LPM   Humidification Heater   Heater Temp 98 6 °F (37 °C)   AC/VC+ Actuals   Resp Rate (BPM) 22 BPM   VT (mL) 519 mL   MV (Obs) 11 1   MAP (cmH2O) 9 4 cmH2O   Peak Pressure (cmH2O) 16 cmH2O   I:E Ratio (Obs) 1:2 4   Plateau Pressure (cm H2O) 16 cm H2O   Heater Temperature (Obs) 98 6 °F (37 °C)   AC/VC+ ALARMS   High Peak Pressure (cmH2O) 40 cmH2O   Low Peak Pressure (cmH2O) 0 cm H2O   High Resp Rate (BPM) 40 BPM   High MV (L/min) 20 L/min   Low MV (L/min) 3 L/min   High VT (mL) 1000 mL   Low VT (mL) 300 mL   High Hailey VTE (mL) 1000 mL   Low Hailey VTE (mL) 300 mL   High Spont VTE (mL) 1000 mL   Low Spont VTE (mL) 300 mL   High Insp Hailey VT (mL) 1000 mL   Maintenance   Alarm (pink) cable attached Yes   Resuscitation bag with peep valve at bedside Yes   Water bag changed No   Circuit changed No

## 2021-01-01 NOTE — PROCEDURES
Intubation    Date/Time: 1/1/2021 8:50 AM  Performed by: Twan Ontiveros PA-C  Authorized by: Twan Ontiveros PA-C     Patient location:  Bedside  Other Assisting Provider: Yes (comment) (Dr Matias Abraham)    Consent:     Consent obtained:  Verbal    Consent given by:  Patient    Risks discussed:  Aspiration, bleeding, death, brain injury, dental trauma, hypoxia, laryngeal injury and pneumothorax    Alternatives discussed:  Observation and delayed treatment  Universal protocol:     Patient identity confirmed:  Verbally with patient  Pre-procedure details:     Patient status:  Awake    Pretreatment medications:  Etomidate    Paralytics:  Succinylcholine  Indications:     Indications for intubation: respiratory distress    Procedure details:     Preoxygenation:  Bag valve mask    Intubation method:  Oral    Oral intubation technique:  Glidescope Xiomara Fallon    Laryngoscope blade: Mac 4    Tube size (mm):  8 0    Tube type:  Cuffed and hi-lo    Number of attempts:  1  Placement assessment:     ETT to teeth:  22    Tube secured with:  ETT grey    Breath sounds:  Equal    Placement verification: direct visualization, ETCO2 detector and tube exhalation      Ventilator settings:  15/450/100/6  Post-procedure details:     Patient tolerance of procedure:   Tolerated well, no immediate complications

## 2021-01-01 NOTE — CONSULTS
Consultation - Vascular Surgery  Sheeba De La Vega 77 y o  female MRN: [id number]  Unit/Bed#: [facility] 426-91 Encounter: [id number]        Assessment/Plan     Assessment:  77 F with septic shock of uncertain etiology, with intra-carotid right central line    Plan:  Agree with heparin gtt  Keep line in place with IVF @ 5 cc/h for line patency  Will discuss removal in OR with carotid repair, likely when sepsis has improved  Antibiotics and fluid resuscitation per ICU    History of Present Illness     HPI:  Sheeba De La Vega is a 77 y o  female with past medical history of HTN, HLD, DM, CAD, CHF, Afib on Eliquis, right mastectomy  She presented to the hospital with weakness, diarrhea, and right buttock pain  This has been progressively worsening since Tuesday  She states the diarrhea is non-bloody and watery in nature  She was admitted with COVID-19 pneumonia in November, and did receive antibiotics - ceftriaxone and doxycycline per chart review  She was brought to the ED where she was found to be septic  She had poor IV access and thus a right internal jugular triple lumen was attempted  This was noted to be malpositioned on chest x-ray and subsequent CT confirmed intra-arterial placement  She was [facility] to [facility] for further management  She denies fevers or chills  Does endorse some shortness of breath  Review of Systems   Constitutional: Positive for appetite change and fatigue  Negative for activity change, chills and fever  HENT: Negative  Eyes: Negative  Respiratory: Positive for shortness of breath  Negative for chest tightness  Cardiovascular: Negative  Gastrointestinal: Positive for diarrhea  Negative for nausea and vomiting  Endocrine: Negative  Genitourinary: Negative  Musculoskeletal: Negative  Skin: Negative  Allergic/Immunologic: Negative  Neurological: Negative  Hematological: Negative  Psychiatric/Behavioral: Negative          Historical Information   Past Medical History:   Diagnosis Date    A-fib [facility])     [facility]     s/p right mastectomy    CHF (congestive heart failure) (HCC)     Coronary artery disease     Dilated cardiomyopathy ([facility] )     Essential (primary) hypertension     Hx of echocardiogram 06/05/2015    EF0 45 (45%) mild mitral regurg   / 2D w/CFD; EF0 43 (43%) mild MR 11/25/16    Hypertension     Mitral regurgitation     Non Hodgkin's lymphoma (HCC)     Nonrheumatic mitral (valve) insufficiency     Obesity     Persistent atrial fibrillation (HCC)     Renal disorder      Past Surgical History:   Procedure Laterality Date    MASTECTOMY Right 2018    TOTAL KNEE ARTHROPLASTY Right 2013     Social History   Social History     Substance and Sexual Activity   Alcohol Use Never    Frequency: Never     Social History     Substance and Sexual Activity   Drug Use Never     Social History     Tobacco Use   Smoking Status Former Smoker   Smokeless Tobacco Never Used     Family History: No family history on file  Meds/Allergies   PTA meds:   Prior to Admission Medications   Prescriptions Last Dose Informant Patient Reported? Taking?    CALCIUM CITRATE PO   Yes No   Sig: Take 1 tablet by mouth daily   anastrozole (ARIMIDEX) 1 mg tablet   Yes No   Sig: Take 1 mg by mouth daily   apixaban (ELIQUIS) 5 mg   No No   Sig: Take 1 tablet (5 mg total) by mouth 2 (two) times a day   atorvastatin (LIPITOR) 40 mg tablet   No No   Sig: Take 1 tablet (40 mg total) by mouth daily at bedtime   furosemide (LASIX) 20 mg tablet   No No   Sig: Take 1 tablet (20 mg total) by mouth 2 (two) times a day   lisinopril (ZESTRIL) 5 mg tablet   No No   Sig: Take 1 tablet (5 mg total) by mouth daily   metolazone (ZAROXOLYN) 2 5 mg tablet   No No   Sig: Take 1 tablet (2 5 mg total) by mouth daily   metoprolol succinate (TOPROL-XL) 50 mg 24 hr tablet   No No   Sig: TAKE 1 TABLET DAILY   potassium chloride (K-DUR,KLOR-CON) 10 mEq tablet   No No   Sig: Take 1 tablet (10 mEq total) by mouth 3 (three) times a day Take 1-2 tabs according to instructions      Facility-Administered Medications: None     Allergies   Allergen Reactions    Celecoxib Other (See Comments)    Other Other (See Comments)     Skin irritated and opened up       Objective   First Vitals:        Current Vitals:        No intake or output data in the 24 hours ending 01/01/21 0315    Invasive Devices     Central Venous Catheter Line            CVC Central Lines 12/31/20 Triple 20cm less than 1 day          Peripheral Intravenous Line            Peripheral IV 12/31/20 Left Leg less than 1 day                Physical Exam  Constitutional:       Appearance: She is obese  HENT:      Head: Normocephalic  Mouth/Throat:      Mouth: Mucous membranes are moist    Eyes:      Extraocular Movements: Extraocular movements intact  Pupils: Pupils are equal, round, and reactive to light  Neck:      Comments: Right neck line in place, no significant swelling or hematoma  Cardiovascular:      Rate and Rhythm: Tachycardia present  Pulmonary:      Comments: Conversational dyspnea  Chest:      Chest wall: No tenderness  Abdominal:      General: There is no distension  Palpations: Abdomen is soft  Tenderness: There is no abdominal tenderness  Musculoskeletal:      Comments: Palp DP bilaterally   Skin:     General: Skin is warm and dry  Capillary Refill: Capillary refill takes less than 2 seconds  Neurological:      General: No focal deficit present  Mental Status: She is alert and oriented to person, place, and time        Comments: 5/5 strength bilateral upper and lower extremities   Psychiatric:         Mood and Affect: Mood normal          Lab Results:   CBC:   Lab Results   Component Value Date    WBC 23 68 (H) 01/01/2021    HGB 10 5 (L) 01/01/2021    HCT 32 0 (L) 01/01/2021    MCV 92 01/01/2021     01/01/2021    MCH 30 3 01/01/2021    MCHC 32 8 01/01/2021    RDW 16 0 (H) 01/01/2021    MPV 10 5 01/01/2021    NRBC 0 01/01/2021   , CMP:   Lab Results   Component Value Date    SODIUM 131 (L) 01/01/2021    K 3 4 (L) 01/01/2021     01/01/2021    CO2 19 (L) 01/01/2021    BUN 58 (H) 01/01/2021    CREATININE 1 91 (H) 01/01/2021    CALCIUM 8 8 01/01/2021    AST 21 01/01/2021    ALT 15 01/01/2021    ALKPHOS 133 (H) 01/01/2021    EGFR 25 01/01/2021   , Coagulation:   Lab Results   Component Value Date    INR 2 74 (H) 01/01/2021     Imaging: I have personally reviewed pertinent reports  EKG, Pathology, and Other Studies: I have personally reviewed pertinent reports        Code Status: Level 1 - Full Code  Advance Directive and Living Will:      Power of :    POLST:

## 2021-01-01 NOTE — DISCHARGE SUMMARY
[facility] Internal Medicine  Discharge- Ramo Rosado 1945, 77 y o  female MRN: [id number]    Unit/Bed#: 999-60 Encounter: [id number]    Primary Care Provider: Chele Arroyo MD   Date and time admitted to hospital: 12/31/2020 11:02 AM        * Sepsis [facility])  Assessment & Plan  · Admit to med/surg level of care  · Sepsis was present on admission secondary to cellulitis of the right buttock  · SIRS criteria was met with a fever, leukocytosis, tachycardia, and tachypnea  · The patient had COVID-19 pneumonia in November 2020 (Diagnosed 11/11/2020)  · I discussed the case with Infectious Disease  · The patient does not need airborne COVID precautions at this time, because it is highly unlikely the patient has been re-infected with COVID-19 this quickly after her previous infection  · Utilize broad-spectrum antibiotics with IV vancomycin and IV cefepime  · Check blood cultures x 2 sets  · Check a MRSA nasal screen  · Check stool cultures including a Clostridium difficile culture, a Rotavirus stool antigen test, a stool culture for enteric bacterial pathogens, and stool for fecal leukocytes  · Check a lactic acid level  · Utilize NSS IV fluids at 100 ml/hr  · Was not given the initial 30 ml/kg NSS IV fluid bolus per the sepsis protocol with the patient having known chronic systolic congestive heart failure  · The patient required central line placement (right internal jugular triple-lumen catheter placement) by Dr Jesse Nino (General Surgery) secondary to lack of any peripheral IV access    · Consult General Surgery to evaluate the cellulitis of the right buttock    Hyperbilirubinemia  Assessment & Plan  · Likely secondary to sepsis  · Follow the total bilirubin level    Hypercalcemia  Assessment & Plan  · Check an ionized calcium level  · Check an intact-PTH level  · Utilize NSS IV fluids at 100 ml/hr    Acute kidney injury [facility])  Assessment & Plan  Acute kidney injury was present admission and secondary to prerenal azotemia in the setting of sepsis and diarrhea  Baseline serum creatinine of 1 0-1 2 mg/dl  Avoid all nephrotoxic agents  Check a urine sodium level  Check a urine protein/creatinine level  Check the patient post-void residual amounts every shift with bladder scanning, and follow the urinary retention protocol  Consult Nephrology  Serial laboratory testing to monitor the patient's renal function and electrolytes    Results from last 7 days   Lab Units 12/31/20 2205 12/31/20  1207   SODIUM mmol/L 128* 132*   POTASSIUM mmol/L 3 9 5 6*   CHLORIDE mmol/L 96* 98*   CO2 mmol/L 17* 22   BUN mg/dL 61* 62*   CREATININE mg/dL 2 15* 2 64*   CALCIUM mg/dL 8 3 9 3           Hyperkalemia  Assessment & Plan  · Already treated with kayexalate 15 grams PO x 1 dose, an albuterol nebulizer treatment, 10 Units of IV insulin, and 25 ml of D50  · Follow the potassium level    Results from last 7 days   Lab Units 12/31/20 2205 12/31/20  1207   SODIUM mmol/L 128* 132*   POTASSIUM mmol/L 3 9 5 6*   CHLORIDE mmol/L 96* 98*   CO2 mmol/L 17* 22   BUN mg/dL 61* 62*   CREATININE mg/dL 2 15* 2 64*   CALCIUM mg/dL 8 3 9 3         Chronic respiratory failure with hypoxia, on home oxygen therapy (HCC)  Assessment & Plan  · On chronic home supplemental oxygen therapy at 2 lpm QHS and anytime while sleeping for a previous diagnosis of bradycardia and hypoxia during sleep  · Outpatient sleep study to check for obstructive sleep apnea  · Outpatient Pulmonology evaluation    Cellulitis of buttock  Assessment & Plan  · Sepsis was present on admission secondary to cellulitis of the right buttock  · SIRS criteria was met with a fever, leukocytosis, tachycardia, and tachypnea  · The patient had COVID-19 pneumonia in November 2020 (Diagnosed 11/11/2020)  · I discussed the case with Infectious Disease    · The patient does not need airborne COVID precautions at this time, because it is highly unlikely the patient has been re-infected with COVID-19 this quickly after her previous infection  · Utilize broad-spectrum antibiotics with IV vancomycin and IV cefepime  · Check blood cultures x 2 sets  · Check a MRSA nasal screen  · Check stool cultures including a Clostridium difficile culture, a Rotavirus stool antigen test, a stool culture for enteric bacterial pathogens, and stool for fecal leukocytes  · Check a lactic acid level  · Utilize NSS IV fluids at 100 ml/hr  · Was not given the initial 30 ml/kg NSS IV fluid bolus per the sepsis protocol with the patient having known chronic systolic congestive heart failure  · The patient required central line placement (right internal jugular triple-lumen catheter placement) by Dr Gurinder Escoto (General Surgery) secondary to lack of any peripheral IV access  · Consult General Surgery to evaluate the cellulitis of the right buttock    Persistent atrial fibrillation (HCC)  Assessment & Plan  · Decreased toprol XL to 25 mg PO Qdaily in the setting of sepsis  · Continue anticoagulation with eliquis 5 mg PO BID  · Outpatient follow-up with Cardiology    Elevated alkaline phosphatase level  Assessment & Plan  · Likely secondary to immobility  · Follow the total alkaline phosphatase level    Pulmonary hypertension (HCC)  Assessment & Plan  · Outpatient sleep study to check for obstructive sleep apnea  · Outpatient Pulmonology evaluation    Diarrhea  Assessment & Plan  · Check stool cultures including a Clostridium difficile culture, a Rotavirus stool antigen test, a stool culture for enteric bacterial pathogens, and stool for fecal leukocytes    · Give Clostridium difficile infection prophylaxis with vancomycin 125 mg PO every 12 hours  · Utilize probiotics with florastor 250 mg PO BID    Chronic systolic congestive heart failure (HCC)  Assessment & Plan  Wt Readings from Last 3 Encounters:   12/31/20 79 8 kg (176 lb)   12/31/20 79 8 kg (175 lb 14 8 oz)   11/25/20 80 kg (176 lb 5 9 oz)     · Currently appears hypovolemic  · EF was measured at 35%-40% on a transthoracic echocardiogram completed on 12/27/2019  · Hold all diuretics in the setting of acute kidney injury  · Hold lisinopril setting of acute kidney injury  · Continue toprol XL at a decreased dose of 25 mg PO Qdaily with blood pressure hold parameters and heart rate hold parameters  · Daily weights  · Strict intake/output measurements  · Monitor her volume status closely  · Outpatient follow-up with Cardiology        Tricuspid valve insufficiency  Assessment & Plan  · Monitor the patient's volume status closely  · Outpatient follow-up with Cardiology    Mitral valve insufficiency  Assessment & Plan  · Monitor the patient's volume status closely  · Outpatient follow-up with Cardiology    History of 2019 novel coronavirus disease (COVID-19)  Assessment & Plan  · The patient had COVID-19 pneumonia in November 2020 (Diagnosed 11/11/2020)  · I discussed the case with Infectious Disease  · The patient does not need airborne COVID precautions at this time, because it is highly unlikely the patient has been re-infected with COVID-19 this quickly after her previous infection  Discharging Physician / Practitioner: Dona James PA-C  PCP: Gabriella Vasquez MD  Admission Date:   Admission Orders (From admission, onward)     Ordered        12/31/20 1437  Inpatient Admission  Once                   Discharge Date: 1/1/2021    Resolved Problems  Date Reviewed: 12/31/2020    None          Consultations During Hospital Stay:  · General Surgery    Procedures Performed:   · Central Line    Significant Findings / Test Results:   · Malpositioned right neck Catheter     Incidental Findings:   · None     Test Results Pending at Discharge (will require follow up):    · None     Outpatient Tests Requested:  · None    Complications:  None    Reason for Admission: Sepsis    Hospital Course:     Jarrett Escobar is a 77 y o  female patient who originally presented to the hospital on 12/31/2020 due to pain and erythema to right buttock  She was recently discharged form [facility] last week after [address] following prolonged hospitalization at [facility] 18 on campus for code 90 pneumonia in November 2020 she developed erythema and pain to her right buttocks over the last 3-4 days a seemed to be getting worse  Additionally she had episodes of watery diarrhea over the past few days  She was subsequently admitted to the [facility] for sepsis acute kidney injury, hyperkalemia cellulitis to the right buttock  Her PTA medication were continued  She did receive a dose of IV cefepime for sepsis and Kayexalate for treatment of hyperkalemia  Central line inserted  by Dr Terence Byrnes  Due to multiple unsuccessful attempts to gain peripheral IV access  Patient was noted to be  tachycardic and hypotensive  EKG showed A-fib with RVR  I was notified by radiology that catheter place in right neck may have been malpositioned and recommended CT scan to confirm  CT chest confirmed  that right neck catheter was malpositioned  Patient continued to be Tachycardiac and Hypotensive  It was unclear at that time if patient's tachycardia and hypotension was secondary to ongoing sepsis/septic shock or malpositioned catheter or combination of both  Peripheral IV access was established in left foot with 20 gauge IV catheter  Normal saline bolus 500 cc initiated  I discussed case with [facility] attending, Dr Terence Byrnes  Recommendations given to contact her vascular surgery and  initiate transfer  I notified vascular surgery (Dr Anna Marcum) agrees with [facility]  ER attending placed central line  IV Jose-Synephrine initiated  Patient remained stable after initiation of IV Jose-Synephrine infusion  Patient's friend Indra Henley) was notified via Phone of patient's status and plan to Transfer her to [facility] [place] [facility] for higher level of care   Patient was subsequently transferred to [facility] room 513 under the service of Dr Colten Tran  Please see above list of diagnoses and related plan for additional information  Condition at Discharge: stable     Discharge Day Visit / Exam:     * Please refer to separate progress note for these details *    Discussion with Family: Notified patient's friend Jessica Manley of patient's status and plans to transfer patient to [address] [place] [address]  Discharge instructions/Information to patient and family:   See after visit summary for information provided to patient and family  Provisions for Follow-Up Care:  See after visit summary for information related to follow-up care and any pertinent home health orders  Disposition:     [address] Transfer to [address] [place] [address] to   [address] SN[facility]:   · Not Applicable to this Patient - Not Applicable to this Patient    Planned Readmission: Readmit to B R 513     Discharge Statement:  I spent 45 minutes discharging the patient  This time was spent on the day of discharge  I had direct contact with the patient on the day of discharge  Greater than 50% of the total time was spent examining patient, answering all patient questions, arranging and discussing plan of care with patient as well as directly providing post-discharge instructions  Additional time then spent on discharge activities  Discharge Medications:  See after visit summary for reconciled discharge medications provided to patient and family        ** Please Note: This note has been constructed using a voice recognition system **

## 2021-01-01 NOTE — ASSESSMENT & PLAN NOTE
· Already treated with kayexalate 15 grams PO x 1 dose, an albuterol nebulizer treatment, 10 Units of IV insulin, and 25 ml of D50  · Follow the potassium level    Results from last 7 days   Lab Units 12/31/20 2205 12/31/20  1207   SODIUM mmol/L 128* 132*   POTASSIUM mmol/L 3 9 5 6*   CHLORIDE mmol/L 96* 98*   CO2 mmol/L 17* 22   BUN mg/dL 61* 62*   CREATININE mg/dL 2 15* 2 64*   CALCIUM mg/dL 8 3 9 3

## 2021-01-01 NOTE — PROGRESS NOTES
Patient transferred to [facility] room 513 in stable condition via [employer]  Report was called to THE [facility] in ICU  The patient transferred on NS at 100 and phenylpherine at 10 5 hr  A NS bolus of 500 is finishing up in a 20g in her left leg

## 2021-01-01 NOTE — ASSESSMENT & PLAN NOTE
Acute kidney injury was present admission and secondary to prerenal azotemia in the setting of sepsis and diarrhea  Baseline serum creatinine of 1 0-1 2 mg/dl  Avoid all nephrotoxic agents  Check a urine sodium level  Check a urine protein/creatinine level  Check the patient post-void residual amounts every shift with bladder scanning, and follow the urinary retention protocol  Consult Nephrology  Serial laboratory testing to monitor the patient's renal function and electrolytes    Results from last 7 days   Lab Units 12/31/20  2205 12/31/20  1207   SODIUM mmol/L 128* 132*   POTASSIUM mmol/L 3 9 5 6*   CHLORIDE mmol/L 96* 98*   CO2 mmol/L 17* 22   BUN mg/dL 61* 62*   CREATININE mg/dL 2 15* 2 64*   CALCIUM mg/dL 8 3 9 3

## 2021-01-01 NOTE — ASSESSMENT & PLAN NOTE
· Check stool cultures including a Clostridium difficile culture, a Rotavirus stool antigen test, a stool culture for enteric bacterial pathogens, and stool for fecal leukocytes    · Give Clostridium difficile infection prophylaxis with vancomycin 125 mg PO every 12 hours  · Utilize probiotics with florastor 250 mg PO BID

## 2021-01-01 NOTE — ASSESSMENT & PLAN NOTE
· Already treated with kayexalate 15 grams PO x 1 dose, an albuterol nebulizer treatment, 10 Units of IV insulin, and 25 ml of D50  · Follow the potassium level    Results from last 7 days   Lab Units 12/31/20  1207   SODIUM mmol/L 132*   POTASSIUM mmol/L 5 6*   CHLORIDE mmol/L 98*   CO2 mmol/L 22   BUN mg/dL 62*   CREATININE mg/dL 2 64*   CALCIUM mg/dL 9 3

## 2021-01-01 NOTE — PROCEDURES
Central Line Insertion    Date/Time: 12/31/2020 11:40 PM  Performed by: Alondra Smith MD  Authorized by: Alondra Smith MD     Patient location:  Bedside  Consent:     Consent obtained:  Written and verbal    Consent given by:  Patient    Risks discussed:  Arterial puncture, incorrect placement, nerve damage, infection and bleeding    Alternatives discussed:  No treatment  Universal protocol:     Patient identity confirmed:  Verbally with patient  Pre-procedure details:     Hand hygiene: Hand hygiene performed prior to insertion      Sterile barrier technique: All elements of maximal sterile technique followed      Skin preparation:  2% chlorhexidine  Indications:     Central line indications: medications requiring central line and hemodynamic monitoring    Sedation:     Sedation type: Anxiolysis  Procedure details:     Location:  Right femoral    Vessel type: vein      Laterality:  Right    Approach: percutaneous technique used      Patient position:  Flat    Catheter type:  Triple lumen 20cm    Ultrasound guidance: yes      Sterile ultrasound techniques: Sterile gel and sterile probe covers were used      Number of attempts:  1    Successful placement: yes    Post-procedure details:     Post-procedure:  Dressing applied and line sutured    Assessment:  Blood return through all ports    Patient tolerance of procedure:   Tolerated well, no immediate complications

## 2021-01-01 NOTE — PLAN OF CARE
Problem: Potential for Falls  Goal: Patient will remain free of falls  Description: INTERVENTIONS:  - Assess patient frequently for physical needs  -  Identify cognitive and physical deficits and behaviors that affect risk of falls    -  [place] fall precautions as indicated by assessment   - Educate patient/family on patient safety including physical limitations  - Instruct patient to call for assistance with activity based on assessment  - Modify environment to reduce risk of injury  - Consider OT/PT consult to assist with strengthening/mobility  Outcome: Progressing     Problem: Prexisting or High Potential for Compromised Skin Integrity  Goal: Skin integrity is maintained or improved  Description: INTERVENTIONS:  - Identify patients at risk for skin breakdown  - Assess and monitor skin integrity  - Assess and monitor nutrition and hydration status  - Monitor labs   - Assess for incontinence   - Turn and reposition patient  - Assist with mobility/ambulation  - Relieve pressure over bony prominences  - Avoid friction and shearing  - Provide appropriate hygiene as needed including keeping skin clean and dry  - Evaluate need for skin moisturizer/barrier cream  - Collaborate with interdisciplinary team   - Patient/family teaching  - Consider wound care consult   Outcome: Progressing     Problem: CARDIOVASCULAR - ADULT  Goal: Maintains optimal cardiac output and hemodynamic stability  Description: INTERVENTIONS:  - Monitor I/O, vital signs and rhythm  - Monitor for S/S and trends of decreased cardiac output  - Administer and titrate ordered vasoactive medications to optimize hemodynamic stability  - Assess quality of pulses, skin color and temperature  - Assess for signs of decreased coronary artery perfusion  - Instruct patient to report change in severity of symptoms  Outcome: Progressing  Goal: Absence of cardiac dysrhythmias or at baseline rhythm  Description: INTERVENTIONS:  - Continuous cardiac monitoring, vital signs, obtain 12 lead EKG if ordered  - Administer antiarrhythmic and heart rate control medications as ordered  - Monitor electrolytes and administer replacement therapy as ordered  Outcome: Progressing     Problem: RESPIRATORY - ADULT  Goal: Achieves optimal ventilation and oxygenation  Description: INTERVENTIONS:  - Assess for changes in respiratory status  - Assess for changes in mentation and behavior  - Position to facilitate oxygenation and minimize respiratory effort  - Oxygen administered by appropriate delivery if ordered  - Initiate smoking cessation education as indicated  - Encourage broncho-pulmonary hygiene including cough, deep breathe, Incentive Spirometry  - Assess the need for suctioning and aspirate as needed  - Assess and instruct to report SOB or any respiratory difficulty  - Respiratory Therapy support as indicated  Outcome: Progressing     Problem: GASTROINTESTINAL - ADULT  Goal: Minimal or absence of nausea and/or vomiting  Description: INTERVENTIONS:  - Administer IV fluids if ordered to ensure adequate hydration  - Maintain NPO status until nausea and vomiting are resolved  - Nasogastric tube if ordered  - Administer ordered antiemetic medications as needed  - Provide nonpharmacologic comfort measures as appropriate  - Advance diet as tolerated, if ordered  - Consider nutrition services referral to assist patient with adequate nutrition and appropriate food choices  Outcome: Progressing  Goal: Maintains or returns to baseline bowel function  Description: INTERVENTIONS:  - Assess bowel function  - Encourage oral fluids to ensure adequate hydration  - Administer IV fluids if ordered to ensure adequate hydration  - Administer ordered medications as needed  - Encourage mobilization and activity  - Consider nutritional services referral to assist patient with adequate nutrition and appropriate food choices  Outcome: Progressing  Goal: Maintains adequate nutritional intake  Description: INTERVENTIONS:  - Monitor percentage of each meal consumed  - Identify factors contributing to decreased intake, treat as appropriate  - Assist with meals as needed  - Monitor I&O, weight, and lab values if indicated  - Obtain nutrition services referral as needed  Outcome: Progressing     Problem: METABOLIC, FLUID AND ELECTROLYTES - ADULT  Goal: Electrolytes maintained within normal limits  Description: INTERVENTIONS:  - Monitor labs and assess patient for signs and symptoms of electrolyte imbalances  - Administer electrolyte replacement as ordered  - Monitor response to electrolyte replacements, including repeat lab results as appropriate  - Instruct patient on fluid and nutrition as appropriate  Outcome: Progressing  Goal: Fluid balance maintained  Description: INTERVENTIONS:  - Monitor labs   - Monitor I/O and WT  - Instruct patient on fluid and nutrition as appropriate  - Assess for signs & symptoms of volume excess or deficit  Outcome: Progressing  Goal: Glucose maintained within target range  Description: INTERVENTIONS:  - Monitor Blood Glucose as ordered  - Assess for signs and symptoms of hyperglycemia and hypoglycemia  - Administer ordered medications to maintain glucose within target range  - Assess nutritional intake and initiate nutrition service referral as needed  Outcome: Progressing     Problem: PAIN - ADULT  Goal: Verbalizes/displays adequate comfort level or baseline comfort level  Description: Interventions:  - Encourage patient to monitor pain and request assistance  - Assess pain using appropriate pain scale  - Administer analgesics based on type and severity of pain and evaluate response  - Implement non-pharmacological measures as appropriate and evaluate response  - Consider cultural and social influences on pain and pain management  - Notify physician/advanced practitioner if interventions unsuccessful or patient reports new pain  Outcome: Progressing     Problem: INFECTION - ADULT  Goal: Absence or prevention of progression during hospitalization  Description: INTERVENTIONS:  - Assess and monitor for signs and symptoms of infection  - Monitor lab/diagnostic results  - Monitor all insertion sites, i e  indwelling lines, tubes, and drains  - Monitor endotracheal if appropriate and nasal secretions for changes in amount and color  - [place] appropriate cooling/warming therapies per order  - Administer medications as ordered  - Instruct and encourage patient and family to use good hand hygiene technique  - Identify and instruct in appropriate isolation precautions for identified infection/condition  Outcome: Progressing     Problem: SAFETY ADULT  Goal: Patient will remain free of falls  Description: INTERVENTIONS:  - Assess patient frequently for physical needs  -  Identify cognitive and physical deficits and behaviors that affect risk of falls    -  [place] fall precautions as indicated by assessment   - Educate patient/family on patient safety including physical limitations  - Instruct patient to call for assistance with activity based on assessment  - Modify environment to reduce risk of injury  - Consider OT/PT consult to assist with strengthening/mobility  Outcome: Progressing  Goal: Maintain or return to baseline ADL function  Description: INTERVENTIONS:  -  Assess patient's ability to carry out ADLs; assess patient's baseline for ADL function and identify physical deficits which impact ability to perform ADLs (bathing, care of mouth/teeth, toileting, grooming, dressing, etc )  - Assess/evaluate cause of self-care deficits   - Assess range of motion  - Assess patient's mobility; develop plan if impaired  - Assess patient's need for assistive devices and provide as appropriate  - Encourage maximum independence but intervene and supervise when necessary  - Involve family in performance of ADLs  - Assess for home care needs following discharge   - Consider OT consult to assist with ADL evaluation and planning for discharge  - Provide patient education as appropriate  Outcome: Progressing  Goal: Maintain or return mobility status to optimal level  Description: INTERVENTIONS:  - Assess patient's baseline mobility status (ambulation, transfers, stairs, etc )    - Identify cognitive and physical deficits and behaviors that affect mobility  - Identify mobility aids required to assist with transfers and/or ambulation (gait belt, sit-to-stand, lift, walker, cane, etc )  - [place] fall precautions as indicated by assessment  - Record patient progress and toleration of activity level on Mobility SBAR; progress patient to next Phase/Stage  - Instruct patient to call for assistance with activity based on assessment  - Consider rehabilitation consult to assist with strengthening/weightbearing, etc   Outcome: Progressing     Problem: DISCHARGE PLANNING  Goal: Discharge to home or other facility with appropriate resources  Description: INTERVENTIONS:  - Identify barriers to discharge w/patient and caregiver  - Arrange for needed discharge resources and transportation as appropriate  - Identify discharge learning needs (meds, wound care, etc )  - Arrange for interpretive services to assist at discharge as needed  - Refer to Case Management Department for coordinating discharge planning if the patient needs post-hospital services based on physician/advanced practitioner order or complex needs related to functional status, cognitive ability, or social support system  Outcome: Progressing     Problem: Knowledge Deficit  Goal: Patient/family/caregiver demonstrates understanding of disease process, treatment plan, medications, and discharge instructions  Description: Complete learning assessment and assess knowledge base    Interventions:  - Provide teaching at level of understanding  - Provide teaching via preferred learning methods  Outcome: Progressing

## 2021-01-01 NOTE — ASSESSMENT & PLAN NOTE
· Decreased toprol XL to 25 mg PO Qdaily in the setting of sepsis  · Continue anticoagulation with eliquis 5 mg PO BID  · Outpatient follow-up with Cardiology

## 2021-01-01 NOTE — PHYSICAL THERAPY NOTE
Physical Therapy Cancellation Note:    Pt currently vented secondary to resp distress  Cancel PT services for today and will cont to follow as able to initiate PT eval once pt is medically appropriate and medically stable

## 2021-01-01 NOTE — ASSESSMENT & PLAN NOTE
· Admit to med/surg level of care  · Sepsis was present on admission secondary to cellulitis of the right buttock  · SIRS criteria was met with a fever, leukocytosis, tachycardia, and tachypnea  · The patient had COVID-19 pneumonia in November 2020 (Diagnosed 11/11/2020)  · I discussed the case with Infectious Disease  · The patient does not need airborne COVID precautions at this time, because it is highly unlikely the patient has been re-infected with COVID-19 this quickly after her previous infection  · Utilize broad-spectrum antibiotics with IV vancomycin and IV cefepime  · Check blood cultures x 2 sets  · Check a MRSA nasal screen  · Check stool cultures including a Clostridium difficile culture, a Rotavirus stool antigen test, a stool culture for enteric bacterial pathogens, and stool for fecal leukocytes  · Check a lactic acid level  · Utilize NSS IV fluids at 100 ml/hr  · Was not given the initial 30 ml/kg NSS IV fluid bolus per the sepsis protocol with the patient having known chronic systolic congestive heart failure  · The patient required central line placement (right internal jugular triple-lumen catheter placement) by Dr Worley Severs (General Surgery) secondary to lack of any peripheral IV access    · Consult General Surgery to evaluate the cellulitis of the right buttock

## 2021-01-01 NOTE — PROGRESS NOTES
Am events: Patient tachypnic, hypoxemic and states she feels SOB and is tired  She was intubated  Prior to intubation Juan José Friends indicated she does not have a POA  We kevyn discussed code status and she indicated she wanted to be intubated  After intubation patient was escalated on pressors and BE shows -11 ph 7 21  She was given 250 5% albumin which she responded to however vaso remains at 0 06 and levophed 30  I spoke with vascular team plan is for non urgent removal of R TLC intracarotid line she will remain on heparin  I spoke with general surgery team, at this time bedside exploration of wound deferred  Hx of non hodgkin lymphoma 1999 s/p R-CHOP, MALT lymphoma s/p resection with questionable margins in 2013 and R breast neoplasm 2019 with was on an aromatase inhibitor  Active issues:  Vasopressor dependent Septic Shock sceondary to R buttock celluitis  Recent COIVD pneumonia previously requiring nocturnal oxygen via NC  Chronic systilic HF Reduced EF 74-62% with severe MR and mod TR  TTE 1/1/21 EF 20-25% with diffuse HK, RV wnl, Severe MR, mod TR   Persistent Afib  Aortic placement of TLC  VDRF and hypoxemia  Diarrhea with negative cdiff  RICHI on CKD  Anemia  Acidosis likely relative hyperchloremia     Plan  Low dose propofol for sedation  Continue vasopressin, levophed for MAP>65  Add low dose epinephrine for reduced cardiac function in attempt to decrease vasopressin and levophed  Lactate cleared stopped trending pending change in clinical status  Patient given several boluses of albumin with improvement in pressor requirement  May need to decrease maintenance IVF  Cont AC/VC attempt to wean FiO2 for SpO2>90%  Hold TF with high pressor requirement  F/U blood cultures, negative cdiff, UA unremarkable  D/C clindamycin  Contine Vancomycin, cefepime and flagyl  Continue to monitor R buttock cellulitis at this time no indication for debridement  Add SSI   continue heparin gtt for TLC in aorta will need CTA when more stable and will need to have line removed in OR, appreciate vascular recs  Has femoral TLC which was placed under sterile conditions, will need line change in future  I provided approximately 45 minutes of critical care time for life threatening hypoxemia requring intubation and active titration of vasopressors for life threatening septic shock

## 2021-01-01 NOTE — H&P
H&P- Rich Whitley 1945, 77 y o  female MRN: [id number]    Unit/Bed#: 028-21 Encounter: [id number]    Primary Care Provider: Sergio Clay MD   Date and time admitted to hospital: 12/31/2020 11:02 AM        * Sepsis [facility])  Assessment & Plan  · Admit to med/surg level of care  · Sepsis was present on admission secondary to cellulitis of the right buttock  · SIRS criteria was met with a fever, leukocytosis, tachycardia, and tachypnea  · The patient had COVID-19 pneumonia in November 2020 (Diagnosed 11/11/2020)  · I discussed the case with Infectious Disease  · The patient does not need airborne COVID precautions at this time, because it is highly unlikely the patient has been re-infected with COVID-19 this quickly after her previous infection  · Utilize broad-spectrum antibiotics with IV vancomycin and IV cefepime  · Check blood cultures x 2 sets  · Check a MRSA nasal screen  · Check stool cultures including a Clostridium difficile culture, a Rotavirus stool antigen test, a stool culture for enteric bacterial pathogens, and stool for fecal leukocytes  · Check a lactic acid level  · Utilize NSS IV fluids at 100 ml/hr  · Was not given the initial 30 ml/kg NSS IV fluid bolus per the sepsis protocol with the patient having known chronic systolic congestive heart failure  · The patient required central line placement (right internal jugular triple-lumen catheter placement) by Dr Ld Coffey (General Surgery) secondary to lack of any peripheral IV access  · Consult General Surgery to evaluate the cellulitis of the right buttock    Cellulitis of buttock  Assessment & Plan  · Sepsis was present on admission secondary to cellulitis of the right buttock  · SIRS criteria was met with a fever, leukocytosis, tachycardia, and tachypnea  · The patient had COVID-19 pneumonia in November 2020 (Diagnosed 11/11/2020)  · I discussed the case with Infectious Disease    · The patient does not need airborne COVID precautions at this time, because it is highly unlikely the patient has been re-infected with COVID-19 this quickly after her previous infection  · Utilize broad-spectrum antibiotics with IV vancomycin and IV cefepime  · Check blood cultures x 2 sets  · Check a MRSA nasal screen  · Check stool cultures including a Clostridium difficile culture, a Rotavirus stool antigen test, a stool culture for enteric bacterial pathogens, and stool for fecal leukocytes  · Check a lactic acid level  · Utilize NSS IV fluids at 100 ml/hr  · Was not given the initial 30 ml/kg NSS IV fluid bolus per the sepsis protocol with the patient having known chronic systolic congestive heart failure  · The patient required central line placement (right internal jugular triple-lumen catheter placement) by Dr Andrew Yen (General Surgery) secondary to lack of any peripheral IV access  · Consult General Surgery to evaluate the cellulitis of the right buttock    Acute kidney injury [facility])  Assessment & Plan  Acute kidney injury was present admission and secondary to prerenal azotemia in the setting of sepsis and diarrhea  Baseline serum creatinine of 1 0-1 2 mg/dl  Avoid all nephrotoxic agents  Check a urine sodium level  Check a urine protein/creatinine level  Check the patient post-void residual amounts every shift with bladder scanning, and follow the urinary retention protocol  Consult Nephrology  Serial laboratory testing to monitor the patient's renal function and electrolytes    Results from last 7 days   Lab Units 12/31/20  1207   SODIUM mmol/L 132*   POTASSIUM mmol/L 5 6*   CHLORIDE mmol/L 98*   CO2 mmol/L 22   BUN mg/dL 62*   CREATININE mg/dL 2 64*   CALCIUM mg/dL 9 3           Diarrhea  Assessment & Plan  · Check stool cultures including a Clostridium difficile culture, a Rotavirus stool antigen test, a stool culture for enteric bacterial pathogens, and stool for fecal leukocytes    · Give Clostridium difficile infection prophylaxis with vancomycin 125 mg PO every 12 hours  · Utilize probiotics with florastor 250 mg PO BID    Hyperbilirubinemia  Assessment & Plan  · Likely secondary to sepsis  · Follow the total bilirubin level    Hypercalcemia  Assessment & Plan  · Check an ionized calcium level  · Check an intact-PTH level  · Utilize NSS IV fluids at 100 ml/hr    Elevated alkaline phosphatase level  Assessment & Plan  · Likely secondary to immobility  · Follow the total alkaline phosphatase level    Hyperkalemia  Assessment & Plan  · Already treated with kayexalate 15 grams PO x 1 dose, an albuterol nebulizer treatment, 10 Units of IV insulin, and 25 ml of D50  · Follow the potassium level    Results from last 7 days   Lab Units 12/31/20  1207   SODIUM mmol/L 132*   POTASSIUM mmol/L 5 6*   CHLORIDE mmol/L 98*   CO2 mmol/L 22   BUN mg/dL 62*   CREATININE mg/dL 2 64*   CALCIUM mg/dL 9 3         Pulmonary hypertension (HCC)  Assessment & Plan  · Outpatient sleep study to check for obstructive sleep apnea  · Outpatient Pulmonology evaluation    Tricuspid valve insufficiency  Assessment & Plan  · Monitor the patient's volume status closely  · Outpatient follow-up with Cardiology    Mitral valve insufficiency  Assessment & Plan  · Monitor the patient's volume status closely  · Outpatient follow-up with Cardiology    Chronic respiratory failure with hypoxia, on home oxygen therapy ([facility] [address] )  Assessment & Plan  · On chronic home supplemental oxygen therapy at 2 lpm QHS and anytime while sleeping for a previous diagnosis of bradycardia and hypoxia during sleep  · Outpatient sleep study to check for obstructive sleep apnea  · Outpatient Pulmonology evaluation    History of 2019 novel coronavirus disease (COVID-19)  Assessment & Plan  · The patient had COVID-19 pneumonia in November 2020 (Diagnosed 11/11/2020)  · I discussed the case with Infectious Disease    · The patient does not need airborne COVID precautions at this time, because it is highly unlikely the patient has been re-infected with COVID-19 this quickly after her previous infection  Chronic systolic congestive heart failure (HCC)  Assessment & Plan  Wt Readings from Last 3 Encounters:   12/31/20 79 8 kg (176 lb)   11/25/20 80 kg (176 lb 5 9 oz)   09/03/20 90 7 kg (200 lb)     · Currently appears hypovolemic  · EF was measured at 35%-40% on a transthoracic echocardiogram completed on 12/27/2019  · Hold all diuretics in the setting of acute kidney injury  · Hold lisinopril setting of acute kidney injury  · Continue toprol XL at a decreased dose of 25 mg PO Qdaily with blood pressure hold parameters and heart rate hold parameters  · Daily weights  · Strict intake/output measurements  · Monitor her volume status closely  · Outpatient follow-up with Cardiology        Persistent atrial fibrillation (HCC)  Assessment & Plan  · Decreased toprol XL to 25 mg PO Qdaily in the setting of sepsis  · Continue anticoagulation with eliquis 5 mg PO BID  · Outpatient follow-up with Cardiology        VTE Prophylaxis: Apixaban (Eliquis)  / sequential compression device   Code Status: Level 1-Full Code    Anticipated Length of Stay:  The patient will be admitted on an Inpatient basis with an anticipated length of stay of greater than 2 midnights  Justification for Hospital Stay:  The patient requires IV antibiotic treatment, continuous IV fluids, a work-up for sepsis, and a work-up for acute kidney injury  Chief Complaint:  Erythema and pain in the right buttock    History of Present Illness:    Rosalina Connor is a 77 y o  female who presents emergency department with the complaints of erythema and pain in the right buttock  The patient was recently discharged from [facility] last week, where she was residing for short-term rehabilitation after a prolonged hospitalization at [place] [address] for COVID-19 pneumonia in November 2020    Over the last 3-4 days, the patient has developed erythema and pain in the right buttock region  The erythema increased over the last 3-4 days  The pain in the right buttock region was described as a sharp quality and constant in nature  The patient has also been experiencing watery diarrhea over the last few days  The patient has been having multiple episodes of watery diarrhea on a daily basis  Nothing seemed to improve her symptoms  Review of Systems:    Review of Systems:  Per HPI, all other systems have been reviewed and were negative  Past Medical and Surgical History:     Past Medical History:   Diagnosis Date    A-fib [facility])     Breast cancer ([place] [address] )     s/p right mastectomy    CHF (congestive heart failure) ([place] [address] )     Coronary artery disease     Dilated cardiomyopathy ([place] [address] )     Essential (primary) hypertension     Hx of echocardiogram 06/05/2015    EF0 45 (45%) mild mitral regurg   / 2D w/CFD; EF0 43 (43%) mild MR 11/25/16    Hypertension     Mitral regurgitation     Non Hodgkin's lymphoma ([place] [address] )     Nonrheumatic mitral (valve) insufficiency     Obesity     Persistent atrial fibrillation ([place] [address] )     Renal disorder        Past Surgical History:   Procedure Laterality Date    MASTECTOMY Right 2018    TOTAL KNEE ARTHROPLASTY Right 2013       Meds/Allergies:    Prior to Admission medications    Medication Sig Start Date End Date Taking?  Authorizing Provider   anastrozole (ARIMIDEX) 1 mg tablet Take 1 mg by mouth daily 12/5/19 12/4/20  Historical Provider, MD   apixaban (ELIQUIS) 5 mg Take 1 tablet (5 mg total) by mouth 2 (two) times a day 4/16/20   Shadia Benavides PA-C   atorvastatin (LIPITOR) 40 mg tablet Take 1 tablet (40 mg total) by mouth daily at bedtime 11/25/20   Jo Gresham MD   CALCIUM CITRATE PO Take 1 tablet by mouth daily    Historical Provider, MD   furosemide (LASIX) 20 mg tablet Take 1 tablet (20 mg total) by mouth 2 (two) times a day 11/25/20   Jo Gresham MD   lisinopril (ZESTRIL) 5 mg tablet Take 1 tablet (5 mg total) by mouth daily 11/6/20   Eugene Pak PA-C   metolazone (ZAROXOLYN) 2 5 mg tablet Take 1 tablet (2 5 mg total) by mouth daily 6/25/20   Eugene Pak PA-C   metoprolol succinate (TOPROL-XL) 50 mg 24 hr tablet TAKE 1 TABLET DAILY 8/3/20   Eugene Pak PA-C   potassium chloride (K-DUR,KLOR-CON) 10 mEq tablet Take 1 tablet (10 mEq total) by mouth 3 (three) times a day Take 1-2 tabs according to instructions 5/21/20   Eugene Pak PA-C     I have reviewed home medications with patient personally  Allergies: Allergies   Allergen Reactions    Celecoxib Other (See Comments)    Other Other (See Comments)     Skin irritated and opened up       Social History:     Marital Status: Single     Substance Use History:   Social History     Substance and Sexual Activity   Alcohol Use Never    Frequency: Never     Social History     Tobacco Use   Smoking Status Former Smoker   Smokeless Tobacco Never Used     Social History     Substance and Sexual Activity   Drug Use Never       Family History:    non-contributory    Physical Exam:     Vitals:   Blood Pressure: (!) 104/47 (12/31/20 1706)  Pulse: (!) 121 (12/31/20 1706)  Temperature: 97 8 °F (36 6 °C) (12/31/20 1833)  Temp Source: Temporal (12/31/20 1103)  Respirations: 20 (12/31/20 1706)  Height: 5' 10" (177 8 cm) (12/31/20 1103)  Weight - Scale: 79 8 kg (176 lb) (12/31/20 1103)  SpO2: 96 % (12/31/20 1759)    Physical Exam  General:  NAD, follows commands  HEENT:  NC/AT, mucous membranes dry  Neck:  Supple, No JVP elevation  CV:  + S1, + S2, Tachycardic, Irregularly irregular rhythm  Pulm:  Expiratory wheezing bilaterally  Abd:  Soft, Non-tender, Non-distended  Ext:  No clubbing/cyanosis/edema  Skin:  No rashes  Neuro:  Awake, alert, oriented  Psych:  Normal mood and affect      Additional Data:     Lab Results: I have personally reviewed pertinent reports        Results from last 7 days   Lab Units 12/31/20  1207   WBC Thousand/uL 21 23* HEMOGLOBIN g/dL 11 0*   HEMATOCRIT % 34 0*   PLATELETS Thousands/uL 298   NEUTROS PCT % 84*   LYMPHS PCT % 5*   MONOS PCT % 9   EOS PCT % 0     Results from last 7 days   Lab Units 12/31/20  1207   SODIUM mmol/L 132*   POTASSIUM mmol/L 5 6*   CHLORIDE mmol/L 98*   CO2 mmol/L 22   BUN mg/dL 62*   CREATININE mg/dL 2 64*   ANION GAP mmol/L 12   CALCIUM mg/dL 9 3   ALBUMIN g/dL 2 5*   TOTAL BILIRUBIN mg/dL 1 30*   ALK PHOS U/L 142*   ALT U/L 18   AST U/L 20   GLUCOSE RANDOM mg/dL 157*                       Imaging: I have personally reviewed pertinent reports  CT chest wo contrast   Final Result by Prasad Medina MD (12/31 0673)      There are reticular opacities throughout the lung parenchyma, with basilar predominance  This is probably residual scarring from patient's recent COVID-19 pneumonia  There is no airspace consolidation currently  There is moderate emphysema  Workstation performed: [id number]         CT pelvis wo contrast   Final Result by Evelin Pérez DO (12/31 2916)      Subcutaneous edema of the right gluteal region, consistent with cellulitis in the appropriate clinical setting  No focal fluid collection to suggest the presence of abscess  No soft tissue gas identified  Workstation performed: [id number]         XR chest 1 view portable   Final Result by Zeinab Guillory MD (12/31 4200)      1  Emphysema  2   Scattered areas of subsegmental atelectasis or fibrosis  3   Patchy opacities in both lungs, with improvement since 11/18/2020    No new abnormality in the chest                   Workstation performed: [id number]         XR chest portable    (Results Pending)   VAS lower limb venous duplex study, complete bilateral    (Results Pending)       EKG, Pathology, and Other Studies Reviewed on Admission:   · EKG (12/31/2020):  Atrial fibrillation with a heart rate of 101 bpm     Allscripts / Epic Records Reviewed: Yes     ** Please Note: This note has been constructed using a voice recognition system   **

## 2021-01-01 NOTE — QUICK NOTE
Surgery Update    Right buttock re-examined and stable, erythema within marked boarders       Tesfaye Clement MD  PGY-4 General Surgery

## 2021-01-01 NOTE — PROGRESS NOTES
Vancomycin Assessment    Pt received 12 5mg/kg  adjusted bw as first dose  Adjusted subsequent dose to 15mg/kg actual bw since pt bmi 25 and crcl is 26 based on total weight  Dosing scheduled 18 hours post first dose  Song Ramirez is a 77 y o  female who is currently receiving vancomycin 1000mg iv q24 for skin-soft tissue infection     Relevant clinical data and objective history reviewed:  Creatinine   Date Value Ref Range Status   12/31/2020 2 15 (H) 0 60 - 1 30 mg/dL Final     Comment:     Standardized to IDMS reference method   12/31/2020 2 64 (H) 0 60 - 1 30 mg/dL Final     Comment:     Standardized to IDMS reference method   11/25/2020 1 20 0 60 - 1 20 mg/dL Final     Comment:     Standardized to IDMS reference method   04/27/2018 0 88 0 6 - 1 2 MG/DL Final     Comment:     IDMS method performed  The drugs Metamizole, Sulfasalazine, and Sulfapyridine may interfere and  result in false low results  07/02/2015 0 73 0 5 - 1 5 mg/dL Final     Comment:     Standardized to IDMS reference method   06/10/2015 0 77 0 60 - 1 30 mg/dL Final     Comment:     Standardized to IDMS reference method     There were no vitals taken for this visit  No intake/output data recorded    Lab Results   Component Value Date/Time    BUN 61 (H) 12/31/2020 10:05 PM    BUN 19 04/27/2018 09:42 AM    WBC 23 68 (H) 01/01/2021 02:05 AM    WBC 7 7 04/27/2018 09:42 AM    HGB 10 5 (L) 01/01/2021 02:05 AM    HGB 14 3 04/27/2018 09:42 AM    HCT 32 0 (L) 01/01/2021 02:05 AM    HCT 42 7 04/27/2018 09:42 AM    MCV 92 01/01/2021 02:05 AM    MCV 87 3 04/27/2018 09:42 AM     01/01/2021 02:05 AM     04/27/2018 09:42 AM     Temp Readings from Last 3 Encounters:   12/31/20 97 5 °F (36 4 °C) (Oral)   11/25/20 (!) 97 °F (36 1 °C) (Tympanic)   11/11/20 97 8 °F (36 6 °C)     Vancomycin Days of Therapy: 2    Assessment/Plan  The patient is currently on vancomycin utilizing scheduled dosing based on actual body weight  Baseline risks associated with therapy include: pre-existing renal impairment, concomitant nephrotoxic medications, advanced age, and dehydration  The patient is currently receiving 1000mg iv q24 and after clinical evaluation will be changed to 1250mg iv q24  Pharmacy will also follow closely for s/sx of nephrotoxicity, infusion reactions, and appropriateness of therapy  BMP and CBC will be ordered per protocol  Plan for trough as patient approaches steady state, prior to the 4th  dose at approximately 1230 01/04/2021  Due to infection severity, will target a trough of 15-20 (appropriate for most indications)   Pharmacy will continue to follow the patients culture results and clinical progress daily      Aliyah French, Pharmacist

## 2021-01-01 NOTE — QUICK NOTE
Surgery Update    R gluteus re-examined  It remains stable since evaluation at 1030 am, blanching erythema within marked boarders  Remains criticall ill in the ICU on levo 26 and vaso 0 04, unchanged  LA 1 6, Most recent base deficit -10 6  Will continue with serial exams       Ayana Elliott MD  PGY-4 General Surgery

## 2021-01-01 NOTE — H&P
H&P Exam - [facility] 77 y o  female MRN: [id number]  Unit/Bed#: [facility] 513-01 Encounter: [id number]      -------------------------------------------------------------------------------------------------------------  Chief Complaint: "My bottom hurts "     History of Present Illness     Rehana Castanon is a 77 y o  female with systolic CHF, mitral regurgitation, breast cancer s/p right nephrectomy, CKD-3, and persistent a fib was recently discharged to a short term rehab facility following an admission from 11/11/20 to 11/25/20 for COVID-19 pneumonia  She progressed well at rehab, and was discharged home with a health aid on 12/21/20  She states that on 12/22/20, she started having diarrhea, and was having 3-4 bowel movements per day  She then noticed the following day that she had pain on her right buttock  She was told by her home health nurse that it was possibly a pressure point, and the skin was unremarkable  She continued to have diarrhea and worsening right buttock pain that she eventually presented to the ED with at [facility] on 12/31/20  She was found to be in septic shock, and vasopressors were initiated  A central line was placed, which unfortunately terminated in her aorta vs her SVC  She was transferred to [facility] for treatment of worsening sepsis as well as evaluation by vascular surgery        History obtained from chart review and the patient   -------------------------------------------------------------------------------------------------------------  Assessment and Plan:    Neuro:    No acute issues   o Sleep/wake cycle regulation  o CAM-ICU BID  o Routine neuro checks     CV:    Septic shock  o Antibiotic plan below  o Patient likely still volume depleted from days of diarrhea  - S/p 1500ml IVF bolus prior to admission   - Will give small fluid bolus now   o Levophed titrated to goal MAP > 65  o Start vasopressin 0 04 units/min  o Initiate stress dose steroids, particularly given recent steroid use for COVID  - Hydrocortisone 50mg q6h   o Maintain central line  o Insert arterial line   o Holding home anti-hypertensive medications  - Metoprolol succinate 50mg q24h   - Lisinopril 5mg daily    Persistent atrial fibrillation/A fib with RVR   o Likely worsened in the setting of fever  o Holding home metoprolol succinate 50mcg   o Optimize electrolytes K > 4 0, mag > 2 0  o Check baseline EKG   o Continue to closely monitor telemetry  o Holding home Eliquis 2 5mg BID  o Start heparin drip    Central line complication (aortic placement)  o Vascular surgery consulted  o Start heparin gtt  o Will run slow IVF to prevent thrombus formation    Chronic systolic CHF/severe MR/mod TR  o 12/27/19 Echo: EF 35 - 40%, moderate diffuse hypokinesis with regional variations, RV normal, mod-severe TR, mod TR  o Echo was previously scheduled for September 2020, but never obtained  o Consider repeating this admission if continuing to require vasopressors   o Holding home diuretics:   - Lasix 20mg BID  - Metolazone 2 5mg daily   o Will need to judiciously provide fluids    Hyperlipidemia  o Atorvastatin 40mg qHS   o Hold if LFT's > 3x upper limit of normal     Pulm:   Resolved COVID-19   o Maintain SpO2 >90%  o  Continue pulmonary hygiene  Incentive spirometer q1h while awake, encourage coughing and deep breathing   Upright positioning   o CT chest x2 without acute findings of pneumonia or new consolidations     GI:    Diarrhea  o Checking C diff, giardia, stool O&P  o Withholding imodium until C diff is negative   o No dedicated abdominal imaging to evaluate for signs of enterocolitis  o Continue to monitor stool output   o Continue to monitor abdominal exam     Stress ulcer prophylaxis  o Not indicated    Bowel regimen  o Holding secondary to diarrhea     :    RICHI on CKD3  o Baseline creatinine: 0 91 - 1 20  o Admission creatinine: 2 64  o Current creatinine 1 91  o Suspect pre-renal and ATN component from hypovolemia, sepsis, hypotension, ACE-I  o No perkins catheter at this time  o Utilize external urinary catheter as able  o Strict q2h I/O monitoring  o Continue to follow renal function tests    F/E/N:    Fluids  o Maintenance fluids: Isolyte 100ml/hr  o Bolus with 500ml IV isolyte over 2 hours now given CHF and MR   o Diuresis plan: Holding home diuretics in the setting of sepsisi    Electrolytes  o Hyponatremia  - Likely hypovolemic hyponatremia in the setting of diarrhea, although close monitoring given concern for NSTI  - Continue volume resuscitation and monitor BMP  o Hypomagnesemia and hypokalemia  - Replete electrolytes with as needed to maintain K >4 0, Mag >2 0, Phos >3 0   Nutrition  o NPO in the event of OR today     Heme/Onc:    Anemia   o Baseline hemoglobin: 13 4 - 10 8  o Admission hemoglobin: 11 0  o Current hemoglobin: 10 5  o No sign of bleeding  o Transfuse for hemoglobin <7 0  o Baseline platelets: WNL  o Current platelets: 306  o Transfuse for platelets < 98I or < 50k in the presence of bleeding    History breast cancer  o S/p right mastectomy  o Holding home Arimidex 1mg daily    VTE prophylaxis:  Heparin gtt , SCD's to BLE    Endo:    No acute issues   o Last hemoglobin A1c unknown  o Monitor glucose on BMP  o Closely watch while on steroids   o Add insulin regimen as needed to maintain goal -180    ID:    Cellulitis of right buttock  o NSTI vs severe cellulitis  o 12/31/20 CT pelvis: Subcutaneous edema of the right gluteal region, consistent with cellulitis in the appropriate clinical setting  No focal fluid collection to suggest the presence of abscess    No soft tissue gas identified  o Acute care surgery consulted for possible debridement if needed   o Continue vanco/cefepime/flagyl IV  o Add clindamycin for now   o Repeat blood cultures   o 12/31/20 BCXx2: PENDING  o Procalcitonin: 6 90  o Continue to monitor fever and WBC curve   o Continue to monitor wound for worsening edema or erythema outside marked margins     MSK/Skin:    PT/OT    Reposition q2h, eliminate pressure points while in bed   Close skin surveillance       Disposition: Admit to Critical Care   Code Status: Level 1 - Full Code  --------------------------------------------------------------------------------------------------------------  Review of Systems   Constitutional: Positive for fatigue  HENT: Negative  Eyes: Negative  Respiratory: Positive for shortness of breath  Cardiovascular: Negative for chest pain, palpitations and leg swelling  Gastrointestinal: Positive for diarrhea  Negative for vomiting  Endocrine: Negative  Genitourinary: Negative  Negative for dysuria, pelvic pain and urgency  Musculoskeletal: Negative  Negative for arthralgias  Skin: Positive for color change and wound  Right buttock wound   Neurological: Negative  Hematological: Negative  Psychiatric/Behavioral: Negative  All other systems reviewed and are negative  A 12-point, complete review of systems was reviewed and negative except as stated above     Physical Exam  Vitals signs reviewed  Constitutional:       Interventions: Nasal cannula in place  HENT:      Head: Normocephalic and atraumatic  Mouth/Throat:      Mouth: Mucous membranes are dry  Eyes:      Conjunctiva/sclera: Conjunctivae normal       Pupils: Pupils are equal, round, and reactive to light  Neck:      Musculoskeletal: Full passive range of motion without pain and normal range of motion  Cardiovascular:      Rate and Rhythm: Tachycardia present  Rhythm irregularly irregular  Occasional extrasystoles are present  Pulses:           Radial pulses are 2+ on the right side and 2+ on the left side  Dorsalis pedis pulses are 2+ on the right side and 2+ on the left side  Posterior tibial pulses are 2+ on the right side and 2+ on the left side     Pulmonary:      Effort: Tachypnea present  Breath sounds: Examination of the right-lower field reveals decreased breath sounds  Examination of the left-lower field reveals decreased breath sounds  Decreased breath sounds present  No wheezing or rhonchi  Chest:      Breasts:         Right: Absent  Abdominal:      General: Bowel sounds are normal  There is no distension  Palpations: Abdomen is soft  Tenderness: There is no abdominal tenderness  Musculoskeletal:      Right lower leg: No edema  Left lower leg: No edema  Skin:     General: Skin is warm and dry  Capillary Refill: Capillary refill takes less than 2 seconds  Neurological:      General: No focal deficit present  Mental Status: She is alert  GCS: GCS eye subscore is 4  GCS verbal subscore is 5  GCS motor subscore is 6  Motor: Motor function is intact  Psychiatric:         Behavior: Behavior is cooperative            --------------------------------------------------------------------------------------------------------------  Vitals: There were no vitals filed for this visit  Temp  Min: 97 5 °F (36 4 °C)  Max: 101 3 °F (38 5 °C)        There is no height or weight on file to calculate BMI        Laboratory and Diagnostics:  Results from last 7 days   Lab Units 01/01/21  0205 12/31/20  1207   WBC Thousand/uL 23 68* 21 23*   HEMOGLOBIN g/dL 10 5* 11 0*   HEMATOCRIT % 32 0* 34 0*   PLATELETS Thousands/uL 333 298   NEUTROS PCT % 85* 84*   MONOS PCT % 10 9     Results from last 7 days   Lab Units 12/31/20  2205 12/31/20  1207   SODIUM mmol/L 128* 132*   POTASSIUM mmol/L 3 9 5 6*   CHLORIDE mmol/L 96* 98*   CO2 mmol/L 17* 22   ANION GAP mmol/L 15* 12   BUN mg/dL 61* 62*   CREATININE mg/dL 2 15* 2 64*   CALCIUM mg/dL 8 3 9 3   GLUCOSE RANDOM mg/dL 160* 157*   ALT U/L  --  18   AST U/L  --  20   ALK PHOS U/L  --  142*   ALBUMIN g/dL  --  2 5*   TOTAL BILIRUBIN mg/dL  --  1 30*               Results from last 7 days   Lab Units 21  0205 20  1207   TROPONIN I ng/mL 0 02 <0 02       Micro:  PENDING     EKG: A fib with RVR   Imagin20 CT chest: Malposition of right neck catheter with arterial extension, its tip terminating in the ascending aorta   Mild stranding/fluid and soft tissue swelling in the perivascular region right lateral to the thyroid however no large measurable hematoma   No pneumothorax or pericardial effusion  20 CT pelvis: Subcutaneous edema of the right gluteal region, consistent with cellulitis in the appropriate clinical setting   No focal fluid collection to suggest the presence of abscess   No soft tissue gas identified  20 CT chest: There are reticular opacities throughout the lung parenchyma, with basilar predominance   This is probably residual scarring from patient's recent COVID-19 pneumonia  There is no airspace consolidation currently  There is moderate emphysema    I have personally reviewed pertinent reports     and I have personally reviewed pertinent films in PACS    Historical Information   Past Medical History:   Diagnosis Date    A-fib [facility])     Breast cancer ([facility] [address] )     s/p right mastectomy    CHF (congestive heart failure) [facility] [address] [facility]     Coronary artery disease     Dilated cardiomyopathy ([facility] [address] [facility]     Essential (primary) hypertension     Hx of echocardiogram 2015    EF0 45 (45%) mild mitral regurg   / 2D w/CFD; EF0 43 (43%) mild MR 16    Hypertension     Mitral regurgitation     Non Hodgkin's lymphoma ([facility] [address] )     Nonrheumatic mitral (valve) insufficiency     Obesity     Persistent atrial fibrillation (HCC)     Renal disorder      Past Surgical History:   Procedure Laterality Date    MASTECTOMY Right 2018    TOTAL KNEE ARTHROPLASTY Right      Social History   Social History     Substance and Sexual Activity   Alcohol Use Never    Frequency: Never     Social History     Substance and Sexual Activity   Drug Use Never     Social History     Tobacco Use Smoking Status Former Smoker   Smokeless Tobacco Never Used     Exercise History: Ambulates with cane   Family History:   Family History   Problem Relation Age of Onset    Heart disease Mother     Heart disease Father     Breast cancer Cousin      I have reviewed this patient's family history and commented on sigificant items within the HPI      Medications:  Current Facility-Administered Medications   Medication Dose Route Frequency    acetaminophen (TYLENOL) tablet 975 mg  975 mg Oral Q8H PRN    atorvastatin (LIPITOR) tablet 40 mg  40 mg Oral Daily With Dinner    cefepime (MAXIPIME) 1,000 mg in dextrose 5 % 50 mL IVPB  1,000 mg Intravenous Q12H    chlorhexidine (PERIDEX) 0 12 % oral rinse 15 mL  15 mL Swish & Spit Q12H [address]    clindamycin (CLEOCIN) IVPB (premix in dextrose) 600 mg 50 mL  600 mg Intravenous Q6H    heparin (porcine) 25,000 units in 0 45% NaCl 250 mL infusion (premix)  3-20 Units/kg/hr (Order-Specific) Intravenous Titrated    heparin (porcine) injection 2,000 Units  2,000 Units Intravenous Q1H PRN    heparin (porcine) injection 4,000 Units  4,000 Units Intravenous Q1H PRN    multi-electrolyte (PLASMALYTE-A/ISOLYTE-S PH 7 4) IV solution  100 mL/hr Intravenous Continuous    norepinephrine (LEVOPHED) 4 mg (STANDARD CONCENTRATION) IV in sodium chloride 0 9% 250 mL  1-30 mcg/min Intravenous Titrated    vancomycin (VANCOCIN) IVPB (premix in dextrose) 1,000 mg 200 mL  12 5 mg/kg (Adjusted) Intravenous Q24H    vasopressin (PITRESSIN) 20 Units in sodium chloride 0 9 % 100 mL infusion  0 04 Units/min Intravenous Continuous     Home medications:  Prior to Admission Medications   Prescriptions Last Dose Informant Patient Reported? Taking?    CALCIUM CITRATE PO   Yes No   Sig: Take 1 tablet by mouth daily   anastrozole (ARIMIDEX) 1 mg tablet   Yes No   Sig: Take 1 mg by mouth daily   apixaban (ELIQUIS) 5 mg   No No   Sig: Take 1 tablet (5 mg total) by mouth 2 (two) times a day   atorvastatin (LIPITOR) 40 mg tablet   No No   Sig: Take 1 tablet (40 mg total) by mouth daily at bedtime   furosemide (LASIX) 20 mg tablet   No No   Sig: Take 1 tablet (20 mg total) by mouth 2 (two) times a day   lisinopril (ZESTRIL) 5 mg tablet   No No   Sig: Take 1 tablet (5 mg total) by mouth daily   metolazone (ZAROXOLYN) 2 5 mg tablet   No No   Sig: Take 1 tablet (2 5 mg total) by mouth daily   metoprolol succinate (TOPROL-XL) 50 mg 24 hr tablet   No No   Sig: TAKE 1 TABLET DAILY   potassium chloride (K-DUR,KLOR-CON) 10 mEq tablet   No No   Sig: Take 1 tablet (10 mEq total) by mouth 3 (three) times a day Take 1-2 tabs according to instructions      Facility-Administered Medications: None     Allergies: Allergies   Allergen Reactions    Celecoxib Other (See Comments)    Other Other (See Comments)     Skin irritated and opened up         ------------------------------------------------------------------------------------------------------------  Anticipated Length of Stay is > 2 midnights    Care Time Delivered:   Upon my evaluation, this patient had a high probability of imminent or life-threatening deterioration due to septic shock, cardiomyopathy, which required my direct attention, intervention, and personal management  I have personally provided 40 minutes ([phone]) of critical care time, exclusive of procedures, teaching, family meetings, and any prior time recorded by providers other than myself  RENNY Contreras        Portions of the record may have been created with voice recognition software  Occasional wrong word or "sound a like" substitutions may have occurred due to the inherent limitations of voice recognition software    Read the chart carefully and recognize, using context, where substitutions have occurred

## 2021-01-01 NOTE — ASSESSMENT & PLAN NOTE
· The patient had COVID-19 pneumonia in November 2020 (Diagnosed 11/11/2020)  · I discussed the case with Infectious Disease  · The patient does not need airborne COVID precautions at this time, because it is highly unlikely the patient has been re-infected with COVID-19 this quickly after her previous infection

## 2021-01-01 NOTE — ASSESSMENT & PLAN NOTE
· Sepsis was present on admission secondary to cellulitis of the right buttock  · SIRS criteria was met with a fever, leukocytosis, tachycardia, and tachypnea  · The patient had COVID-19 pneumonia in November 2020 (Diagnosed 11/11/2020)  · I discussed the case with Infectious Disease  · The patient does not need airborne COVID precautions at this time, because it is highly unlikely the patient has been re-infected with COVID-19 this quickly after her previous infection  · Utilize broad-spectrum antibiotics with IV vancomycin and IV cefepime  · Check blood cultures x 2 sets  · Check a MRSA nasal screen  · Check stool cultures including a Clostridium difficile culture, a Rotavirus stool antigen test, a stool culture for enteric bacterial pathogens, and stool for fecal leukocytes  · Check a lactic acid level  · Utilize NSS IV fluids at 100 ml/hr  · Was not given the initial 30 ml/kg NSS IV fluid bolus per the sepsis protocol with the patient having known chronic systolic congestive heart failure  · The patient required central line placement (right internal jugular triple-lumen catheter placement) by Dr Alba Vera (General Surgery) secondary to lack of any peripheral IV access    · Consult General Surgery to evaluate the cellulitis of the right buttock

## 2021-01-01 NOTE — RESPIRATORY THERAPY NOTE
RT Protocol Note  Rosalina Connor 77 y o  female MRN: [id number]  Unit/Bed#: [facility] 455-36 Encounter: [id number]    Assessment    Principal Problem:    Septic shock ([place] [address] )  Active Problems:    Persistent atrial fibrillation (HCC)    Chronic systolic congestive heart failure (HCC)    Hyponatremia    Cellulitis of buttock    Diarrhea    Acute kidney injury ([place] [address] )    Hyperbilirubinemia      Home Pulmonary Medications:  none       Past Medical History:   Diagnosis Date    A-fib ([place] [address] )     Breast cancer ([place] [address] )     s/p right mastectomy    CHF (congestive heart failure) ([place] [address] )     Coronary artery disease     Dilated cardiomyopathy ([place] [address] )     Essential (primary) hypertension     Hx of echocardiogram 06/05/2015    EF0 45 (45%) mild mitral regurg   / 2D w/CFD; EF0 43 (43%) mild MR 11/25/16    Hypertension     Mitral regurgitation     Non Hodgkin's lymphoma (HCC)     Nonrheumatic mitral (valve) insufficiency     Obesity     Persistent atrial fibrillation ([place] [address] )     Renal disorder      Social History     Socioeconomic History    Marital status: Single     Spouse name: None    Number of children: None    Years of education: None    Highest education level: None   Occupational History    None   Social Needs    Financial resource strain: None    Food insecurity     Worry: None     Inability: None    Transportation needs     Medical: None     Non-medical: None   Tobacco Use    Smoking status: Former Smoker    Smokeless tobacco: Never Used   Substance and Sexual Activity    Alcohol use: Never     Frequency: Never    Drug use: Never    Sexual activity: Not Currently   Lifestyle    Physical activity     Days per week: None     Minutes per session: None    Stress: None   Relationships    Social connections     Talks on phone: None     Gets together: None     Attends Muslim service: None     Active member of club or organization: None     Attends meetings of clubs or organizations: None     Relationship status: None    Intimate partner violence     Fear of current or ex partner: None     Emotionally abused: None     Physically abused: None     Forced sexual activity: None   Other Topics Concern    None   Social History Narrative    None       Subjective         Objective    Physical Exam:   Suction: ET Tube  O2 Device: (P) vent    Vitals:  Blood pressure (!) 86/45, pulse (!) 124, temperature 98 6 °F (37 °C), resp  rate (!) 30, SpO2 97 %  Results from last 7 days   Lab Units 01/01/21  0938   PH ART  7 240*   PCO2 ART mm Hg 37 2   PO2 ART mm Hg 107 1   HCO3 ART mmol/L 15 6*   BASE EXC ART mmol/L -11 0   O2 CONTENT ART mL/dL 16 6   O2 HGB, ARTERIAL % 96 2   ABG SOURCE  Line, Arterial   TWIN TEST  Yes       Imaging and other studies: I have personally reviewed pertinent reports  O2 Device: (P) vent     Plan    Respiratory Plan: (P) Vent/NIV/HFNC        Resp Comments: (P) Patient was intubated for increased WOB/SOB/hypoxia on NC O2  Patient is admitted with sepsis  She has had recent COVID-19 PNA, but prior to this she has not had any respiratory diagnosis and takes no respiratory medications at home  Will continue Respiratory protocol for ventilator management

## 2021-01-02 NOTE — UTILIZATION REVIEW
Initial Clinical Review    Transfer from [address] [place] [address]  Unit    Admission: Date/Time/Statement:   Admission Orders (From admission, onward)     Ordered        01/01/21 0125  Inpatient Admission  Once                   Orders Placed This Encounter   Procedures    Inpatient Admission     Standing Status:   Standing     Number of Occurrences:   1     Order Specific Question:   Admitting Physician     Answer:   Azul Romano [[id number]]     Order Specific Question:   Level of Care     Answer:   Critical Care [15]     Order Specific Question:   Estimated length of stay     Answer:   More than 2 Midnights     Order Specific Question:   Certification     Answer:   I certify that inpatient services are medically necessary for this patient for a duration of greater than two midnights  See H&P and MD Progress Notes for additional information about the patient's course of treatment  Assessment/Plan:    77  Y O female  Initially  Admitted to [facility]  On  12/31 with erythema and pain right buttock  Had a  Recent prolonged  Hospital stay/rehab after  COVID  19 pneumonia  Developed pain and erythema right buttocks with watery diarrhea  Admitted to med surg with Sepsis, RICHI, hyperkalemnia and  Cellulitis right buttock  Decision  To transfer to [facility] for  Worsening  Sepsis/  Further care  Additional PMH  Is breast cancer, CHF, CKD  Stage 3, MR  And  Right nephrectomy  Admit  Ip with Sepsis and plan is  RIYA, IVF, central line, A line,  Levophed,  IV hydrocortisone and vascular  Eval     1/1    0856    Intubated  For respiratory distress  She was started on propofol  Around this time her pressor requirement went from vaso and levo of 18 to levo of 28  WBC  Up to 23 7 from  21 1/1  General surgery note  Do not feel  Thi is  Necrotizing fasciitis  May be  2/2  C diff given recent diarrhea and RIYA  Could  Be septic shock  2/2  Another etiology  Needs aggressive  IVF resuscitation      Continue Levophed/vaso  Monitor labs  SURGERY DATE: 1/2/2021  Procedure(s) (LRB):  INCISION AND DRAINAGE (I&D) BUTTOCK (N/A)    Operative Findings:  Sanginopurulent fluid expressed from wound, no dead tissue, tracks from buttock to labia on the right side  Wound packed with 2 kerlix   Cultures taken   Buttock wound: [id number]  Labial counter incision: 3 [id number]    Remains on  3  Vasopressors  Recent  ABG  Shows worsening hypoxia, decreased  Ph    WBC continues  To rise  Remains intubated    Continue   RIYA>        Additional Vital Signs:   01/01/21 1100  --  106Abnormal   22  --  --  86/56  68 mmHg  96 %  --  --  --  --   01/01/21 1006  --  106Abnormal   24Abnormal   --  --  78/48  60 mmHg  98 %  --  --  --  --   01/01/21 1000  --  106Abnormal   24Abnormal   --  --  74/44  56 mmHg  98 %  --  --  --  --   01/01/21 0923  --  124Abnormal   30Abnormal   --  --  76/52  62 mmHg  97 %  --  --  --  --   01/01/21 0919  --  116Abnormal   21  --  --  72/46  58 mmHg  97 %  --  --  Ventilator  --   01/01/21 0917  --  130Abnormal   30Abnormal   --  --  78/52  62 mmHg  96 %  --  --  Ventilator  --   01/01/21 0910  --  134Abnormal   29Abnormal   --  --  94/64  76 mmHg  96 %  --  --  --  --   01/01/21 0900  --  132Abnormal   15  --  --  106/72  86 mmHg  98 %  --  --  --  --   01/01/21 0800  98 6 °F (37 °C)  104  34Abnormal   --  --  104/50  70 mmHg  92 %  --  --  --  --   01/01/21 0715  98 6 °F (37 °C)  92  19  --  --  80/46  60 mmHg  95 %  32  3 L/min  Nasal cannula  --   01/01/21 0700  98 6 °F (37 °C)  94  19  --  --  78/44  58 mmHg  95 %  --  --  --  --   01/01/21 0600  99 3 °F (37 4 °C)  100  20  --  --  76/44  58 mmHg  95 %  --  --  --  --   01/01/21 0500  100 °F (37 8 °C)  98  20  --  --  96/56  72 mmHg  95 %  --  --  --  --   01/01/21 0400  100 8 °F (38 2 °C)Abnormal   106Abnormal   44Abnormal   --  --  84/50  64 mmHg  92 %  --  --  --  --   01/01/21 0300  102 2 °F (39 °C)Abnormal   110Abnormal   29Abnormal   --  --  --  --  94 %  -- --  --  --   01/01/21 0200  103 3 °F (39 6 °C)Abnormal   118Abnormal   31Abnormal   86/45Abnormal   64  --  --  94 %  --  --             Pertinent Labs/Diagnostic Test Results:   CXR  ( 1/1)    Worsening left mid to lower lung zone airspace consolidation consistent with pneumonia  Endotracheal tube has been inserted with tip 4 5 cm above the cynthia  Again seen is a right neck central line, with tip in the ascending thoracic aorta confirmed on prior CT  Ct  Chest  ( 12/31)    Malposition of right neck catheter with arterial extension, its tip terminating in the ascending aorta   Mild stranding/fluid and soft tissue swelling in the perivascular region right lateral to the thyroid however no large measurable hematoma   No   pneumothorax or pericardial effusion  CXR   ( 12/31)      Ct  Chest  ( 12/31)    There are reticular opacities throughout the lung parenchyma, with basilar predominance   This is probably residual scarring from patient's recent COVID-19 pneumonia   There is no airspace consolidation currently  There is moderate emphysema      Ct  Pelvis  ( 12/31)   Subcutaneous edema of the right gluteal region, consistent with cellulitis in the appropriate clinical setting   No focal fluid collection to suggest the presence of abscess   No soft tissue gas identified     EKG  ( 12/31)   Atrial fibrillation       Tachycardic     HR  101      Results from last 7 days   Lab Units 01/02/21  1001 01/02/21  0338 01/02/21  0027 01/01/21  2332 01/01/21  0205 12/31/20  1207   WBC Thousand/uL 34 67* 30 07*  --   --  23 68* 21 23*   HEMOGLOBIN g/dL 10 0* 10 1*  --  10 0* 10 5* 11 0*   I STAT HEMOGLOBIN g/dl  --   --  9 9*  --   --   --    HEMATOCRIT % 30 1* 30 5*  --   --  32 0* 34 0*   HEMATOCRIT, ISTAT %  --   --  29*  --   --   --    PLATELETS Thousands/uL 415* 389  --   --  333 298   NEUTROS ABS Thousands/µL  --   --   --   --  20 01* 17 69*   BANDS PCT %  --  3  --   --   --   --          Results from last 7 days   Lab Units 01/02/21  1001 01/02/21  0348 01/02/21  0338 01/02/21  0330 01/02/21  0027 01/01/21  2332 01/01/21  2127 01/01/21  1750 01/01/21  0747 01/01/21  0205   SODIUM mmol/L 141  --   --  139  --   --  133* 136 133* 131*   POTASSIUM mmol/L 3 6  --   --  4 1  --   --  3 6 3 9 4 0 3 4*   CHLORIDE mmol/L 108  --   --  108  --   --  105 108 103 101   CO2 mmol/L 22  --   --  20*  --   --  17* 16* 17* 19*   CO2, I-STAT mmol/L  --   --   --   --  22  --   --   --   --   --    ANION GAP mmol/L 11  --   --  11  --   --  11 12 13 11   BUN mg/dL 51*  --   --  52*  --   --  51* 53* 56* 58*   CREATININE mg/dL 1 33*  --   --  1 24  --   --  1 30 1 35* 1 58* 1 91*   EGFR ml/min/1 73sq m 39  --   --  43  --   --  41 39 32 25   CALCIUM mg/dL 8 1*  --   --  8 6  --   --  8 0* 7 8* 8 4 8 8   CALCIUM, IONIZED mmol/L 1 09* 1 12  --   --   --  0 98*  --   --   --  1 06*   MAGNESIUM mg/dL  --   --  2 5  --   --   --  2 7*  --  2 5 1 1*   PHOSPHORUS mg/dL  --   --  5 0*  --   --   --   --   --   --  3 4     Results from last 7 days   Lab Units 01/02/21  0338 01/01/21  0205 12/31/20  1207   AST U/L 50* 21 20   ALT U/L 28 15 18   ALK PHOS U/L 125* 133* 142*   TOTAL PROTEIN g/dL 5 5* 6 0* 6 7   ALBUMIN g/dL 2 2* 2 2* 2 5*   TOTAL BILIRUBIN mg/dL 1 50* 1 68* 1 30*   BILIRUBIN DIRECT mg/dL 1 24*  --   --      Results from last 7 days   Lab Units 01/02/21  0621 01/01/21  2333 01/01/21  2332 01/01/21  1745 01/01/21  1150 12/31/20  2037   POC GLUCOSE mg/dl 314* 223* 93 245* 262* 290*     Results from last 7 days   Lab Units 01/02/21  1001 01/02/21  0330 01/01/21  2127 01/01/21  1750 01/01/21  0747 01/01/21  0205 12/31/20  2205 12/31/20  1207   GLUCOSE RANDOM mg/dL 235* 260* 210* 211* 224* 145* 160* 157*         Results from last 7 days   Lab Units 01/02/21  0338   HEMOGLOBIN A1C % 6 0*   EAG mg/dl 126      Results from last 7 days   Lab Units 01/02/21  1001 01/02/21  0330 01/02/21  0138   PH ART  7 280* 7 296* 7 286*   PCO2 ART mm Hg 43 7 37 6 39 0   PO2 ART mm Hg 70 2* 99 5 87 9   HCO3 ART mmol/L 20 1* 17 9* 18 2*   BASE EXC ART mmol/L -6 4 -7 9 -7 9   O2 CONTENT ART mL/dL 13 9* 15 3* 15 3*   O2 HGB, ARTERIAL % 92 2* 96 9 95 8   ABG SOURCE  Line, Arterial Line, Arterial Line, Arterial         Results from last 7 days   Lab Units 01/02/21  0027   I STAT BASE EXC mmol/L -4*   I STAT O2 SAT % 95*   ISTAT PH ART  7 350   I STAT ART PCO2 mm HG 38 0   I STAT ART PO2 mm HG 82 0   I STAT ART HCO3 mmol/L 21 0*         Results from last 7 days   Lab Units 01/01/21  0205 12/31/20  1207   TROPONIN I ng/mL 0 02 <0 02     Results from last 7 days   Lab Units 12/31/20  1207   D-DIMER QUANTITATIVE ug/ml FEU 2 11*     Results from last 7 days   Lab Units 01/02/21  1001 01/02/21  0618 01/01/21  2332  01/01/21  0205   PROTIME seconds  --   --   --   --  28 8*   INR   --   --   --   --  2 74*   PTT seconds 58* 60* 52*   < > 39*    < > = values in this interval not displayed           Results from last 7 days   Lab Units 01/02/21  0338 01/01/21  0508 01/01/21  0205   PROCALCITONIN ng/ml 25 71* 11 45* 6 90*     Results from last 7 days   Lab Units 01/02/21  1001 01/02/21  0618 01/02/21  0344 01/01/21  2332 01/01/21  1100 01/01/21  0205   LACTIC ACID mmol/L 4 8* 3 2* 2 3* 2 1* 1 8 1 6             Results from last 7 days   Lab Units 01/02/21  0330 12/31/20  1207   NT-PRO BNP pg/mL 88,411* 6,758*                         Results from last 7 days   Lab Units 01/01/21  0942   CLARITY UA  Cloudy   COLOR UA  Dk Yellow   SPEC GRAV UA  1 019   PH UA  5 0   GLUCOSE UA mg/dl Negative   KETONES UA mg/dl Trace*   BLOOD UA  Large*   PROTEIN UA mg/dl 30 (1+)*   NITRITE UA  Negative   BILIRUBIN UA  Negative   UROBILINOGEN UA E U /dl 0 2   LEUKOCYTES UA  Small*   WBC UA /hpf 4-10*   RBC UA /hpf Innumerable*   BACTERIA UA /hpf Occasional   EPITHELIAL CELLS WET PREP /hpf Occasional                     Results from last 7 days   Lab Units 01/01/21  0347   C DIFF TOXIN B BY PCR Negative             Results from last 7 days   Lab Units 01/01/21  0508 01/01/21  0406 12/31/20  1816 12/31/20  1737   BLOOD CULTURE  Received in Microbiology Lab  Culture in Progress  No Growth at 24 hrs  Received in Microbiology Lab  Culture in Progress  Received in Microbiology Lab  Culture in Progress       Results from last 7 days   Lab Units 01/02/21  0338   TOTAL COUNTED  100         Present on Admission:   Persistent atrial fibrillation (HCC)   Chronic systolic congestive heart failure (HCC)   Hyponatremia   Cellulitis of buttock   Diarrhea   [facility]   Septic shock ([facility])   Hyperbilirubinemia      Admitting Diagnosis: Sepsis ([facility] ) [A41 9]  Age/Sex: 77 y o  female  Admission Orders:  Scheduled Medications:  atorvastatin, 40 mg, Oral, Daily With Dinner  cefepime, 1,000 mg, Intravenous, Q12H  chlorhexidine, 15 mL, Swish & Spit, Q12H [address]  famotidine, 20 mg, Intravenous, Q24H ROYER  hydrocortisone sodium succinate, 50 mg, Intravenous, Q6H ROYER  insulin lispro, 2-12 Units, Subcutaneous, Q6H [address]  melatonin, 6 mg, Oral, HS  metroNIDAZOLE, 500 mg, Intravenous, Q8H  potassium chloride, 20 mEq, Intravenous, Once  vancomycin, 15 mg/kg, Intravenous, Q24H      Continuous IV Infusions:  amiodarone, 1 mg/min, Intravenous, Continuous    Followed by  amiodarone, 0 5 mg/min, Intravenous, Continuous  dexmedetomidine, 0 1-0 7 mcg/kg/hr, Intravenous, Titrated  epinephrine, 4 mcg/min, Intravenous, Continuous  fentaNYL, 75 mcg/hr, Intravenous, Continuous  heparin (porcine), 3-20 Units/kg/hr (Order-Specific), Intravenous, Titrated  norepinephrine, 1-30 mcg/min, Intravenous, Titrated  sodium bicarbonate infusion, 100 mL/hr, Intravenous, Continuous  sodium chloride, 5 mL/hr, Intravenous, Continuous  vasopressin (PITRESSIN) in 0 9 % sodium chloride 100 mL, 0 04 Units/min, Intravenous, Continuous      PRN Meds:  acetaminophen, 975 mg, Oral, Q8H PRN  fentanyl citrate (PF), 50 mcg, Intravenous, Q1H PRN  heparin (porcine), 2,000 Units, Intravenous, Q1H PRN  heparin (porcine), 4,000 Units, Intravenous, Q1H PRN        IP CONSULT TO ACUTE CARE SURGERY  IP CONSULT TO CASE MANAGEMENT  IP CONSULT TO VASCULAR SURGERY  IP CONSULT TO PHARMACY    Network Utilization Review Department  ATTENTION: Please call with any questions or concerns to [phone] and carefully listen to the prompts so that you are directed to the right person  All voicemails are confidential   Annie Key all requests for admission clinical reviews, approved or denied determinations and any other requests to dedicated fax number below belonging to the campus where the patient is receiving treatment   List of dedicated fax numbers for the Facilities:  [address] DENIALS (Administrative/Medical Necessity) [phone]   [address] (Maternity/NICU/Pediatrics) [phone]   [address] Cliff Eastman 0161 (Ector Diez) [address] [address] [place] [address] [place] [address] [phone]

## 2021-01-02 NOTE — PROGRESS NOTES
Progress Note - Vascular Surgery   Jarrett Escobar 77 y o  female MRN: [id number]  Unit/Bed#: [facility] 513-01 Encounter: [id number]      Subjective:  78 yo female w/ afib on eliquis, HTN, CAD, cardiomyopathy, CHF, who p/w diarrhea, RICHI, right gluteal cellulitis, and arterially placed triple lumen catheter with acute respiratory failure and shock   -950 UOP  O/n, no new complaints but hemodynamics have been worsening and pressor requirement has increased    Vitals:  BP 95/50   Pulse (!) 122   Temp 98 6 °F (37 °C) (Axillary)   Resp (!) 43   Wt 80 3 kg (177 lb 0 5 oz)   SpO2 91%   BMI 25 40 kg/m²     I/Os:  I/O last 3 completed shifts:   In: 5196 1 [I V :3596 1; IV Piggyback:1600]  Out: 1140 [Urine:1140]  I/O this shift:  In: 3038 2 [I V :2638 2; IV Piggyback:400]  Out: 460 [Urine:460]    Lab Results and Cultures:   CBC with diff:   Lab Results   Component Value Date    WBC 30 07 (HH) 01/02/2021    HGB 10 1 (L) 01/02/2021    HCT 30 5 (L) 01/02/2021    MCV 93 01/02/2021     01/02/2021    MCH 30 8 01/02/2021    MCHC 33 1 01/02/2021    RDW 16 6 (H) 01/02/2021    MPV 10 5 01/02/2021    NRBC 0 01/01/2021   ,   BMP/CMP:  Lab Results   Component Value Date    SODIUM 139 01/02/2021    K 4 1 01/02/2021    K 4 9 04/27/2018     01/02/2021     04/27/2018    CO2 20 (L) 01/02/2021    CO2 22 01/02/2021    ANIONGAP 11 9 04/27/2018    BUN 52 (H) 01/02/2021    BUN 19 04/27/2018    CREATININE 1 24 01/02/2021    CREATININE 0 88 04/27/2018    GLUCOSE 230 (H) 01/02/2021    GLUCOSE 103 07/02/2015    CALCIUM 8 6 01/02/2021    CALCIUM 9 6 04/27/2018    AST 50 (H) 01/02/2021    AST 13 04/27/2018    ALT 28 01/02/2021    ALT 13 04/27/2018    ALKPHOS 125 (H) 01/02/2021    ALKPHOS 68 04/27/2018    PROT 7 0 04/27/2018    BILITOT 0 6 04/27/2018    EGFR 43 01/02/2021   ,   Lipid Panel: No results found for: CHOL,   Coags:   Lab Results   Component Value Date    PTT 52 (H) 01/01/2021    INR 2 74 (H) 01/01/2021    INR 1 04 06/10/2015   ,     Blood Culture:   Lab Results   Component Value Date    BLOODCX Received in Microbiology Lab  Culture in Progress   01/01/2021   ,   Urinalysis:   Lab Results   Component Value Date    COLORU Dk Yellow 01/01/2021    COLORU Yellow 06/10/2015    CLARITYU Cloudy 01/01/2021    CLARITYU Clear 06/10/2015    SPECGRAV 1 019 01/01/2021    SPECGRAV 1 020 06/10/2015    PHUR 5 0 01/01/2021    PHUR 6 0 06/10/2015    LEUKOCYTESUR Small (A) 01/01/2021    LEUKOCYTESUR Negative 06/10/2015    NITRITE Negative 01/01/2021    NITRITE Negative 06/10/2015    PROTEINUA Negative 06/10/2015    GLUCOSEU Negative 01/01/2021    GLUCOSEU Negative 06/10/2015    KETONESU Trace (A) 01/01/2021    KETONESU Negative 06/10/2015    BILIRUBINUR Negative 01/01/2021    BILIRUBINUR Negative 06/10/2015    BLOODU Large (A) 01/01/2021    BLOODU Negative 06/10/2015   ,   Urine Culture: No results found for: URINECX,   Wound Culure: No results found for: WOUNDCULT    Medications:  Current Facility-Administered Medications   Medication Dose Route Frequency    acetaminophen (TYLENOL) tablet 975 mg  975 mg Oral Q8H PRN    amiodarone (CORDARONE) 900 mg in dextrose 5 % 500 mL infusion  1 mg/min Intravenous Continuous    Followed by   [place] amiodarone (CORDARONE) 900 mg in dextrose 5 % 500 mL infusion  0 5 mg/min Intravenous Continuous    atorvastatin (LIPITOR) tablet 40 mg  40 mg Oral Daily With Dinner    cefepime (MAXIPIME) 1,000 mg in dextrose 5 % 50 mL IVPB  1,000 mg Intravenous Q12H    chlorhexidine (PERIDEX) 0 12 % oral rinse 15 mL  15 mL Swish & Spit Q12H [facility]    dexmedetomidine (PRECEDEX) 400 mcg in sodium chloride 0 9 % 100 mL infusion  0 1-0 7 mcg/kg/hr Intravenous Titrated    EPINEPHrine 6000 mcg (DOUBLE CONCENTRATION) IV in sodium chloride 0 9% 250 mL  4 mcg/min Intravenous Continuous    famotidine (PEPCID) injection 20 mg  20 mg Intravenous Q24H ROYER    fentaNYL 1000 mcg in sodium chloride 0 9% 100mL infusion  75 mcg/hr Intravenous Continuous    fentanyl citrate (PF) 100 MCG/2ML 50 mcg  50 mcg Intravenous Q1H PRN    heparin (porcine) 25,000 units in 0 45% NaCl 250 mL infusion (premix)  3-20 Units/kg/hr (Order-Specific) Intravenous Titrated    heparin (porcine) injection 2,000 Units  2,000 Units Intravenous Q1H PRN    heparin (porcine) injection 4,000 Units  4,000 Units Intravenous Q1H PRN    hydrocortisone sodium succinate (PF) (Solu-CORTEF) injection 50 mg  50 mg Intravenous Q6H [address]    insulin lispro (HumaLOG) 100 units/mL subcutaneous injection 2-12 Units  2-12 Units Subcutaneous Q6H [address]    melatonin tablet 6 mg  6 mg Oral HS    metroNIDAZOLE (FLAGYL) IVPB (premix) 500 mg 100 mL  500 mg Intravenous Q8H    norepinephrine (LEVOPHED) 8 mg (DOUBLE CONCENTRATION) IV in sodium chloride 0 9% 250 mL  1-30 mcg/min Intravenous Titrated    sodium bicarbonate 150 mEq in dextrose 5 % 1,000 mL infusion  100 mL/hr Intravenous Continuous    sodium chloride 0 9 % infusion  5 mL/hr Intravenous Continuous    vancomycin (VANCOCIN) 1,250 mg in sodium chloride 0 9 % 250 mL IVPB  15 mg/kg Intravenous Q24H    vasopressin (PITRESSIN) 20 Units in sodium chloride 0 9 % 100 mL infusion  0 04 Units/min Intravenous Continuous       Physical Exam:   GENERAL APPEARANCE: awake, alert  NEURO: stable  HEENT: normocephalic, atraumatic  Right intraarterial central line in place  CV: irregular rhythm,  Tachycardia to 130,   LUNGS: clear to auscultation bilaterally  GI: soft, nontender, nondistended  : no abnormality detected  MSK: no abnormality detected   SKIN: right gluteal cellulitis is worse than before by comparison with chart      Assessment:  78 yo female w/ afib on eliquis, HTN, CAD, cardiomyopathy, CHF, who p/w diarrhea, RICHI, right gluteal cellulitis, and arterially placed triple lumen catheter with acute respiratory failure and shock     Plan:  Continue heparin drip  Continue IVF@ 5cc/hr through the intraarterial central line to maintain patency  General Surgery plan likely for right gluteal cellulitis  Will address removal of right intraarterial catheter after sepsis is resolved    Timothy Sullivan MD  1/2/2021

## 2021-01-02 NOTE — PROGRESS NOTES
Progress Note - General Surgery   Laura Espitia 77 y o  female MRN: [id number]  Unit/Bed#: [facility] 513-01 Encounter: [id number]    Assessment:  78 yo female w/ afib on eliquis, HTN, CAD, cardiomyopathy, CHF, who p/w diarrhea, RICHI, right gluteal cellulitis, and arterially placed triple lumen catheter with acute respiratory failure and shock   -950 UOP    Plan:  Insetting of worsening appearance of gluteal wound, increasing pressor requirements, new lactic acidosis, will likely take to the OR for R gluteal exploration to r/o nec fasc or an underlying abscess  Ventilated  Bicarb gtt- may need CVVH today   Wean pressors as tolerated  Vanc/cefepime/flagyl  Stress dose steroids  Vascular surgery following for intra-arterial catheter   Primary per MICU    Subjective/Objective   Chief Complaint:     Subjective: [email or web address], Levo@30, Vaso 0 04  Afebrile  WBC increasing to 30K  Persistently acidotic on bicarb gtt  Still awake and following commands  Objective:     Blood pressure 95/50, pulse (!) 118, temperature 98 2 °F (36 8 °C), temperature source Axillary, resp  rate 22, weight 80 3 kg (177 lb 0 5 oz), SpO2 95 %  ,Body mass index is 25 4 kg/m²  Intake/Output Summary (Last 24 hours) at 1/2/2021 0557  Last data filed at 1/2/2021 0401  Gross per 24 hour   Intake 7021 83 ml   Output 1500 ml   Net 5521 83 ml       Invasive Devices     Central Venous Catheter Line            CVC Central Lines 12/31/20 Triple 20cm 1 day          Peripheral Intravenous Line            Peripheral IV 12/31/20 Left Leg 1 day          Arterial Line            Arterial Line 01/01/21 Radial 1 day          Drain            NG/OG/Enteral Tube Orogastric 18 Fr Center mouth less than 1 day    Urethral Catheter Non-latex 16 Fr  less than 1 day          Airway            ETT  Cuffed; Hi-Lo 8 mm less than 1 day                Physical Exam: NAD  Atraumatic/normocephalic  Intubated/ventilated  Awake, following commands, light sedation  Normal mood and affect  Normal respiratory effort, AC 15/450/50%/8  Soft, non tender, ND  Skin warm/dry  Cap refil <2 sec

## 2021-01-02 NOTE — ANESTHESIA POSTPROCEDURE EVALUATION
Post-Op Assessment Note    CV Status:  Stable  Pain Score: 0    Pain management: adequate     Mental Status:  Awake and arousable   Hydration Status:  Euvolemic and stable   PONV Controlled:  None   Airway Patency:  Patent  Airway: intubated      Post Op Vitals Reviewed: Yes      Staff: CRNA   Comments: report to ICU staff        No complications documented      BP (!) 82/64 (01/02/21 0858)    Temp      Pulse (!) 136 (01/02/21 0858)   Resp 19 (01/02/21 0858)    SpO2 99 % (01/02/21 0858)

## 2021-01-02 NOTE — RESPIRATORY THERAPY NOTE
RT Ventilator Management Note  Rita Anderson 77 y o  female MRN: [id number]  Unit/Bed#: [facility] 513-01 Encounter: [id number]    Resp Comments: Vent settings changed per order of JARETT to: [facility] 20/450/70%/10 PEEP       01/02/21 1105   Vent Information   Vent ID [id number]   Vent type     Vent Mode AC/VC+   $ Pulse Oximetry Spot Check Charge Completed   SpO2 95 %   AC/VC+ Settings   Resp Rate (BPM) 20 BPM   VT (mL) 450 mL   Insp Time (S) 0 8 S   FIO2 (%) 70 %   PEEP (cmH2O) 10 cmH2O   Rise Time (%) 50 %   Trigger Sensitivity Flow (LPM) 3 LPM   Humidification Heater   Heater Temp 98 6 °F (37 °C)   AC/VC+ Actuals   Resp Rate (BPM) 22 BPM   VT (mL) 483 mL   MV (Obs) 9 75   MAP (cmH2O) 14 cmH2O   Peak Pressure (cmH2O) 23 cmH2O   I:E Ratio (Obs) 1:2 6   Static Compliance (mL/cmH20) 28 mL/cmH2O   Plateau Pressure (cm H2O) 26 cm H2O   Heater Temperature (Obs) 98 2 °F (36 8 °C)   AC/VC+ ALARMS   High Peak Pressure (cmH2O) 40 cmH2O   Low Peak Pressure (cmH2O) 0 cm H2O   High Resp Rate (BPM) 40 BPM   High MV (L/min) 20 L/min   Low MV (L/min) 4 L/min   High VT (mL) 1000 mL   Low VT (mL) 300 mL   High Hailey VTE (mL) 1000 mL   Low Hailey VTE (mL) 350 mL   High Spont VTE (mL) 1000 mL   Low Spont VTE (mL) 300 mL   High Insp Hailey VT (mL) 1000 mL   AC/VC+ Apnea Settings   Resp Rate (BPM) 20 BPM   VT (mL) 450 mL   FIO2 (%) 100 %   Apnea Time (s) 20 S   Apnea Flow (L/min) 60 L/min   Maintenance   Alarm (pink) cable attached No   Resuscitation bag with peep valve at bedside Yes   Water bag changed No   Circuit changed No   ETT  Cuffed; Hi-Lo 8 mm   Placement Date/Time: 01/01/21 (c) 0850   Preoxygenated: Yes  Type: Cuffed; Hi-Lo  Tube Size: 8 mm  Location: Oral  Insertion attempts: 1  Placement Verification: End tidal CO2; Auscultation;Symmetrical chest wall movement;Fiberoptic visualization  Secur       Secured at (cm) 22   Measured from Teeth   Secured Location Left   Secured by Commercial tube grey   Site Condition Dry   HI-LO Suction Intermittent suction   HI-LO Secretions Scant; White   HI-LO Intervention Patent

## 2021-01-02 NOTE — PROGRESS NOTES
Daily Progress Note - [address] 77 y o  female MRN: [id number]  [facility] En[facility]: [id number]        ----------------------------------------------------------------------------------------  HPI/24hr events:   · Worsened hypotension in evening  Increased epinephrine to 4mcg/kg/min, 1 amp bicarb, 1 amp calcium chloride  · Worsened metabolic acidosis, changed IVF to bicarb infusion   · Continued to have increased vasopressor requirements  · New a fib with RVR, given amio bolus and gtt   ---------------------------------------------------------------------------------------  SUBJECTIVE  Unable to provide verbal complaints  Able to indicate answers to yes/no questions  Denies pain, nausea  Admits to shortness of breath when flat  Denies anxiety    Review of Systems   Unable to perform ROS: Intubated     Review of systems was reviewed and negative unless stated above in HPI/24-hour events   ---------------------------------------------------------------------------------------  Assessment and Plan  Principal Problem:    Septic shock ([facility] [address] )  Active Problems:    Persistent atrial fibrillation (HCC)    Chronic systolic congestive heart failure (HCC)    Hyponatremia    Cellulitis of buttock    Diarrhea    [facility]    Hyperbilirubinemia  Resolved Problems:    * No resolved hospital problems  *      Neuro:   · No acute issues   ? Sleep/wake cycle regulation   ? Add melatonin 6mg qHS   ? CAM-ICU BID  ? Routine neuro checks   · Analgesia/sedation  ? Continuous infusions  ? Fentanyl 75mcg/hr  ? Fentanyl started for buttock pain   ? Increase overnight to facilitate less hypotension associated with propofol/preceded  ? Precedex   ? Titrated to goal RASS 0 to -1  ? PRN medications  ? Fentanyl 50mcg q1h PRN  ? 2 doses / 24 hours      CV:   · Mixed etiology shock   ? Septic in the setting of cardiomyopathy   ? Adequately fluid resuscitated, now volume overloaded  ?  CO would likely benefit from diuresis, although in the setting of sepsis, lasix has been witheld  ? Epinephrine 4mcg/kg/hr   ? Increased overnight for worsening hypotension and new lactic acidosis  ? Consider left IJ TLC to facilitate checking SvO2   ? Can consider Vigileo FloTrac, although patient is in A fib   ? Vasopressin 0 04 units/min  ? Levophed  ? Titrate to goal MAP > 65  ? Continue stress dose steroids   ? Hydrocortisone 50mg q6h   ? Maintain central line  ? Insert arterial line   ? Holding home anti-hypertensive medications  ? Metoprolol succinate 50mg q24h   ? Lisinopril 5mg daily   · Persistent atrial fibrillation/A fib with RVR   ? Likely worsened due to epinephrine, although unlikely to tolerate milrinone for inotropic support   ? Holding home metoprolol succinate 50mcg   ? Goal HR < 110  ? Optimize electrolytes K > 4 0, mag > 2 0  ? Continue to closely monitor telemetry  ? Holding home Eliquis 2 5mg BID  ? Heparin drip   · Central line complication (aortic placement)  ? Vascular surgery consulted, although may need CT surgery evaluation   ? Start heparin gtt  ? Will run slow IVF to prevent thrombus formation   · Acute on chronic systolic CHF/severe MR/mod TR  ? 1/1/21 Echo: EF 20-25%, severe diffuse hypokinesis, akinesis of entire interior wall, RV normal, severe MR, mild-mod TR   ? BNP: 10,680 (1/2/21)   ? Holding home diuretics:   ? Lasix 20mg BID  ? Metolazone 2 5mg daily   ? Consider lasix vs CRRT today    ? Unlikely to make progress from ventilator or hemodynamic standpoint with significant volume overload   · Hyperlipidemia  ? Atorvastatin 40mg qHS   ? Hold if LFT's > 3x upper limit of normal     Pulm:  · Acute hypoxic respiratory failure  ? Multifactorial from volume overload, pulmonary edema, possible new infiltrate seen on CXR   ? Mechanical ventilation day #2  ? Maintain VT 6-8ml/kg IBW: 68 5 kg  ? Maintain SpO2 >90% given cardiomyopathy and propensity for arrhythmia   ?  Not appropriate for SBT this AM   ? Suction as needed and closely monitor secretions  Maintain HOB >30 degrees  Q4h oral care with chlorhexidine BID  ? Monitor peak and plateau airway pressures  Maintain Ppeak < 45cm H2O, Pplat < 30cm   · Resolved COVID-19     ? No new interventions      GI:   · Diarrhea  ? C diff negative  ? Giardia, fecal leukocytes not sent  ? Check today when enough stool has been collected   ? No dedicated abdominal imaging to evaluate for signs of enterocolitis  ? Continue to monitor stool output   ? Continue to monitor abdominal exam    · Abnormal LFT's  ? Secondary to hypotension and hypoperfusion   ? Continue to monitor CMP daily   · Stress ulcer prophylaxis  ? Famotidine 20mg IV q24h for creatinine clearance < 50  · Bowel regimen  ? Holding secondary to diarrhea, which has now slowed       :   · RICHI on CKD3  ? Baseline creatinine: 0 91 - 1 20  ? Admission creatinine: 2 64  ? Current creatinine 1 24  ? Suspect pre-renal and ATN component from hypovolemia, sepsis, hypotension, ACE-I  ? Maintain perkins catheter   ? Q2h I/O monitoring   ? Will likely need nephrology consultation today with possible CRRT   ? Continue to follow renal function tests  · Normal anion gap metabolic acidosis  ? Albumin-corrected AG 15-16  ? Likely secondary to diarrhea, possible RTA  · Lactic acidosis  ? Secondary to low-output state  ? Possibly also worsened sepsis  ? Continue to trend until clear   ? Continues to worsen due to worsened shock   ? Unfortunately, unable to check SvO2      F/E/N:   · Fluids  ? Maintenance fluids: Bicarb in D5%W 100ml/hr   ? Diuresis plan: Carefully attempt lasix today  · Electrolytes  ? Hyponatremia - resolved   ? Replete electrolytes with as needed to maintain K >4 0, Mag >2 0, Phos >3 0  · Nutrition  ? NPO due to high vasopressor requirements      Heme/Onc:   · Anemia   ? Baseline hemoglobin: 13 4 - 10 8  ? Admission hemoglobin: 11 0  ? Current hemoglobin: 10 1  ? No sign of bleeding  ?  Transfuse for hemoglobin <7 0  ? Baseline platelets: WNL  ? Current platelets: 429  ? Transfuse for platelets < 07T or < 50k in the presence of bleeding   · History breast cancer  ? S/p right mastectomy  ? Holding home Arimidex 1mg daily   · VTE prophylaxis:  Heparin gtt , SCD's to BLE     Endo:   · Steroid-induced hyperglucemia  ? Last hemoglobin A1c unknown  ? Checking this AM   ? Increase sliding scale algorithm per persistent hyperglycemia   ? Adjust insulin regimen as needed to maintain goal -180        ID:   · Cellulitis of right buttock  ? 12/31/20 CT pelvis: Subcutaneous edema of the right gluteal region, consistent with cellulitis in the appropriate clinical setting   No focal fluid collection to suggest the presence of abscess   No soft tissue gas identified  ? Acute care surgery consulted  Ongoing discussions regarding worsening shock  Now with worsened procal and unknown source control  Plan for OR today   ? Continue vanco/cefepime/flagyl IV  ? Repeat blood cultures   ? 12/31/20 BCXx2: PENDING  ? 1/1/21 BCXx2: PENDING   ? 1/1/21 C diff: Negative   ? Procalcitonin: 25 71  ? Continue to monitor fever and WBC curve   ? Continue to monitor wound      MSK/Skin:   · Right buttock excoriation  · Reposition q2h, eliminate pressure points while in bed  · Close skin surveillance     Disposition: Continue Critical Care   Code Status: Level 1 - Full Code  ---------------------------------------------------------------------------------------  Invasive Devices Review  Invasive Devices     Central Venous Catheter Line            CVC Central Lines 12/31/20 Triple 20cm 1 day          Peripheral Intravenous Line            Peripheral IV 12/31/20 Left Leg 1 day          Arterial Line            Arterial Line 01/01/21 Radial less than 1 day          Drain            NG/OG/Enteral Tube Orogastric 18 Fr Center mouth less than 1 day    Urethral Catheter Non-latex 16 Fr  less than 1 day          Airway            ETT  Cuffed; Hi-Lo 8 mm less than 1 day              Can any invasive devices be discontinued today? No  ---------------------------------------------------------------------------------------  OBJECTIVE    Vitals   Vitals:    21 2320 21 2347 21 0000 21 0100   BP:       Pulse: (!) 110  (!) 110 (!) 112   Resp: (!) 41  20 21   Temp:   98 2 °F (36 8 °C)    TempSrc:   Axillary    SpO2: 96% 97% 97% 97%     Temp (24hrs), Av 3 °F (37 4 °C), Min:98 2 °F (36 8 °C), Max:102 2 °F (39 °C)  Current: Temperature: 98 2 °F (36 8 °C)      Invasive/non-invasive ventilation settings   Respiratory    Lab Data (Last 4 hours)       0138            pH, Arterial       7 286           pCO2, Arterial       39 0           pO2, Arterial       87 9           HCO3, Arterial       18 2           Base Excess, Arterial       -7 9                O2/Vent Data       2347   Most Recent         Vent Mode AC/VC+  AC/VC+      Resp Rate (BPM) (BPM) 15  15      VT (mL) (mL) 450  450      Insp Time (S) (S) 0 8  0 8      FIO2 (%) (%) 60  60      PEEP (cmH2O) (cmH2O) 8  8      Rise Time (%) (%) 50  50      MV (Obs) 12 2  12 2                  Physical Exam  Vitals signs reviewed  Constitutional:       Interventions: She is sedated, intubated and restrained  HENT:      Head: Normocephalic and atraumatic  Mouth/Throat:      Lips: Pink  Eyes:      Conjunctiva/sclera: Conjunctivae normal       Pupils: Pupils are equal, round, and reactive to light  Neck:      Musculoskeletal: Neck supple  Cardiovascular:      Rate and Rhythm: Tachycardia present  Rhythm irregularly irregular  Occasional extrasystoles are present  Pulses:           Radial pulses are 2+ on the right side and 2+ on the left side  Dorsalis pedis pulses are 1+ on the right side and 1+ on the left side  Heart sounds: Murmur present  Systolic murmur present  Pulmonary:      Effort: Tachypnea present  She is intubated        Breath sounds: Examination of the right-lower field reveals decreased breath sounds  Examination of the left-lower field reveals decreased breath sounds  Decreased breath sounds present  No wheezing or rhonchi  Comments: Bibasilar crackles  Strong cough, bloody secretions   Chest:      Breasts:         Right: Absent  Abdominal:      General: Bowel sounds are decreased  Palpations: Abdomen is soft  Tenderness: There is no abdominal tenderness  Genitourinary:     Comments: Nguyen: clear, yellow urine   Musculoskeletal:      Right lower le+ Pitting Edema present  Left lower le+ Pitting Edema present  Skin:     General: Skin is cool  Capillary Refill: Capillary refill takes more than 3 seconds  Neurological:      General: No focal deficit present  Mental Status: She is lethargic  GCS: GCS eye subscore is 3  GCS verbal subscore is 1  GCS motor subscore is 6  Motor: Motor function is intact  Psychiatric:         Behavior: Behavior is cooperative             Laboratory and Diagnostics:  Results from last 7 days   Lab Units 21  0027 21  2332 21  0205 20  1207   WBC Thousand/uL  --   --  23 68* 21 23*   HEMOGLOBIN g/dL  --  10 0* 10 5* 11 0*   I STAT HEMOGLOBIN g/dl 9 9*  --   --   --    HEMATOCRIT %  --   --  32 0* 34 0*   HEMATOCRIT, ISTAT % 29*  --   --   --    PLATELETS Thousands/uL  --   --  333 298   NEUTROS PCT %  --   --  85* 84*   MONOS PCT %  --   --  10 9     Results from last 7 days   Lab Units 21  0027 21  2127 21  1750 21  0747 21  0205 20  2205 20  1207   SODIUM mmol/L  --  133* 136 133* 131* 128* 132*   POTASSIUM mmol/L  --  3 6 3 9 4 0 3 4* 3 9 5 6*   CHLORIDE mmol/L  --  105 108 103 101 96* 98*   CO2 mmol/L  --  17* 16* 17* 19* 17* 22   CO2, I-STAT mmol/L 22  --   --   --   --   --   --    ANION GAP mmol/L  --  11 12 13 11 15* 12   BUN mg/dL  --  51* 53* 56* 58* 61* 62*   CREATININE mg/dL  --  1 30 1 35* 1 58* 1 91* 2 15* 2 64*   CALCIUM mg/dL  --  8 0* 7 8* 8 4 8 8 8 3 9 3   GLUCOSE RANDOM mg/dL  --  210* 211* 224* 145* 160* 157*   ALT U/L  --   --   --   --  15  --  18   AST U/L  --   --   --   --  21  --  20   ALK PHOS U/L  --   --   --   --  133*  --  142*   ALBUMIN g/dL  --   --   --   --  2 2*  --  2 5*   TOTAL BILIRUBIN mg/dL  --   --   --   --  1 68*  --  1 30*     Results from last 7 days   Lab Units 21  2127 21  0747 21  0205   MAGNESIUM mg/dL 2 7* 2 5 1 1*   PHOSPHORUS mg/dL  --   --  3 4      Results from last 7 days   Lab Units 21  2332 21  1750 21  0939 21  0205   INR   --   --   --  2 74*   PTT seconds 52* 84* 42* 39*      Results from last 7 days   Lab Units 21  0205 20  1207   TROPONIN I ng/mL 0 02 <0 02     Results from last 7 days   Lab Units 21  2332 21  1100 21  0205   LACTIC ACID mmol/L 2 1* 1 8 1 6     ABG:  Results from last 7 days   Lab Units 21  0138   PH ART  7 286*   PCO2 ART mm Hg 39 0   PO2 ART mm Hg 87 9   HCO3 ART mmol/L 18 2*   BASE EXC ART mmol/L -7 9   ABG SOURCE  Line, Arterial     VBG:  Results from last 7 days   Lab Units 21  0330   ABG SOURCE  Line, Arterial     Results from last 7 days   Lab Units 21  0508 21  0205   PROCALCITONIN ng/ml 11 45* 6 90*       Micro  Results from last 7 days   Lab Units 21  0508 21  0406 21  0347 20  1816 20  1737   BLOOD CULTURE  Received in Microbiology Lab  Culture in Progress  Received in Microbiology Lab  Culture in Progress  --  Received in Microbiology Lab  Culture in Progress  Received in Microbiology Lab  Culture in Progress  C DIFF TOXIN B BY PCR   --   --  Negative  --   --        EKG: A fib with RVR    Imagin20 CXR: Worsening vascular congestion and LLL consolidation  I have personally reviewed pertinent reports     and I have personally reviewed pertinent films in PACS    Intake and Output  I/O        8771 - 01/01 0700 01/01 0701 - 01/02 0700 01/02 0701 - 01/03 0700    P  O   0     I V  (mL/kg) 765 7 5468 7 (68 1)     NG/GT  0     IV Piggyback 300 1700     Total Intake(mL/kg) 1065 7 7168 7 (89 3)     Urine (mL/kg/hr) 550 1050 (0 5)     Emesis/NG output  0     Stool 0 0     Total Output 550 1050     Net +515 7 +6118 7            Unmeasured Stool Occurrence 1 x 1 x           UOP: 30-50 ml/hr     Height and Weights         There is no height or weight on file to calculate BMI  Weight (last 2 days)     None            Nutrition       Diet Orders   (From admission, onward)             Start     Ordered    01/01/21 0128  Diet NPO; Sips with meds  Diet effective now     Question Answer Comment   Diet Type NPO    NPO Except: Sips with meds    RD to adjust diet per protocol?  Yes        01/01/21 0130                  Active Medications  Scheduled Meds:  Current Facility-Administered Medications   Medication Dose Route Frequency Provider Last Rate    acetaminophen  975 mg Oral Q8H PRN Tatyana Spivey CRNP      atorvastatin  40 mg Oral Daily With GayleuthRENNY      cefepime  1,000 mg Intravenous Q12H Tatyana Lathams CRNP 1,000 mg (01/02/21 0250)    chlorhexidine  15 mL Swish & Spit Q12H [address] RENNY Dwyer      dexmedetomidine  0 1-0 7 mcg/kg/hr Intravenous Titrated Tatyana Lathams, CRNP 0 2 mcg/kg/hr (01/01/21 2321)    epinephrine  4 mcg/min Intravenous Continuous Tatyana Yeisons, CRNP 4 mcg/min (01/02/21 0252)    famotidine  20 mg Intravenous Q24H [address] Lazaro Borrero PA-C      fentaNYL  75 mcg/hr Intravenous Continuous Tatyana Hagmarces, CRNP 75 mcg/hr (01/01/21 2335)    fentanyl citrate (PF)  50 mcg Intravenous Q1H PRN Lazaro Borrero PA-C      heparin (porcine)  3-20 Units/kg/hr (Order-Specific) Intravenous Titrated Tatyana Yeisons, CRNP 18 Units/kg/hr (01/02/21 0028)    heparin (porcine)  2,000 Units Intravenous Q1H PRN Tatyana Spivey, CRNP      heparin (porcine)  4,000 Units Intravenous Q1H PRN , CRNP      hydrocortisone sodium succinate  50 mg Intravenous Q6H [address] , CRNP      insulin lispro  1-6 Units Subcutaneous Q6H [address] Merrilyn Failing, [place]      metroNIDAZOLE  500 mg Intravenous Q8H , CRNP 500 mg (01/02/21 0159)    norepinephrine  1-30 mcg/min Intravenous Titrated , CRNP 22 mcg/min (01/02/21 0252)    propofol  5-50 mcg/kg/min Intravenous Titrated Merrilyn Failing, PA-C 10 mcg/kg/min (01/01/21 2206)    sodium bicarbonate infusion  100 mL/hr Intravenous Continuous , CRNP 100 mL/hr (01/01/21 2047)    sodium chloride  5 mL/hr Intravenous Continuous , CRNP 5 mL/hr (01/01/21 0320)    vancomycin  15 mg/kg Intravenous Q24H , CRNP Stopped (01/01/21 1341)    vasopressin (PITRESSIN) in 0 9 % sodium chloride 100 mL  0 04 Units/min Intravenous Continuous Dav Torres MD Stopped (01/02/21 0110)     Continuous Infusions:  dexmedetomidine, 0 1-0 7 mcg/kg/hr, Last Rate: 0 2 mcg/kg/hr (01/01/21 2321)  epinephrine, 4 mcg/min, Last Rate: 4 mcg/min (01/02/21 0252)  fentaNYL, 75 mcg/hr, Last Rate: 75 mcg/hr (01/01/21 2335)  heparin (porcine), 3-20 Units/kg/hr (Order-Specific), Last Rate: 18 Units/kg/hr (01/02/21 0028)  norepinephrine, 1-30 mcg/min, Last Rate: 22 mcg/min (01/02/21 0252)  propofol, 5-50 mcg/kg/min, Last Rate: 10 mcg/kg/min (01/01/21 2206)  sodium bicarbonate infusion, 100 mL/hr, Last Rate: 100 mL/hr (01/01/21 2047)  sodium chloride, 5 mL/hr, Last Rate: 5 mL/hr (01/01/21 0320)  vasopressin (PITRESSIN) in 0 9 % sodium chloride 100 mL, 0 04 Units/min, Last Rate: Stopped (01/02/21 0110)      PRN Meds:   acetaminophen, 975 mg, Q8H PRN  fentanyl citrate (PF), 50 mcg, Q1H PRN  heparin (porcine), 2,000 Units, Q1H PRN  heparin (porcine), 4,000 Units, Q1H PRN        Allergies   Allergies   Allergen Reactions    Celecoxib Other (See Comments)    Other Other (See Comments)     Skin irritated and opened up     ---------------------------------------------------------------------------------------  Care Time Delivered:   Upon my evaluation, this patient had a high probability of imminent or life-threatening deterioration due to worsening shock, cardiomyopathy, volume overload, a fib with RVR, septic shock, which required my direct attention, intervention, and personal management  I have personally provided 55 minutes ([phone] of critical care time, exclusive of procedures, teaching, family meetings, and any prior time recorded by providers other than myself  RENNY Adamson        Portions of the record may have been created with voice recognition software  Occasional wrong word or "sound a like" substitutions may have occurred due to the inherent limitations of voice recognition software    Read the chart carefully and recognize, using context, where substitutions have occurred

## 2021-01-02 NOTE — QUICK NOTE
Surgery Update    Right buttock re-examined, wound appears darker/purple (possibly from vasoconstriction) and excoriated areas are peeling off  Cellulitis still remains within marked boarders  No pain out of proportion  Remains on 3 pressors               Shiela Celaya MD  PGY-4 General Surgery

## 2021-01-02 NOTE — RESPIRATORY THERAPY NOTE
RT Ventilator Management Note  Minnie Potter 77 y o  female MRN: [id number]  Unit/Bed#: [facility] 850-27 Encounter: [id number]      Daily Screen       1/1/2021  0900             Patient safety screen outcome[de-identified]  Failed    Not Ready for Weaning due to[de-identified]  Oxygenation saturation < 90%; Underline problem not resolved;Going on Transport intubated;FiO2 >60%            Physical Exam:   Assessment Type: Assess only  Respiratory Pattern: Assisted  Chest Assessment: Chest expansion symmetrical  Bilateral Breath Sounds: (P) Coarse  Suction: ET Tube      Resp Comments: Pt continued on ACVC+ vent settings at this time  Peep increased to +8 overnight and FiO2 titrated as tolerated  No other changes made  Will continue to monitor and wean as able

## 2021-01-02 NOTE — PROGRESS NOTES
Vancomycin IV Pharmacy-to-Dose Consultation    Dossijie Keane is a 77 y o  female who is currently receiving Vancomycin IV with management by the Pharmacy Consult service  Assessment/Plan:  The patient was reviewed  Renal function is stable and no signs or symptoms of nephrotoxicity and/or infusion reactions were documented in the chart  Based on todays assessment, continue current vancomycin (day # 3) dosing of 1250mg iv q24h, with a plan for trough to be drawn at 1230 on 1/4/21  We will continue to follow the patients culture results and clinical progress daily      Pili Martin, Pharmacist

## 2021-01-02 NOTE — RESPIRATORY THERAPY NOTE
RT Ventilator Management Note  Priya Thurston 77 y o  female MRN: [id number]  Unit/Bed#: [facility] 310-26 Encounter: [id number]      Daily Screen       1/2/2021  0735 1/2/2021  1105          Patient safety screen outcome[de-identified]  Failed  Failed      Not Ready for Weaning due to[de-identified]  Hemodynamically unstable;Going on Transport intubated; Underline problem not resolved  Hemodynamically unstable;FiO2 >60%;PEEP > 8cmH2O;Underline problem not resolved              Physical Exam:          Resp Comments: Patient continues on AC/VC+ settings 20/450/60%/10 PEEP  FIO2 decreased to 60%  Patient is awake and alert following commands

## 2021-01-02 NOTE — ANESTHESIA PREPROCEDURE EVALUATION
Procedure:  INCISION AND DRAINAGE (I&D) BUTTOCK (N/A Buttocks)    Relevant Problems   CARDIO   (+) Benign essential hypertension   (+) Chronic systolic congestive heart failure (HCC)   (+) Mitral regurgitation   (+) Mitral valve insufficiency   (+) Persistent atrial fibrillation (HCC)      /RENAL   (+) Acute kidney injury (HCC)   (+) Acute renal failure superimposed on stage 3a chronic kidney disease (HCC)      NEURO/PSYCH   (+) History of 2019 novel coronavirus disease (COVID-19)   (+) History of breast cancer      PULMONARY   (+) Acute respiratory failure with hypoxia and hypercapnia (HCC)   (+) Chronic respiratory failure with hypoxia, on home oxygen therapy (HCC)   (+) Pneumonia due to COVID-19 virus   LEFT VENTRICLE:  The ventricle was mildly dilated  Systolic function was severely reduced  Ejection fraction was estimated in the range of 20 % to 25 %  There was severe diffuse hypokinesis  There was akinesis of the entire inferior wall(s)    Severe MR  Right TLC previously placed into Right carotid artery: being followed by Vascular     Septic, on pressors, poor prognosis  Physical Exam    Airway    Mallampati score: II  TM Distance: >3 FB  Neck ROM: full     Dental       Cardiovascular      Pulmonary      Other Findings        Anesthesia Plan  ASA Score- 4 Emergent    Anesthesia Type- general with ASA Monitors  Additional Monitors:   Airway Plan: ETT  Comment: ETT in situ, poor prognosis  Plan Factors-    Induction-     Postoperative Plan-     Informed Consent-   I personally reviewed this patient with the CRNA  Discussed and agreed on the Anesthesia Plan with the CRNA  Jessy Redmond

## 2021-01-02 NOTE — CASE MANAGEMENT
Late entry: Pt was transferred on 1/1/21 to [facility] prior to being opened by NAYA

## 2021-01-02 NOTE — QUICK NOTE
Postop check  Patient seen following return from the operating room where she underwent right buttock incision, drainage and exploration with labial counter incision to rule out necrotizing soft tissue infection  A small amount of sanguinopurulent fluid was encountered intraoperatively and cultured, however fascial planes were noted to be intact and the wound was packed  At present, the patient is sedated and intubated cannot relay any symptoms to me, however on inspection it does not appear that there are any worsening cellulitic changes or progressive erythema of the buttock or perineal region  The patient remains on 3 vasopressors  Levophed at 28  Epinephrine at 4  Vasopressin at 0 04    The patient continues to receive antibiotic therapy at this time, however based on the intraoperative findings it does not appear that the soft tissue infection can explain her current course    Most recent arterial blood gas notable for worsening hypoxia, decreased pH    Leukocytosis continues to increase, now 34 6 from 30 07    No growth to date on blood cultures, no immediate preliminary results from operative wound cultures today available    Examination  General:  Intubated, sedated  HEENT:  Normocephalic, atraumatic  Cardiovascular:  Tachycardic, holosystolic murmur  Respiratory:  Intubated assist control, 15, 450, 50, 8  Abdomen:  Soft, nondistended  Buttock, groin, perineum:  Wound is currently packed, no expansive erythema or new drainage evident, no evidence of cutaneous changes progressing, Nguyen catheter in place draining dark rene  urine  Extremities:  No deformity, cool  Neuro:  Sedated, withdraws to pain, nonverbal, eyes do not open to stimuli, GCS 6T  Skin:  Cool, dry    Assessment  80-year-old female in refractory shock continuing to require vasopressor support    Etiology of shock less likely necrotizing soft tissue infection given negative intraoperative findings and stable postoperative appearance of buttock and perineal wounds    Plan  -continue vasopressor support  -continue ventilatory support  -management as per ICU  -will follow-up all microbiologic results, especially wound cultures obtained today  -surgery to follow

## 2021-01-02 NOTE — OP NOTE
OPERATIVE REPORT  PATIENT NAME: Mike Moran    :  1945  MRN: [id number]  Pt Location: [facility] OR ROOM 08    SURGERY DATE: 2021    Surgeon(s) and Role:     * Oanh Cody MD - Primary     * Alan Jasso MD - Assisting     * Marly Hall MD - Assisting    Preop Diagnosis:  Cellulitis of right buttock [L03 317]    Post-Op Diagnosis Codes:     * Cellulitis of right buttock [O36 [id number]]    Procedure(s) (LRB):  INCISION AND DRAINAGE (I&D) BUTTOCK (N/A)    Specimen(s):  ID Type Source Tests Collected by Time Destination   A : buttock abcess Tissue Buttock ANAEROBIC CULTURE AND GRAM STAIN, FUNGAL CULTURE, CULTURE, TISSUE AND GRAM STAIN, AFB CULTURE WITH STAIN [facility] 2021 5689        Estimated Blood Loss:   Minimal    Drains:  NG/OG/Enteral Tube Orogastric 18 Fr Center mouth (Active)   Placement Reverification Auscultation 21 0401   Site Assessment Clean;Dry; Intact 21 08   Status Suction-low continuous 21 08   Drainage Appearance Bile;Brown 21 08   Intake (mL) 0 mL 21 08   Output (mL) 0 mL 21 0800   Number of days: 1       Urethral Catheter Non-latex 16 Fr   (Active)   Reasons to continue Urinary Catheter  Accurate I&O assessment in critically ill patients (48 hr  max) 21 0800   Goal for Removal Voiding trial when ambulation improves 21 0800   Site Assessment Clean;Skin intact 21 08   Collection Container Standard drainage bag 21 0800   Securement Method Securing device (Describe) 21 08   Output (mL) 100 mL 21 0601   Number of days: 1       Anesthesia Type:   General    Operative Indications:  Cellulitis of right buttock [L03 317]      Operative Findings:  Sanginopurulent fluid expressed from wound, no dead tissue, tracks from buttock to labia on the right side  Wound packed with 2 kerlix   Cultures taken   Buttock wound: [id number]  Labial counter incision: 7 [id number]    Complications:   None    Procedure and Technique:  Patient was taken back to the operating room and identified both visually and by hospital identified arm band  Athrombic pumps were placed  The patient was placed in lithotomy on the operating table and antibiotics were up to day  The patient was already intubated from the ICU and anesthesia was induced  The operative site was prepped and draped in the usual sterile fashion with betadine  A time out was called and all present were in agreement  A vertical incision was made over the buttock extending into the groin with a 15 blade  The knife was used to dissect down though subcutaneous tissues to fascia  There was scant dark sanguinopurulent drainage, cultures were taken  No abscess cavity was found, no dead or necrotic tissue found, fascial planes were intact  A counter incision was made on the labia and wound connected, the wound tracted posterior 7cm  Buttock wound measures [id number]  Labial counter incision measures 3 [id number]  The wound was puls-a-vac'd with NS  Hemostasis was achieved with bovie electrocautery  The wounds were packed with betadine soaked Kerlix x2  Covered in a trauma ABD and mesh underwear  Sponge and instrument counts were correct  Patient was brought back to the ICU intubated and critically ill  Dr Rylee Stewart was present for the entire procedure          Patient Disposition:  Critical Care Unit    SIGNATURE: Michael Mccormick MD  DATE: January 2, 2021  TIME: 8:34 AM

## 2021-01-03 PROBLEM — E87.5 HYPERKALEMIA: Status: RESOLVED | Noted: 2020-01-01 | Resolved: 2021-01-01

## 2021-01-03 PROBLEM — E87.1 HYPONATREMIA: Status: RESOLVED | Noted: 2020-01-01 | Resolved: 2021-01-01

## 2021-01-03 PROBLEM — D64.9 ANEMIA: Status: ACTIVE | Noted: 2021-01-01

## 2021-01-03 PROBLEM — E87.2 METABOLIC ACIDOSIS: Status: ACTIVE | Noted: 2021-01-01

## 2021-01-03 PROBLEM — E87.2 LACTIC ACIDOSIS: Status: ACTIVE | Noted: 2021-01-01

## 2021-01-03 PROBLEM — J96.01 ACUTE RESPIRATORY FAILURE WITH HYPOXIA (HCC): Status: ACTIVE | Noted: 2021-01-01

## 2021-01-03 PROBLEM — N18.30 CKD (CHRONIC KIDNEY DISEASE) STAGE 3, GFR 30-59 ML/MIN (HCC): Chronic | Status: ACTIVE | Noted: 2021-01-01

## 2021-01-03 NOTE — ANESTHESIA POSTPROCEDURE EVALUATION
Post-Op Assessment Note    CV Status:  Stable  Pain Score: 1    Pain management: adequate     Mental Status:  Awake   PONV Controlled:  Controlled   Airway Patency:  Patent  Airway: intubated      Post Op Vitals Reviewed: Yes      Staff: Anesthesiologist, CRNA         No complications documented      BP      Temp      Pulse    Resp      SpO2 96 % (01/03/21 1252)

## 2021-01-03 NOTE — PROGRESS NOTES
Progress Note - Vascular Surgery   Roldan Sousa 77 y o  female MRN: [id number]  Unit/Bed#: [facility] 513-01 Encounter: [id number]      Subjective:  78 yo female w/ afib on eliquis, HTN, CAD, cardiomyopathy, CHF, who p/w diarrhea, RICHI, right gluteal cellulitis, and arterially placed triple lumen catheter with acute respiratory failure and shock     O/n, no new complaints but hemodynamics have been stable and pressor requirement has remained stable    Vitals:  BP 94/61   Pulse (!) 106   Temp 97 6 °F (36 4 °C) (Oral)   Resp 22   Wt 82 8 kg (182 lb 8 7 oz)   SpO2 97%   BMI 26 19 kg/m²     I/Os:  I/O last 3 completed shifts: In: 9153 4 [I V :7103 4; NG/GT:50; IV Piggyback:2000]  Out: 725 [Urine:700; Blood:25]  No intake/output data recorded      Lab Results and Cultures:   CBC with diff:   Lab Results   Component Value Date    WBC 35 56 (HH) 01/03/2021    HGB 8 8 (L) 01/03/2021    HCT 26 7 (L) 01/03/2021    MCV 92 01/03/2021     01/03/2021    MCH 30 3 01/03/2021    MCHC 33 0 01/03/2021    RDW 17 1 (H) 01/03/2021    MPV 11 0 01/03/2021    NRBC 0 01/03/2021   ,   BMP/CMP:  Lab Results   Component Value Date    SODIUM 139 01/03/2021    K 4 2 01/03/2021    K 4 9 04/27/2018     01/03/2021     04/27/2018    CO2 22 01/03/2021    CO2 22 01/02/2021    ANIONGAP 11 9 04/27/2018    BUN 59 (H) 01/03/2021    BUN 19 04/27/2018    CREATININE 1 59 (H) 01/03/2021    CREATININE 0 88 04/27/2018    GLUCOSE 230 (H) 01/02/2021    GLUCOSE 103 07/02/2015    CALCIUM 8 3 01/03/2021    CALCIUM 9 6 04/27/2018    AST 50 (H) 01/02/2021    AST 13 04/27/2018    ALT 28 01/02/2021    ALT 13 04/27/2018    ALKPHOS 125 (H) 01/02/2021    ALKPHOS 68 04/27/2018    PROT 7 0 04/27/2018    BILITOT 0 6 04/27/2018    EGFR 32 01/03/2021   ,   Lipid Panel: No results found for: CHOL,   Coags:   Lab Results   Component Value Date    PTT 60 (H) 01/03/2021    INR 2 30 (H) 01/03/2021    INR 1 04 06/10/2015   ,     Blood Culture:   Lab Results Component Value Date    BLOODCX No Growth at 24 hrs  01/01/2021   ,   Urinalysis:   Lab Results   Component Value Date    COLORU Dk Yellow 01/01/2021    COLORU Yellow 06/10/2015    CLARITYU Cloudy 01/01/2021    CLARITYU Clear 06/10/2015    SPECGRAV 1 019 01/01/2021    SPECGRAV 1 020 06/10/2015    PHUR 5 0 01/01/2021    PHUR 6 0 06/10/2015    LEUKOCYTESUR Small (A) 01/01/2021    LEUKOCYTESUR Negative 06/10/2015    NITRITE Negative 01/01/2021    NITRITE Negative 06/10/2015    PROTEINUA Negative 06/10/2015    GLUCOSEU Negative 01/01/2021    GLUCOSEU Negative 06/10/2015    KETONESU Trace (A) 01/01/2021    KETONESU Negative 06/10/2015    BILIRUBINUR Negative 01/01/2021    BILIRUBINUR Negative 06/10/2015    BLOODU Large (A) 01/01/2021    BLOODU Negative 06/10/2015   ,   Urine Culture: No results found for: URINECX,   Wound Culure: No results found for: WOUNDCULT    Medications:  Current Facility-Administered Medications   Medication Dose Route Frequency    acetaminophen (TYLENOL) tablet 975 mg  975 mg Oral Q8H PRN    amiodarone (CORDARONE) 900 mg in dextrose 5 % 500 mL infusion  1 mg/min Intravenous Continuous    atorvastatin (LIPITOR) tablet 40 mg  40 mg Oral Daily With Dinner    cefepime (MAXIPIME) 1,000 mg in dextrose 5 % 50 mL IVPB  1,000 mg Intravenous Q12H    chlorhexidine (PERIDEX) 0 12 % oral rinse 15 mL  15 mL Swish & Spit Q12H [address]    EPINEPHrine 6000 mcg (DOUBLE CONCENTRATION) IV in sodium chloride 0 9% 250 mL  4 mcg/min Intravenous Continuous    fentaNYL 1000 mcg in sodium chloride 0 9% 100mL infusion  75 mcg/hr Intravenous Continuous    fentanyl citrate (PF) 100 MCG/2ML 50 mcg  50 mcg Intravenous Q1H PRN    furosemide (LASIX) 500 mg infusion 50 mL  30 mg/hr Intravenous Continuous    heparin (porcine) 25,000 units in 0 45% NaCl 250 mL infusion (premix)  3-20 Units/kg/hr (Order-Specific) Intravenous Titrated    heparin (porcine) injection 2,000 Units  2,000 Units Intravenous Q1H PRN    heparin (porcine) injection 4,000 Units  4,000 Units Intravenous Q1H PRN    hydrocortisone sodium succinate (PF) (Solu-CORTEF) injection 50 mg  50 mg Intravenous Q6H [address]    insulin lispro (HumaLOG) 100 units/mL subcutaneous injection 4-20 Units  4-20 Units Subcutaneous Q6H [address]    melatonin tablet 6 mg  6 mg Oral HS    metroNIDAZOLE (FLAGYL) IVPB (premix) 500 mg 100 mL  500 mg Intravenous Q8H    milrinone (PRIMACOR) 20 mg in 100 mL infusion (premix)  0 25 mcg/kg/min Intravenous Continuous    norepinephrine (LEVOPHED) 8 mg (DOUBLE CONCENTRATION) IV in sodium chloride 0 9% 250 mL  1-30 mcg/min Intravenous Titrated    pantoprazole (PROTONIX) injection 40 mg  40 mg Intravenous Q12H [address]    polyethylene glycol (MIRALAX) packet 17 g  17 g Oral Daily PRN    senna-docusate sodium (SENOKOT S) 8 6-50 mg per tablet 1 tablet  1 tablet Oral HS    sodium chloride 0 9 % infusion  5 mL/hr Intravenous Continuous    vancomycin (VANCOCIN) 1,250 mg in sodium chloride 0 9 % 250 mL IVPB  15 mg/kg Intravenous Q24H    vasopressin (PITRESSIN) 20 Units in sodium chloride 0 9 % 100 mL infusion  0 04 Units/min Intravenous Continuous       Physical Exam:   GENERAL APPEARANCE: awake, alert  NEURO: stable  HEENT: normocephalic, atraumatic  Right intraarterial central line in place  CV: irregular rhythm,  Tachycardia to 110s,   LUNGS: clear to auscultation bilaterally  GI: soft, nontender, nondistended  : no abnormality detected  MSK: no abnormality detected   SKIN: right gluteal wound is stable      Assessment:  76 yo female w/ afib on eliquis, HTN, CAD, cardiomyopathy, CHF, who p/w diarrhea, RICHI, right gluteal cellulitis, and arterially placed triple lumen catheter with acute respiratory failure and shock     Plan:  Continue heparin drip  Continue IVF@ 5cc/hr through the intraarterial central line to maintain patency  General Surgery plan likely for right gluteal wound washout today  Will address removal of right intraarterial catheter after sepsis is resolved    Kaitlyn Martines MD  1/3/2021

## 2021-01-03 NOTE — ANESTHESIA PREPROCEDURE EVALUATION
Procedure:  DEBRIDEMENT WOUND AND DRESSING CHANGE ([address] OUT) (Right Buttocks)    Relevant Problems   CARDIO   (+) Benign essential hypertension   (+) Chronic systolic congestive heart failure (HCC)   (+) Mitral regurgitation   (+) Mitral valve insufficiency   (+) Persistent atrial fibrillation (HCC)      /RENAL   (+) Acute kidney injury (HCC)   (+) Acute renal failure superimposed on stage 3a chronic kidney disease (HCC)      NEURO/PSYCH   (+) History of 2019 novel coronavirus disease (COVID-19)   (+) History of breast cancer      PULMONARY   (+) Acute respiratory failure with hypoxia and hypercapnia (HCC)   (+) Chronic respiratory failure with hypoxia, on home oxygen therapy (HCC)   (+) Pneumonia due to COVID-19 virus      Ef 20-25, normal RV systolic function, sev MR, mod TR    Recent COVID (11/11/2020) - recovered    Right neck CVL intra-arterial, on anti-coagulation       Anesthesia Plan  ASA Score- 5     Anesthesia Type- general with ASA Monitors  Additional Monitors: arterial line  Airway Plan: ETT  Comment: General anesthesia, endotracheal tube; standard ASA monitors  Risks and benefits discussed with patient; patient consented and agrees to proceed  I saw and evaluated the patient  If seen with CRNA, we have discussed the anesthetic plan and I am in agreement that the plan is appropriate for the patient  Will place HD line in left IJ per ICU team request  multiple vasoactive and inotropic infusions, severe sepsis; high risk  Plan Factors-    Induction- intravenous  Postoperative Plan-     Informed Consent-   I personally reviewed this patient with the CRNA  Discussed and agreed on the Anesthesia Plan with the CRNA  Zacarias Mitchell

## 2021-01-03 NOTE — ANESTHESIA PROCEDURE NOTES
Temporary HD Catheter    Date/Time: 1/3/2021 12:15 PM  Performed by: Madelin Camara MD  Authorized by: Madelin Camara MD     Patient location:  OR Room  Consent:     Consent obtained: ICU team got consent  Universal protocol:     Patient identity confirmed:  Hospital-assigned identification number and arm band  Pre-procedure details:     Hand hygiene: Hand hygiene performed prior to insertion      Sterile barrier technique: All elements of maximal sterile technique followed      Skin preparation:  ChloraPrep    Skin preparation agent: Skin preparation agent completely dried prior to procedure    Indications:     Central line indications: dialysis      Site selection rationale:  Right neck has intra-arterial catheter; need to place in L IJ  Sedation:     Sedation type: ga (during surgery)  Anesthesia (see MAR for exact dosages): Anesthesia method: ga  Procedure details:     Location:  Left internal jugular    Vessel type: vein      Laterality:  Left    Approach: percutaneous technique used      Patient position:  Flat    Catheter type:  Double lumen    Catheter size:  13 5 Fr (13 fr)    Catheter length:  20 cm    Ultrasound guidance: yes      Sterile ultrasound techniques: Sterile gel and sterile probe covers were used      Number of attempts:  1    Successful placement: yes      Vessel of catheter tip end:  Central venous (sterile tubing held up to confirm CVP prior to cannulation)  Post-procedure details:     Post-procedure:  Dressing applied and line sutured    Assessment:  Blood return through all ports    Post-procedure complications: none      Patient tolerance of procedure: Tolerated well, no immediate complications    Observer:  Yes

## 2021-01-03 NOTE — QUICK NOTE
Anastacio Palacios updated via phone  We discussed patient's OR wash out, continued need for high vasopressors, mechanical ventilation with decreasing fio2/peep requirements today, intact mental status, and RICHI requiring bumex infusion with possible need for dialysis  All questions answered to her satisfaction

## 2021-01-03 NOTE — RESPIRATORY THERAPY NOTE
resp care      01/03/21 8608   Respiratory Assessment   Resp Comments pt placed back on ACVC+ settings, pt stating she can't breathe  Plans of OR transport at 10am  Will attempt wean when pt gets back  RN aware   ETT  Cuffed; Hi-Lo 8 mm   Placement Date/Time: 01/01/21 (c) 0815   Preoxygenated: Yes  Type: Cuffed; Hi-Lo  Tube Size: 8 mm  Location: Oral  Insertion attempts: 1  Placement Verification: End tidal CO2; Auscultation;Symmetrical chest wall movement;Fiberoptic visualization  Secur       Secured at (cm) 22   Measured from Teeth   Secured Location Right   Secured by Commercial tube grey   Site Condition Dry   Cuff Pressure (cm H2O) 25 cm H2O   HI-LO Suction  Intermittent suction   HI-LO Secretions Small   HI-LO Intervention Patent   Additional Assessments   SpO2 98 %

## 2021-01-03 NOTE — RESPIRATORY THERAPY NOTE
RT Ventilator Management Note  Rita Anderson 77 y o  female MRN: [id number]  Unit/Bed#: [facility] 728-93 Encounter: [id number]      Daily Screen       1/2/2021  1105 1/3/2021  0718          Patient safety screen outcome[de-identified]  Failed  Passed      Not Ready for Weaning due to[de-identified]  Hemodynamically unstable;FiO2 >60%;PEEP > 8cmH2O;Underline problem not resolved  --      Spont breathing trial % for 30 min:  --  --              Physical Exam:   Assessment Type: Assess only  General Appearance: Awake  Respiratory Pattern: Assisted  Chest Assessment: Chest expansion symmetrical  Bilateral Breath Sounds: Diminished, Coarse  Suction: ET Tube      Resp Comments: (P) pt placed on CPAP/PS 5/8 40% at this time  Pt tolerating well no issues noted, RR & VT all WNL  Spo2 97% at this time  per NP lower peep to 8, plans of OR later this am  no accessory muscle use, tube in proper position and no skin breakdown noted   bs diminished/coarse pt suctioned for small bloody secretions

## 2021-01-03 NOTE — CONSULTS
Consultation - Nephrology   Mike Prime 77 y o  female MRN: [id number]  Unit/Bed#: [facility] 513-01 Encounter: [id number]      ASSESSMENT & PLAN       RICHI (POA) likely secondary to hypotensive ischemic ATN in the setting of sepsis and congestive heart failure  o Baseline creatinine seems to be closer to 0 9-1 1 and creatinine is increased to 1 59  o Oliguric now requiring norepinephrine and vasopressin, I inotropic support as well has not improved overall urine output  o She currently is on furosemide 30 mg daily  o Urinalysis from January 1st is dark yellow with a specific gravity of 1 019 trace ketones 1+ protein 4-10 wbc's occasional bacteria and innumerable RBCs  o She remains intubated and has a weight that is increasing with a significantly positive fluid balance  o Will obtain a renal ultrasound now--> in July patient had multiple bilateral renal cysts few appear hemorrhagic and proteinaceous with no hydronephrosis--> need to evaluate further cystic kidney disease  o Would optimize blood pressure with a map that is above 65  o Change furosemide to Bumex give 4 mg IV x1 and start drip at 2 mg an hour with 10 mg of metolazone if no urine output by this afternoon than because of volume overload oliguria acidosis will initiate CRRT  o Consent for CRRT was obtained from the patient she is alert and oriented although remains intubated and she was agreeable to procedure benefits and risks were discussed in detail  o Check urine protein to creatinine ratio, check SPEP, check UPEP in the setting of anemia, check complement levels in the setting of sepsis     Electrolytes/Acid Base  o Elevated lactate with an anion gap metabolic acidosis     Blood Pressure  o Hypotension- in the setting of shock requiring norepinephrine vasopressor Milrinone  o Tachycardia     Anemia of CKD  o Is trending down words with a hemoglobin now of 8 point  o In the setting of critical illness     Volume overload with decompensated heart failure  o Diuretics as above  o On Milrinone  o Extremities cool  o Elevated LFT, hepatic congestion     Sepsis, shock with cellulitis  o To the emergency room for washout buttock wound    HISTORY OF PRESENT ILLNESS:  Requesting Physician: Kerry Monreal DO  Reason for Consult:  Acute kidney injury    Jasper Keane is a 77y o  year old female who was admitted for buttock wound cellulitis she has a history low output congestive heart failure baseline creatinine around 0 9 to 1 2, subsequently had worsening shortness of breath required intubation and will further decompensated from a heart failure standpoint, she was started on Milrinone and IV diuretics for low urine output unfortunately urine output continues to be low, chest x-ray also concern for potential pneumonia and now going for washout of buttock wounds    She is awake and alert does not have any family but is intubated nods her head appropriately to questions she reports no pain at this time    PAST MEDICAL HISTORY:  Past Medical History:   Diagnosis Date    A-fib [facility])     Breast cancer ([facility] [address] )     s/p right mastectomy    CHF (congestive heart failure) ([facility] [address] )     Coronary artery disease     Dilated cardiomyopathy ([facility] [address] )     Essential (primary) hypertension     Hx of echocardiogram 06/05/2015    EF0 45 (45%) mild mitral regurg   / 2D w/CFD; EF0 43 (43%) mild MR 11/25/16    Hypertension     Mitral regurgitation     Non Hodgkin's lymphoma ([facility] [address] )     Nonrheumatic mitral (valve) insufficiency     Obesity     Persistent atrial fibrillation ([facility] [address] )     Renal disorder        PAST SURGICAL HISTORY:  Past Surgical History:   Procedure Laterality Date    MASTECTOMY Right 2018    TOTAL KNEE ARTHROPLASTY Right 2013       ALLERGIES:  Allergies   Allergen Reactions    Celecoxib Other (See Comments)    Other Other (See Comments)     Skin irritated and opened up       SOCIAL HISTORY:  Social History     Substance and Sexual Activity   Alcohol Use Never    Frequency: Never     Social History     Substance and Sexual Activity   Drug Use Never     Social History     Tobacco Use   Smoking Status Former Smoker   Smokeless Tobacco Never Used       FAMILY HISTORY:  Family History   Problem Relation Age of Onset    Heart disease Mother     Heart disease Father     Breast cancer Cousin        MEDICATIONS:    Current Facility-Administered Medications:     acetaminophen (TYLENOL) tablet 975 mg, 975 mg, Oral, Q8H PRN, Leif Franco MD    amiodarone (CORDARONE) 900 mg in dextrose 5 % 500 mL infusion, 1 mg/min, Intravenous, Continuous, ANTELMO OneillNP, Last Rate: 33 3 mL/hr at 01/03/21 0347, 1 mg/min at 01/03/21 0347    atorvastatin (LIPITOR) tablet 40 mg, 40 mg, Oral, Daily With Dinner, Leif Franco MD    bumetanide (BUMEX) 12 5 mg infusion 50 mL, 2 mg/hr, Intravenous, Continuous, Nargis Spironello V, CRNP, Last Rate: 8 mL/hr at 01/03/21 0838, 2 mg/hr at 01/03/21 0838    bumetanide (BUMEX) injection 4 mg, 4 mg, Intravenous, Once, Nargis Spironello V, CRNP    calcium gluconate 2 g in sodium chloride 0 9% 100 mL (premix), 2 g, Intravenous, Once, Vinicius King V, CRNP, Last Rate: 100 mL/hr at 01/03/21 0839, 2 g at 01/03/21 0839    cefepime (MAXIPIME) 1,000 mg in dextrose 5 % 50 mL IVPB, 1,000 mg, Intravenous, Q12H, Leif Franco MD, Stopped at 01/03/21 0600    chlorhexidine (PERIDEX) 0 12 % oral rinse 15 mL, 15 mL, Swish & Spit, Q12H [facility], Leif Franco MD, 15 mL at 01/03/21 0807    EPINEPHrine 6000 mcg (DOUBLE CONCENTRATION) IV in sodium chloride 0 9% 250 mL, 4 mcg/min, Intravenous, Continuous, Leif Franco MD, Last Rate: 10 mL/hr at 01/03/21 0346, 4 mcg/min at 01/03/21 0346    fentaNYL 1000 mcg in sodium chloride 0 9% 100mL infusion, 75 mcg/hr, Intravenous, Continuous, Leif Franco MD, Last Rate: 7 5 mL/hr at 01/03/21 0402, 75 mcg/hr at 01/03/21 0402    fentanyl citrate (PF) 100 MCG/2ML 50 mcg, 50 mcg, Intravenous, Q1H PRN, Hai Corrales MD, 50 mcg at 01/01/21 2256    heparin (porcine) 25,000 units in 0 45% NaCl 250 mL infusion (premix), 3-20 Units/kg/hr (Order-Specific), Intravenous, Titrated, aHi Corrales MD, Last Rate: 15 mL/hr at 01/02/21 1921, 20 Units/kg/hr at 01/02/21 1921    heparin (porcine) injection 2,000 Units, 2,000 Units, Intravenous, Q1H PRN, Hai Corrales MD, 2,000 Units at 01/02/21 1152    heparin (porcine) injection 4,000 Units, 4,000 Units, Intravenous, Q1H PRN, Hai Corrales MD, 4,000 Units at 01/01/21 1146    hydrocortisone sodium succinate (PF) (Solu-CORTEF) injection 50 mg, 50 mg, Intravenous, Q6H [address], Hai Corrales MD, 50 mg at 01/03/21 0807    insulin lispro (HumaLOG) 100 units/mL subcutaneous injection 4-20 Units, 4-20 Units, Subcutaneous, Q6H [address], 8 Units at 01/03/21 0619 **AND** Fingerstick Glucose (POCT), , , Q6H, RENNY Quezada    melatonin tablet 6 mg, 6 mg, Oral, HS, Hai Corrales MD, 6 mg at 01/02/21 2147    metolazone (ZAROXOLYN) tablet 10 mg, 10 mg, Oral, Daily, Nargis Bravo VRENNY, 10 mg at 01/03/21 0807    metroNIDAZOLE (FLAGYL) IVPB (premix) 500 mg 100 mL, 500 mg, Intravenous, Q8H, Hai Corrales MD, Last Rate: 200 mL/hr at 01/03/21 0906, 500 mg at 01/03/21 0906    milrinone (PRIMACOR) 20 mg in 100 mL infusion (premix), 0 25 mcg/kg/min, Intravenous, Continuous, RENNY Quezada, Last Rate: 6 mL/hr at 01/03/21 0353, 0 25 mcg/kg/min at 01/03/21 0353    norepinephrine (LEVOPHED) 8 mg (DOUBLE CONCENTRATION) IV in sodium chloride 0 9% 250 mL, 1-30 mcg/min, Intravenous, Titrated, Hai Corrales MD, Last Rate: 22 5 mL/hr at 01/03/21 0841, 12 mcg/min at 01/03/21 0841    pantoprazole (PROTONIX) injection 40 mg, 40 mg, Intravenous, Q12H ROYER, RENNY Quezada, 40 mg at 01/03/21 0807    polyethylene glycol (MIRALAX) packet 17 g, 17 g, Oral, Daily PRN, RENNY Jolley    senna-docusate sodium (SENOKOT S) 8 6-50 mg per tablet 1 tablet, 1 tablet, Oral, HS, RENNY Quezada    sodium chloride 0 9 % infusion, 5 mL/hr, Intravenous, Continuous, Yoshi Reddy MD, Last Rate: 5 mL/hr at 01/01/21 0320, 5 mL/hr at 01/01/21 0320    vancomycin (VANCOCIN) 1,250 mg in sodium chloride 0 9 % 250 mL IVPB, 15 mg/kg, Intravenous, Q24H, Yoshi Reddy MD, Stopped at 01/02/21 1621    vasopressin (PITRESSIN) 20 Units in sodium chloride 0 9 % 100 mL infusion, 0 04 Units/min, Intravenous, Continuous, Yoshi Reddy MD, Last Rate: 12 mL/hr at 01/03/21 0351, 0 04 Units/min at 01/03/21 0351    REVIEW OF SYSTEMS:  All the systems were reviewed and were negative except as documented on the HPI  PHYSICAL EXAM:  Current Weight: Weight - Scale: 82 8 kg (182 lb 8 7 oz)  First Weight: Weight - Scale: 80 3 kg (177 lb 0 5 oz)  Vitals:    01/03/21 0600 01/03/21 0718 01/03/21 0754 01/03/21 0800   BP:       BP Location:       Pulse: (!) 106   (!) 112   Resp: 22   (!) 27   Temp:    97 5 °F (36 4 °C)   TempSrc:    Oral   SpO2: 98% 97% 98% 98%   Weight: 82 8 kg (182 lb 8 7 oz)          Intake/Output Summary (Last 24 hours) at 1/3/2021 9486  Last data filed at 1/3/2021 0800  Gross per 24 hour   Intake 5593 62 ml   Output 225 ml   Net 5368 62 ml     General:  Critically ill  Eyes:  Pallor  ENT:  ET tube in place  Neck: supple, no JVD  Chest:  Coarse breath sounds  CVS: normal rate, regular rhythm  Abdomen:  Distended  Extremities:  Positive lower extremity edema  Skin: no rash  Neuro: awake, alert, oriented      Invasive Devices:   Urethral Catheter Non-latex 16 Fr   (Active)   Reasons to continue Urinary Catheter  Accurate I&O assessment in critically ill patients (48 hr  max) 01/02/21 1627   Goal for Removal Voiding trial when ambulation improves 01/02/21 1600   Site Assessment Clean;Skin intact 01/03/21 0400   Collection Container Standard drainage bag 01/03/21 0400   Securement Method Tape 01/03/21 0400   Output (mL) 35 mL 01/03/21 0800     Lab Results: Results from last 7 days   Lab Units 01/03/21  0430 01/03/21  0410 01/02/21  2235 01/02/21  1720 01/02/21  1001 01/02/21  0338 01/02/21  0330 01/02/21  0027 01/01/21  2332 01/01/21  2127  01/01/21  2194 01/01/21  0747 01/01/21  0508 01/01/21  0406 01/01/21  0205  12/31/20  1816 12/31/20  1737 12/31/20  1207   WBC Thousand/uL 35 56*  --   --   --  34 67* 30 07*  --   --   --   --   --   --   --   --   --  23 68*  --   --   --  21 23*   HEMOGLOBIN g/dL 8 8*  --   --   --  10 0* 10 1*  --   --  10 0*  --   --   --   --   --   --  10 5*  --   --   --  11 0*   I STAT HEMOGLOBIN g/dl  --   --   --   --   --   --   --  9 9*  --   --   --   --   --   --   --   --   --   --   --   --    HEMATOCRIT % 26 7*  --   --   --  30 1* 30 5*  --   --   --   --   --   --   --   --   --  32 0*  --   --   --  34 0*   HEMATOCRIT, ISTAT %  --   --   --   --   --   --   --  29*  --   --   --   --   --   --   --   --   --   --   --   --    PLATELETS Thousands/uL 303  --   --   --  415* 389  --   --   --   --   --   --   --   --   --  333  --   --   --  298   POTASSIUM mmol/L  --  4 2 3 7 3 7 3 6  --  4 1  --   --  3 6   < >  --  4 0  --   --  3 4*   < >  --   --  5 6*   CHLORIDE mmol/L  --  107 104 106 108  --  108  --   --  105   < >  --  103  --   --  101   < >  --   --  98*   CO2 mmol/L  --  22 22 23 22  --  20*  --   --  17*   < >  --  17*  --   --  19*   < >  --   --  22   CO2, I-STAT mmol/L  --   --   --   --   --   --   --  22  --   --   --   --   --   --   --   --   --   --   --   --    BUN mg/dL  --  59* 52* 53* 51*  --  52*  --   --  51*   < >  --  56*  --   --  58*   < >  --   --  62*   CREATININE mg/dL  --  1 59* 1 49* 1 33* 1 33*  --  1 24  --   --  1 30   < >  --  1 58*  --   --  1 91*   < >  --   --  2 64*   CALCIUM mg/dL  --  8 3 8 6 7 9* 8 1*  --  8 6  --   --  8 0*   < >  --  8 4  --   --  8 8   < >  --   --  9 3   MAGNESIUM mg/dL 2 9*  --   --  2 9*  --  2 5  --   --   --  2 7*  --   --  2 5  --   --  1 1*   < >  --   -- --    PHOSPHORUS mg/dL 4 1  --   --   --   --  5 0*  --   --   --   --   --   --   --   --   --  3 4  --   --   --   --    ALK PHOS U/L 100  --   --   --   --  125*  --   --   --   --   --   --   --   --   --  133*  --   --   --  142*   ALT U/L 928*  --   --   --   --  28  --   --   --   --   --   --   --   --   --  15  --   --   --  18   AST U/L 2,025*  --   --   --   --  50*  --   --   --   --   --   --   --   --   --  21  --   --   --  20   GLUCOSE, ISTAT mg/dl  --   --   --   --   --   --   --  230*  --   --   --   --   --   --   --   --   --   --   --   --    BLOOD CULTURE   --   --   --   --   --   --   --   --   --   --   --   --   --  No Growth at 24 hrs  No Growth at 48 hrs   --   --  No Growth at 24 hrs  No Growth at 24 hrs   --    NITRITE UA   --   --   --   --   --   --   --   --   --   --   --  Negative  --   --   --   --   --   --   --   --    BLOOD UA   --   --   --   --   --   --   --   --   --   --   --  Large*  --   --   --   --   --   --   --   --    LEUKOCYTES UA   --   --   --   --   --   --   --   --   --   --   --  Small*  --   --   --   --   --   --   --   --     < > = values in this interval not displayed  Portions of the record may have been created with voice recognition software  Occasional wrong word or "sound a like" substitutions may have occurred due to the inherent limitations of voice recognition software  Read the chart carefully and recognize, using context, where substitutions have occurred  If you have any questions, please contact the dictating provider

## 2021-01-03 NOTE — PLAN OF CARE
Problem: Potential for Falls  Goal: Patient will remain free of falls  Description: INTERVENTIONS:  - Assess patient frequently for physical needs  -  Identify cognitive and physical deficits and behaviors that affect risk of falls    -  [place] fall precautions as indicated by assessment   - Educate patient/family on patient safety including physical limitations  - Instruct patient to call for assistance with activity based on assessment  - Modify environment to reduce risk of injury  - Consider OT/PT consult to assist with strengthening/mobility  Outcome: Progressing     Problem: Prexisting or High Potential for Compromised Skin Integrity  Goal: Skin integrity is maintained or improved  Description: INTERVENTIONS:  - Identify patients at risk for skin breakdown  - Assess and monitor skin integrity  - Assess and monitor nutrition and hydration status  - Monitor labs   - Assess for incontinence   - Turn and reposition patient  - Assist with mobility/ambulation  - Relieve pressure over bony prominences  - Avoid friction and shearing  - Provide appropriate hygiene as needed including keeping skin clean and dry  - Evaluate need for skin moisturizer/barrier cream  - Collaborate with interdisciplinary team   - Patient/family teaching  - Consider wound care consult   Outcome: Progressing     Problem: PAIN - ADULT  Goal: Verbalizes/displays adequate comfort level or baseline comfort level  Description: Interventions:  - Encourage patient to monitor pain and request assistance  - Assess pain using appropriate pain scale  - Administer analgesics based on type and severity of pain and evaluate response  - Implement non-pharmacological measures as appropriate and evaluate response  - Consider cultural and social influences on pain and pain management  - Notify physician/advanced practitioner if interventions unsuccessful or patient reports new pain  Outcome: Progressing     Problem: INFECTION - ADULT  Goal: Absence or prevention of progression during hospitalization  Description: INTERVENTIONS:  - Assess and monitor for signs and symptoms of infection  - Monitor lab/diagnostic results  - Monitor all insertion sites, i e  indwelling lines, tubes, and drains  - Monitor endotracheal if appropriate and nasal secretions for changes in amount and color  - [place] appropriate cooling/warming therapies per order  - Administer medications as ordered  - Instruct and encourage patient and family to use good hand hygiene technique  - Identify and instruct in appropriate isolation precautions for identified infection/condition  Outcome: Progressing  Goal: Absence of fever/infection during neutropenic period  Description: INTERVENTIONS:  - Monitor WBC    Outcome: Progressing     Problem: SAFETY ADULT  Goal: Patient will remain free of falls  Description: INTERVENTIONS:  - Assess patient frequently for physical needs  -  Identify cognitive and physical deficits and behaviors that affect risk of falls    -  [place] fall precautions as indicated by assessment   - Educate patient/family on patient safety including physical limitations  - Instruct patient to call for assistance with activity based on assessment  - Modify environment to reduce risk of injury  - Consider OT/PT consult to assist with strengthening/mobility  Outcome: Progressing  Goal: Maintain or return to baseline ADL function  Description: INTERVENTIONS:  -  Assess patient's ability to carry out ADLs; assess patient's baseline for ADL function and identify physical deficits which impact ability to perform ADLs (bathing, care of mouth/teeth, toileting, grooming, dressing, etc )  - Assess/evaluate cause of self-care deficits   - Assess range of motion  - Assess patient's mobility; develop plan if impaired  - Assess patient's need for assistive devices and provide as appropriate  - Encourage maximum independence but intervene and supervise when necessary  - Involve family in performance of ADLs  - Assess for home care needs following discharge   - Consider OT consult to assist with ADL evaluation and planning for discharge  - Provide patient education as appropriate  Outcome: Progressing  Goal: Maintain or return mobility status to optimal level  Description: INTERVENTIONS:  - Assess patient's baseline mobility status (ambulation, transfers, stairs, etc )    - Identify cognitive and physical deficits and behaviors that affect mobility  - Identify mobility aids required to assist with transfers and/or ambulation (gait belt, sit-to-stand, lift, walker, cane, etc )  - [place] fall precautions as indicated by assessment  - Record patient progress and toleration of activity level on Mobility SBAR; progress patient to next Phase/Stage  - Instruct patient to call for assistance with activity based on assessment  - Consider rehabilitation consult to assist with strengthening/weightbearing, etc   Outcome: Progressing     Problem: DISCHARGE PLANNING  Goal: Discharge to home or other facility with appropriate resources  Description: INTERVENTIONS:  - Identify barriers to discharge w/patient and caregiver  - Arrange for needed discharge resources and transportation as appropriate  - Identify discharge learning needs (meds, wound care, etc )  - Arrange for interpretive services to assist at discharge as needed  - Refer to Case Management Department for coordinating discharge planning if the patient needs post-hospital services based on physician/advanced practitioner order or complex needs related to functional status, cognitive ability, or social support system  Outcome: Progressing     Problem: Knowledge Deficit  Goal: Patient/family/caregiver demonstrates understanding of disease process, treatment plan, medications, and discharge instructions  Description: Complete learning assessment and assess knowledge base    Interventions:  - Provide teaching at level of understanding  - Provide teaching via preferred learning methods  Outcome: Progressing     Problem: SAFETY,RESTRAINT: NV/NON-SELF DESTRUCTIVE BEHAVIOR  Goal: Remains free of harm/injury (restraint for non violent/non self-detsructive behavior)  Description: INTERVENTIONS:  - Instruct patient/family regarding restraint use   - Assess and monitor physiologic and psychological status   - Provide interventions and comfort measures to meet assessed patient needs   - Identify and implement measures to help patient regain control  - Assess readiness for release of restraint   Outcome: Progressing  Goal: Returns to optimal restraint-free functioning  Description: INTERVENTIONS:  - Assess the patient's behavior and symptoms that indicate continued need for restraint  - Identify and implement measures to help patient regain control  - Assess readiness for release of restraint   Outcome: Progressing     Problem: CARDIOVASCULAR - ADULT  Goal: Maintains optimal cardiac output and hemodynamic stability  Description: INTERVENTIONS:  - Monitor I/O, vital signs and rhythm  - Monitor for S/S and trends of decreased cardiac output  - Administer and titrate ordered vasoactive medications to optimize hemodynamic stability  - Assess quality of pulses, skin color and temperature  - Assess for signs of decreased coronary artery perfusion  - Instruct patient to report change in severity of symptoms  Outcome: Progressing  Goal: Absence of cardiac dysrhythmias or at baseline rhythm  Description: INTERVENTIONS:  - Continuous cardiac monitoring, vital signs, obtain 12 lead EKG if ordered  - Administer antiarrhythmic and heart rate control medications as ordered  - Monitor electrolytes and administer replacement therapy as ordered  Outcome: Progressing     Problem: RESPIRATORY - ADULT  Goal: Achieves optimal ventilation and oxygenation  Description: INTERVENTIONS:  - Assess for changes in respiratory status  - Assess for changes in mentation and behavior  - Position to facilitate oxygenation and minimize respiratory effort  - Oxygen administered by appropriate delivery if ordered  - Initiate smoking cessation education as indicated  - Encourage broncho-pulmonary hygiene including cough, deep breathe, Incentive Spirometry  - Assess the need for suctioning and aspirate as needed  - Assess and instruct to report SOB or any respiratory difficulty  - Respiratory Therapy support as indicated  Outcome: Progressing     Problem: GENITOURINARY - ADULT  Goal: Maintains or returns to baseline urinary function  Description: INTERVENTIONS:  - Assess urinary function  - Encourage oral fluids to ensure adequate hydration if ordered  - Administer IV fluids as ordered to ensure adequate hydration  - Administer ordered medications as needed  - Offer frequent toileting  - Follow urinary retention protocol if ordered  Outcome: Progressing  Goal: Absence of urinary retention  Description: INTERVENTIONS:  - Assess patients ability to void and empty bladder  - Monitor I/O  - Bladder scan as needed  - Discuss with physician/AP medications to alleviate retention as needed  - Discuss catheterization for long term situations as appropriate  Outcome: Progressing  Goal: Urinary catheter remains patent  Description: INTERVENTIONS:  - Assess patency of urinary catheter  - If patient has a chronic perkins, consider changing catheter if non-functioning  - Follow guidelines for intermittent irrigation of non-functioning urinary catheter  Outcome: Progressing     Problem: METABOLIC, FLUID AND ELECTROLYTES - ADULT  Goal: Electrolytes maintained within normal limits  Description: INTERVENTIONS:  - Monitor labs and assess patient for signs and symptoms of electrolyte imbalances  - Administer electrolyte replacement as ordered  - Monitor response to electrolyte replacements, including repeat lab results as appropriate  - Instruct patient on fluid and nutrition as appropriate  Outcome: Progressing  Goal: Fluid balance maintained  Description: INTERVENTIONS:  - Monitor labs   - Monitor I/O and WT  - Instruct patient on fluid and nutrition as appropriate  - Assess for signs & symptoms of volume excess or deficit  Outcome: Progressing  Goal: Glucose maintained within target range  Description: INTERVENTIONS:  - Monitor Blood Glucose as ordered  - Assess for signs and symptoms of hyperglycemia and hypoglycemia  - Administer ordered medications to maintain glucose within target range  - Assess nutritional intake and initiate nutrition service referral as needed  Outcome: Progressing     Problem: SKIN/TISSUE INTEGRITY - ADULT  Goal: Skin integrity remains intact  Description: INTERVENTIONS  - Identify patients at risk for skin breakdown  - Assess and monitor skin integrity  - Assess and monitor nutrition and hydration status  - Monitor labs (i e  albumin)  - Assess for incontinence   - Turn and reposition patient  - Assist with mobility/ambulation  - Relieve pressure over bony prominences  - Avoid friction and shearing  - Provide appropriate hygiene as needed including keeping skin clean and dry  - Evaluate need for skin moisturizer/barrier cream  - Collaborate with interdisciplinary team (i e  Nutrition, Rehabilitation, etc )   - Patient/family teaching  Outcome: Progressing  Goal: Incision(s), wounds(s) or drain site(s) healing without S/S of infection  Description: INTERVENTIONS  - Assess and document risk factors for skin impairment   - Assess and document dressing, incision, wound bed, drain sites and surrounding tissue  - Consider nutrition services referral as needed  - Oral mucous membranes remain intact  - Provide patient/ family education  Outcome: Progressing  Goal: Oral mucous membranes remain intact  Description: INTERVENTIONS  - Assess oral mucosa and hygiene practices  - Implement preventative oral hygiene regimen  - Implement oral medicated treatments as ordered  - Initiate Nutrition services referral as needed  Outcome: Progressing

## 2021-01-03 NOTE — PROGRESS NOTES
Vancomycin Assessment    Dolly Goldstein is a 77 y o  female who is currently receiving vancomycin 1000mg iv q24 for skin-soft tissue infection     Relevant clinical data and objective history reviewed:  Creatinine   Date Value Ref Range Status   01/03/2021 1 59 (H) 0 60 - 1 30 mg/dL Final     Comment:     Standardized to IDMS reference method   01/02/2021 1 49 (H) 0 60 - 1 30 mg/dL Final     Comment:     Standardized to IDMS reference method   01/02/2021 1 33 (H) 0 60 - 1 30 mg/dL Final     Comment:     Standardized to IDMS reference method   04/27/2018 0 88 0 6 - 1 2 MG/DL Final     Comment:     IDMS method performed  The drugs Metamizole, Sulfasalazine, and Sulfapyridine may interfere and  result in false low results  07/02/2015 0 73 0 5 - 1 5 mg/dL Final     Comment:     Standardized to IDMS reference method   06/10/2015 0 77 0 60 - 1 30 mg/dL Final     Comment:     Standardized to IDMS reference method     BP 94/61   Pulse (!) 112   Temp 97 5 °F (36 4 °C) (Oral)   Resp (!) 27   Wt 82 8 kg (182 lb 8 7 oz)   SpO2 98%   BMI 26 19 kg/m²   I/O last 3 completed shifts: In: 9153 4 [I V :7103 4; NG/GT:50; IV Piggyback:2000]  Out: 725 [Urine:700; Blood:25]  Lab Results   Component Value Date/Time    BUN 59 (H) 01/03/2021 04:10 AM    BUN 19 04/27/2018 09:42 AM    WBC 35 56 (HH) 01/03/2021 04:30 AM    WBC 7 7 04/27/2018 09:42 AM    HGB 8 8 (L) 01/03/2021 04:30 AM    HGB 14 3 04/27/2018 09:42 AM    HCT 26 7 (L) 01/03/2021 04:30 AM    HCT 42 7 04/27/2018 09:42 AM    MCV 92 01/03/2021 04:30 AM    MCV 87 3 04/27/2018 09:42 AM     01/03/2021 04:30 AM     04/27/2018 09:42 AM     Temp Readings from Last 3 Encounters:   01/03/21 97 5 °F (36 4 °C) (Oral)   12/31/20 97 5 °F (36 4 °C) (Oral)   11/25/20 (!) 97 °F (36 1 °C) (Tympanic)     Vancomycin Days of Therapy: 4    Assessment/Plan  The patient is currently on vancomycin utilizing scheduled dosing  Baseline risks associated with therapy include: pre-existing renal impairment and advanced age  The patient is receiving 1000mg iv q24 with the level based on a goal of 15-20 (appropriate for most indications) ; therefore, regimen is clinically appropriate and dose will be continued   Pharmacy will continue to follow closely for s/sx of nephrotoxicity, infusion reactions, and appropriateness of therapy  BMP and CBC will be ordered per protocol  Plan for trough as patient approaches steady state, prior to the 4th  dose at approximately 1230 on 1/4/21  Pharmacy will continue to follow the patients culture results and clinical progress daily      Jose Balderrama, Pharmacist

## 2021-01-03 NOTE — RESPIRATORY THERAPY NOTE
RT Ventilator Management Note  Tatyana Beck 77 y o  female MRN: [id number]  Unit/Bed#: [facility] 461-78 Encounter: [id number]      Daily Screen       1/2/2021  0735 1/2/2021  1105          Patient safety screen outcome[de-identified]  Failed  Failed      Not Ready for Weaning due to[de-identified]  Hemodynamically unstable;Going on Transport intubated; Underline problem not resolved  Hemodynamically unstable;FiO2 >60%;PEEP > 8cmH2O;Underline problem not resolved              Physical Exam:   Assessment Type: Assess only  General Appearance: Alert, Awake  Respiratory Pattern: Assisted  Chest Assessment: Chest expansion symmetrical  Bilateral Breath Sounds: Diminished, Coarse  Suction: ET Tube      Resp Comments: Pt continued on ACVC+ vent settings overnight with peep +10 and 50%  Pt awake/alert at this time  Will continue to monitor and wean patient as able

## 2021-01-03 NOTE — PROGRESS NOTES
Daily Progress Note - [address] 77 y o  female MRN: [id number]  Unit/Bed#: [facility] 513-01 Encounter: [id number]        ----------------------------------------------------------------------------------------  HPI/24hr events:   · Minimal urine output   · No response to lasix 40mg IV  Re-bolused lasix 80mg and started drip  · Increased milrinone to 0 25  · Ongoing high-dose vasopressor requirements  · Bloody output noted in OGT  Gave protonix IV bolus and started BID   · Bicarb drip stopped for normalized serum CO2  ---------------------------------------------------------------------------------------  SUBJECTIVE  Patient unable to offer verbal complaints  Able to deny pain, nausea, short of breath  Admits to being uncomfortable  Review of Systems   Unable to perform ROS: Intubated     Review of systems was unable to be performed secondary to intubation  ---------------------------------------------------------------------------------------  Assessment and Plan  Principal Problem:    Septic shock ([facility] [address] )  Active Problems:    Persistent atrial fibrillation (HCC)    Chronic systolic congestive heart failure (HCC)    Hyponatremia    Cellulitis of buttock    Diarrhea    Acute kidney injury ([facility] [address] )    Hyperbilirubinemia  Resolved Problems:    * No resolved hospital problems  *      Neuro:   · No acute issues   ? Sleep/wake cycle regulation         § Melatonin 6mg qHS   ? CAM-ICU BID  ? Routine neuro checks   · Analgesia/sedation  ? Continuous infusions  § Fentanyl 75mcg/hr  ? PRN medications  § Fentanyl 50mcg q1h PRN  § 0 doses / 24 hours      CV:   · Mixed etiology shock   ? Septic shock in the setting of cardiomyopathy and severe MR  ? Adequately fluid resuscitated, now volume overloaded  ? Despite shock state, attempting volume removal to held improve cardiac output   ? Epinephrine 4mcg/kg/hr  ? Norepinephrine 13 mcg/min  ? Titrate to goal MAP > 65  ? Vasopressin 0 04 Units/min  ?  Milrinone 0 25 mcg/kg/min  ? Increased from 0 13 overnight to help improve cardiac output by reducing afterload in the presence of severe MR and improve renal perfusion   ? Can consider Vigileo FloTrac, although patient is in A fib   ? Unable to check SvO2 due to only femoral central access   ? Continue stress dose steroids   ? Hydrocortisone 50mg q6h   ? Maintain central line and arterial line   ? Holding home anti-hypertensive medications  ? Metoprolol succinate 50mg q24h   ? Lisinopril 5mg daily   · Persistent atrial fibrillation/A fib with RVR/NSVT  ? Amiodarone 1 mg/min  ? Continue at current dose for today   ? Holding home metoprolol succinate 50mcg   ? Goal HR < 110  ? Optimize electrolytes K > 4 0, mag > 2 0  ? Continue to closely monitor telemetry  ? Holding home Eliquis 2 5mg BID  ? Heparin drip   · Central line complication (aortic placement)  ? Vascular surgery consulted, although may need CT surgery evaluation    ? Heparin gtt  ? Slow IVF to prevent thrombus formation   · Acute on chronic systolic CHF/severe MR/mod TR  ? 1/1/21 Echo: EF 20-25%, severe diffuse hypokinesis, akinesis of entire interior wall, RV normal, severe MR, mild-mod TR   ? BNP: 28,452 (1/2/21)   ? Goal to offload LV with diuresis   ? Continue lasix gtt, although may need CRRT     ? Unlikely to make progress from ventilator or hemodynamic standpoint with significant volume overload   · Hyperlipidemia  ? Atorvastatin 40mg qHS   ? Hold if LFT's > 3x upper limit of normal     Pulm:  · Acute hypoxic respiratory failure  ? Multifactorial from volume overload, pulmonary edema  ? Mechanical ventilation day #3  ? Maintain VT 6-8ml/kg IBW: 68 5 kg  ? Maintain SpO2 >90% given cardiomyopathy and propensity for arrhythmia   ? Not appropriate for SBT this AM given hemodynamic instability   ? Suction as needed and closely monitor secretions  Maintain HOB >30 degrees  Q4h oral care with chlorhexidine BID  ? Monitor peak and plateau airway pressures   Maintain Ppeak < 45cm H2O, Pplat < 30cm   · Resolved COVID-19     ? No new interventions      GI:   · Diarrhea  ? Improved   ? C-diff negative   ? No dedicated abdominal imaging to evaluate for signs of enterocolitis  ? Continue to monitor stool output   ? Continue to monitor abdominal exam    · Abnormal LFT's  ? Secondary to hypotension and hypoperfusion   ? Check CMP [place]  · Possible upper GI bleed  ? S/p Protonix IV 80mg x1  ? Protonix IV 40mg q12h   · Bowel regimen  ? Senna/colace   ? Miralax PRN      :   · RICHI on CKD3  ? Baseline creatinine: 0 91 - 1 20  ? Admission creatinine: 2 64  ? Current creatinine 1 59  ? Suspect pre-renal and ATN component from sepsis, shock state, ACE-I   ? Maintain perkins catheter   ? Q2h I/O monitoring  ? Will likely need nephrology consultation today with possible CRRT if patient remains unresponsive to diuresis   ? Continue lasix drip  ? Increase to 30mg/hr   ? Titrate to UOP at least 100ml/hr  ? Q6h BMP  ? Continue to follow renal function tests  · Normal anion gap metabolic acidosis  ? Likely secondary to diarrhea, possible RTA  ? Bicarb drip able to be stopped   · Lactic acidosis  ? Secondary to low-output state  ? Worsened by sepsis  ? Continue to trend until clear   ? Continues to worsen due to worsened shock   ? Unfortunately, unable to check SvO2      F/E/N:   · Fluids  ? Maintenance fluids: None   ? Diuresis plan: Lasix drip   · Electrolytes   ? Replete electrolytes with as needed to maintain K >4 0, Mag >2 0, Phos >3 0  · Nutrition  ? NPO due to high vasopressor requirements      Heme/Onc:   · Anemia   ? Baseline hemoglobin: 13 4 - 10 8  ? Admission hemoglobin: 11 0  ? Current hemoglobin: 8 8  ? Serial hemoglobin q6h given possible UGIB  ? Transfuse for hemoglobin <7 0  ? Baseline platelets: WNL  ? Current platelets: 303  ? Transfuse for platelets < 13W or < 08M in the presence of bleeding   · History breast cancer  ? S/p right mastectomy  ?  Holding home Arimidex 1mg daily   · VTE prophylaxis:  Heparin gtt, SCD's to BLE     Endo:   · Steroid-induced hyperglucemia  ? Last hemoglobin A1c 6 0% on 1/2/21  ? Increase sliding scale algorithm per persistent hyperglycemia   § Currently on algorithm #5  § Trying to avoid drip due to limited IV access, although may need to transition if BGL remains elevated   ? Adjust insulin regimen as needed to maintain goal -180        ID:   · Cellulitis of right buttock  ? 12/31/20 CT pelvis: Subcutaneous edema of the right gluteal region, consistent with cellulitis in the appropriate clinical setting   No focal fluid collection to suggest the presence of abscess   No soft tissue gas identified  ? POD #1 s/p   ? Plan for second look today   ? Continue vanco/cefepime/flagyl IV  ? Despite negative MRSA cx, will keep vanco until wound cx is definitively not MRSA   ? Repeat blood cultures   ? 12/31/20 BCXx2: No growth (prelim)   ? MRSA cx: Negative   ? 1/1/21 BCXx2: No growth (prelim)   ? 1/1/21 C diff: Negative   ? 1/2/21 Tissue Cx: Rare GOC in clusters   ? Procalcitonin: 21 47  ? Continue to monitor fever and WBC curve   ? Continue to monitor wound      MSK/Skin:   · Right buttock excoriation  ? Reposition q2h, eliminate pressure points while in bed  ? Close skin surveillance     Disposition: Continue Critical Care   Code Status: Level 1 - Full Code  ---------------------------------------------------------------------------------------  Invasive Devices Review  Invasive Devices     Central Venous Catheter Line            CVC Central Lines 12/31/20 Triple 20cm 2 days          Peripheral Intravenous Line            Peripheral IV 12/31/20 Left Leg 2 days          Arterial Line            Arterial Line 01/01/21 Radial 1 day          Drain            NG/OG/Enteral Tube Orogastric 18 Fr Center mouth 1 day    Urethral Catheter Non-latex 16 Fr  1 day          Airway            ETT  Cuffed; Hi-Lo 8 mm 1 day              Can any invasive devices be discontinued today? No  ---------------------------------------------------------------------------------------  OBJECTIVE    Vitals   Vitals:    21 2257 21 2300 21 0000 21 0100   BP:       BP Location:       Pulse:  104 (!) 110 (!) 106   Resp:  20 (!) 25 (!) 24   Temp:   97 8 °F (36 6 °C)    TempSrc:   Rectal    SpO2: 95% 96% 95% 95%   Weight:         Temp (24hrs), Av 3 °F (36 8 °C), Min:97 6 °F (36 4 °C), Max:98 7 °F (37 1 °C)  Current: Temperature: 97 8 °F (36 6 °C)      Invasive/non-invasive ventilation settings   Respiratory    Lab Data (Last 4 hours)    None         O2/Vent Data (Last 4 hours)      2257           Vent Mode AC/VC+       Resp Rate (BPM) (BPM) 20       VT (mL) (mL) 450       Insp Time (S) (S) 0 9       FIO2 (%) (%) 50       PEEP (cmH2O) (cmH2O) 10       Rise Time (%) (%) 50       MV (Obs) 9 2                   Physical Exam      Laboratory and Diagnostics:  Results from last 7 days   Lab Units 21  1001 21  0338 21  0027 21  2332 21  0205 20  1207   WBC Thousand/uL 34 67* 30 07*  --   --  23 68* 21 23*   HEMOGLOBIN g/dL 10 0* 10 1*  --  10 0* 10 5* 11 0*   I STAT HEMOGLOBIN g/dl  --   --  9 9*  --   --   --    HEMATOCRIT % 30 1* 30 5*  --   --  32 0* 34 0*   HEMATOCRIT, ISTAT %  --   --  29*  --   --   --    PLATELETS Thousands/uL 415* 389  --   --  333 298   NEUTROS PCT %  --   --   --   --  85* 84*   BANDS PCT %  --  3  --   --   --   --    MONOS PCT %  --   --   --   --  10 9   MONO PCT %  --  6  --   --   --   --      Results from last 7 days   Lab Units 21  2235 21  1720 21  1001 21  0338 21  0330 21  0027 21  2127 21  1750 21  0747 21  0205  20  1207   SODIUM mmol/L 136 139 141  --  139  --  133* 136 133* 131*   < > 132*   POTASSIUM mmol/L 3 7 3 7 3 6  --  4 1  --  3 6 3 9 4 0 3 4*   < > 5 6*   CHLORIDE mmol/L 104 106 108  --  108  --  105 108 103 101   < > 98*   CO2 mmol/L 22 23 22  --  20*  --  17* 16* 17* 19*   < > 22   CO2, I-STAT mmol/L  --   --   --   --   --  22  --   --   --   --   --   --    ANION GAP mmol/L 10 10 11  --  11  --  11 12 13 11   < > 12   BUN mg/dL 52* 53* 51*  --  52*  --  51* 53* 56* 58*   < > 62*   CREATININE mg/dL 1 49* 1 33* 1 33*  --  1 24  --  1 30 1 35* 1 58* 1 91*   < > 2 64*   CALCIUM mg/dL 8 6 7 9* 8 1*  --  8 6  --  8 0* 7 8* 8 4 8 8   < > 9 3   GLUCOSE RANDOM mg/dL 194* 251* 235*  --  260*  --  210* 211* 224* 145*   < > 157*   ALT U/L  --   --   --  28  --   --   --   --   --  15  --  18   AST U/L  --   --   --  50*  --   --   --   --   --  21  --  20   ALK PHOS U/L  --   --   --  125*  --   --   --   --   --  133*  --  142*   ALBUMIN g/dL  --   --   --  2 2*  --   --   --   --   --  2 2*  --  2 5*   TOTAL BILIRUBIN mg/dL  --   --   --  1 50*  --   --   --   --   --  1 68*  --  1 30*    < > = values in this interval not displayed       Results from last 7 days   Lab Units 01/02/21  1720 01/02/21  0338 01/01/21 2127 01/01/21  0747 01/01/21  0205   MAGNESIUM mg/dL 2 9* 2 5 2 7* 2 5 1 1*   PHOSPHORUS mg/dL  --  5 0*  --   --  3 4      Results from last 7 days   Lab Units 01/03/21  0013 01/02/21  1758 01/02/21  1001 01/02/21  0618 01/01/21  2332 01/01/21  1750 01/01/21  0939 01/01/21  0205   INR   --   --   --   --   --   --   --  2 74*   PTT seconds 60* 60* 58* 60* 52* 84* 42* 39*      Results from last 7 days   Lab Units 01/01/21  0205 12/31/20  1207   TROPONIN I ng/mL 0 02 <0 02     Results from last 7 days   Lab Units 01/02/21  2235 01/02/21  1758 01/02/21  1205 01/02/21  1001 01/02/21  0618 01/02/21  0344 01/01/21  2332   LACTIC ACID mmol/L 4 6* 3 7* 4 1* 4 8* 3 2* 2 3* 2 1*     ABG:  Results from last 7 days   Lab Units 01/02/21 2235   PH ART  7 317*   PCO2 ART mm Hg 39 5   PO2 ART mm Hg 78 9   HCO3 ART mmol/L 19 8*   BASE EXC ART mmol/L -5 9   ABG SOURCE  Line, Arterial     VBG:  Results from last 7 days   Lab Units 01/02/21 2235   ABG SOURCE  Line, Arterial     Results from last 7 days   Lab Units 01/02/21  0338 01/01/21  0508 01/01/21  0205   PROCALCITONIN ng/ml 25 71* 11 45* 6 90*       Micro  Results from last 7 days   Lab Units 01/02/21  0817 01/01/21  0508 01/01/21  0406 01/01/21  0347 12/31/20  2205 12/31/20  1816 12/31/20  1737   BLOOD CULTURE   --  No Growth at 24 hrs  No Growth at 24 hrs   --   --  No Growth at 24 hrs  No Growth at 24 hrs  GRAM STAIN RESULT  No polys seen*  Rare Gram positive cocci in clusters*  --   --   --   --   --   --    MRSA CULTURE ONLY   --   --   --   --  No Methicillin Resistant Stapylococcus aureus (MRSA) after 24 hours  --   --    C DIFF TOXIN B BY PCR   --   --   --  Negative  --   --   --        EKG: A fib with RVR  Imaging: Follow up CXR this AM   I have personally reviewed pertinent reports  and I have personally reviewed pertinent films in PACS    Intake and Output  I/O       01/01 0701 - 01/02 0700 01/02 0701 - 01/03 0700 01/03 0701 - 01/04 0700    P  O  0 0     I V  (mL/kg) 5468 7 (68 1) 3701 5 (44 7)     NG/GT 0 50     IV Piggyback 1700 1350     Total Intake(mL/kg) 7168 7 (89 3) 5101 5 (61 6)     Urine (mL/kg/hr) 1050 (0 5) 195 (0 1)     Emesis/NG output 0 0     Stool 0 0     Blood  25     Total Output 1050 220     Net +6118 7 +4881 5            Unmeasured Stool Occurrence 1 x 0 x         UOP: 5-10 ml/hr     Height and Weights      IBW: -92 5 kg  Body mass index is 25 4 kg/m²  Weight (last 2 days)     Date/Time   Weight    01/02/21 0600   80 3 (177 03)    01/02/21 0500   80 3 (177 03)                Nutrition       Diet Orders   (From admission, onward)             Start     Ordered    01/01/21 0128  Diet NPO; Sips with meds  Diet effective now     Question Answer Comment   Diet Type NPO    NPO Except: Sips with meds    RD to adjust diet per protocol?  Yes        01/01/21 4020                  Active Medications  Scheduled Meds:  Current Facility-Administered Medications   Medication Dose Route Frequency Provider Last Rate    acetaminophen  975 mg Oral Q8H PRN Alleen Meigs, MD      amiodarone  1 mg/min Intravenous Continuous Rebbeca Smoker, CRNP 1 mg/min (01/02/21 2251)    atorvastatin  40 mg Oral Daily With Christine Heredia MD      cefepime  1,000 mg Intravenous Q12H Alleen Meigs, MD Stopped (01/02/21 1715)    chlorhexidine  15 mL Swish & Spit Q12H [address] Alleen Meigs, MD      epinephrine  4 mcg/min Intravenous Continuous Alleen Meigs, MD 4 mcg/min (01/02/21 0930)    fentaNYL  75 mcg/hr Intravenous Continuous Alleen Meigs, MD 75 mcg/hr (01/02/21 1834)    fentanyl citrate (PF)  50 mcg Intravenous Q1H PRN Alleen Meigs, MD      heparin (porcine)  3-20 Units/kg/hr (Order-Specific) Intravenous Titrated Alleen Meigs, MD 20 Units/kg/hr (01/02/21 1921)    heparin (porcine)  2,000 Units Intravenous Q1H PRN Alleen Meigs, MD      heparin (porcine)  4,000 Units Intravenous Q1H PRN Alleen Meigs, MD      hydrocortisone sodium succinate  50 mg Intravenous Q6H [address] Alleen Meigs, MD      insulin lispro  2-12 Units Subcutaneous Q6H [address] Alleen Meigs, MD      melatonin  6 mg Oral HS Alleen Meigs, MD      metroNIDAZOLE  500 mg Intravenous Q8H Alleen Meigs, MD Stopped (01/02/21 1813)    milrinone (PRIMACOR) infusion  0 25 mcg/kg/min Intravenous Continuous Rebbeca Smoker, CRNP 0 13 mcg/kg/min (01/02/21 1636)    norepinephrine  1-30 mcg/min Intravenous Titrated Alleen Meigs, MD 13 mcg/min (01/02/21 2147)    pantoprazole  80 mg Intravenous Once Rebbeca Smoker, CRNP      sodium chloride  5 mL/hr Intravenous Continuous Alleen Meigs, MD 5 mL/hr (01/01/21 0320)    vancomycin  15 mg/kg Intravenous Q24H Alleen Meigs, MD Stopped (01/02/21 1621)    vasopressin (PITRESSIN) in 0 9 % sodium chloride 100 mL  0 04 Units/min Intravenous Continuous Alleen Meigs, MD 0 04 Units/min (01/02/21 1923)     Continuous Infusions:  amiodarone, 1 mg/min, Last Rate: 1 mg/min (01/02/21 2251)  epinephrine, 4 mcg/min, Last Rate: 4 mcg/min (01/02/21 0930)  fentaNYL, 75 mcg/hr, Last Rate: 75 mcg/hr (01/02/21 1834)  heparin (porcine), 3-20 Units/kg/hr (Order-Specific), Last Rate: 20 Units/kg/hr (01/02/21 1921)  milrinone (PRIMACOR) infusion, 0 25 mcg/kg/min, Last Rate: 0 13 mcg/kg/min (01/02/21 1636)  norepinephrine, 1-30 mcg/min, Last Rate: 13 mcg/min (01/02/21 2147)  sodium chloride, 5 mL/hr, Last Rate: 5 mL/hr (01/01/21 0320)  vasopressin (PITRESSIN) in 0 9 % sodium chloride 100 mL, 0 04 Units/min, Last Rate: 0 04 Units/min (01/02/21 1923)      PRN Meds:   acetaminophen, 975 mg, Q8H PRN  fentanyl citrate (PF), 50 mcg, Q1H PRN  heparin (porcine), 2,000 Units, Q1H PRN  heparin (porcine), 4,000 Units, Q1H PRN        Allergies   Allergies   Allergen Reactions    Celecoxib Other (See Comments)    Other Other (See Comments)     Skin irritated and opened up     ---------------------------------------------------------------------------------------  Care Time Delivered:   Upon my evaluation, this patient had a high probability of imminent or life-threatening deterioration due to worsened RICHI and significant oliguria, persistent shock state, which required my direct attention, intervention, and personal management  I have personally provided 45 minutes (0015 [phone]) of critical care time, exclusive of procedures, teaching, family meetings, and any prior time recorded by providers other than myself  RENNY Sandoval        Portions of the record may have been created with voice recognition software  Occasional wrong word or "sound a like" substitutions may have occurred due to the inherent limitations of voice recognition software    Read the chart carefully and recognize, using context, where substitutions have occurred

## 2021-01-03 NOTE — PROGRESS NOTES
Progress Note - General Surgery   Alexander Pineda 77 y o  female MRN: [id number]  Unit/Bed#: [facility] 513-01 Encounter: [id number]      Subjective:  76 yo female w/ afib on eliquis, HTN, CAD, cardiomyopathy, CHF, who p/w diarrhea, RICHI, right gluteal cellulitis, and arterially placed triple lumen catheter with acute respiratory failure and shock     O/n, no new complaints but hemodynamics have been stable and pressor requirement has remained stable    Vitals:  BP 94/61   Pulse (!) 106   Temp 97 6 °F (36 4 °C) (Oral)   Resp 22   Wt 82 8 kg (182 lb 8 7 oz)   SpO2 97%   BMI 26 19 kg/m²     I/Os:  I/O last 3 completed shifts: In: 9153 4 [I V :7103 4; NG/GT:50; IV Piggyback:2000]  Out: 725 [Urine:700; Blood:25]  No intake/output data recorded      Lab Results and Cultures:   CBC with diff:   Lab Results   Component Value Date    WBC 35 56 (HH) 01/03/2021    HGB 8 8 (L) 01/03/2021    HCT 26 7 (L) 01/03/2021    MCV 92 01/03/2021     01/03/2021    MCH 30 3 01/03/2021    MCHC 33 0 01/03/2021    RDW 17 1 (H) 01/03/2021    MPV 11 0 01/03/2021    NRBC 0 01/03/2021   ,   BMP/CMP:  Lab Results   Component Value Date    SODIUM 139 01/03/2021    K 4 2 01/03/2021    K 4 9 04/27/2018     01/03/2021     04/27/2018    CO2 22 01/03/2021    CO2 22 01/02/2021    ANIONGAP 11 9 04/27/2018    BUN 59 (H) 01/03/2021    BUN 19 04/27/2018    CREATININE 1 59 (H) 01/03/2021    CREATININE 0 88 04/27/2018    GLUCOSE 230 (H) 01/02/2021    GLUCOSE 103 07/02/2015    CALCIUM 8 3 01/03/2021    CALCIUM 9 6 04/27/2018    AST 50 (H) 01/02/2021    AST 13 04/27/2018    ALT 28 01/02/2021    ALT 13 04/27/2018    ALKPHOS 125 (H) 01/02/2021    ALKPHOS 68 04/27/2018    PROT 7 0 04/27/2018    BILITOT 0 6 04/27/2018    EGFR 32 01/03/2021   ,   Lipid Panel: No results found for: CHOL,   Coags:   Lab Results   Component Value Date    PTT 60 (H) 01/03/2021    INR 2 30 (H) 01/03/2021    INR 1 04 06/10/2015   ,     Blood Culture:   Lab Results Component Value Date    BLOODCX No Growth at 24 hrs  01/01/2021   ,   Urinalysis:   Lab Results   Component Value Date    COLORU Dk Yellow 01/01/2021    COLORU Yellow 06/10/2015    CLARITYU Cloudy 01/01/2021    CLARITYU Clear 06/10/2015    SPECGRAV 1 019 01/01/2021    SPECGRAV 1 020 06/10/2015    PHUR 5 0 01/01/2021    PHUR 6 0 06/10/2015    LEUKOCYTESUR Small (A) 01/01/2021    LEUKOCYTESUR Negative 06/10/2015    NITRITE Negative 01/01/2021    NITRITE Negative 06/10/2015    PROTEINUA Negative 06/10/2015    GLUCOSEU Negative 01/01/2021    GLUCOSEU Negative 06/10/2015    KETONESU Trace (A) 01/01/2021    KETONESU Negative 06/10/2015    BILIRUBINUR Negative 01/01/2021    BILIRUBINUR Negative 06/10/2015    BLOODU Large (A) 01/01/2021    BLOODU Negative 06/10/2015   ,   Urine Culture: No results found for: URINECX,   Wound Culure: No results found for: WOUNDCULT    Medications:  Current Facility-Administered Medications   Medication Dose Route Frequency    acetaminophen (TYLENOL) tablet 975 mg  975 mg Oral Q8H PRN    amiodarone (CORDARONE) 900 mg in dextrose 5 % 500 mL infusion  1 mg/min Intravenous Continuous    atorvastatin (LIPITOR) tablet 40 mg  40 mg Oral Daily With Dinner    cefepime (MAXIPIME) 1,000 mg in dextrose 5 % 50 mL IVPB  1,000 mg Intravenous Q12H    chlorhexidine (PERIDEX) 0 12 % oral rinse 15 mL  15 mL Swish & Spit Q12H [address]    EPINEPHrine 6000 mcg (DOUBLE CONCENTRATION) IV in sodium chloride 0 9% 250 mL  4 mcg/min Intravenous Continuous    fentaNYL 1000 mcg in sodium chloride 0 9% 100mL infusion  75 mcg/hr Intravenous Continuous    fentanyl citrate (PF) 100 MCG/2ML 50 mcg  50 mcg Intravenous Q1H PRN    furosemide (LASIX) 500 mg infusion 50 mL  30 mg/hr Intravenous Continuous    heparin (porcine) 25,000 units in 0 45% NaCl 250 mL infusion (premix)  3-20 Units/kg/hr (Order-Specific) Intravenous Titrated    heparin (porcine) injection 2,000 Units  2,000 Units Intravenous Q1H PRN    heparin (porcine) injection 4,000 Units  4,000 Units Intravenous Q1H PRN    hydrocortisone sodium succinate (PF) (Solu-CORTEF) injection 50 mg  50 mg Intravenous Q6H [facility]    insulin lispro (HumaLOG) 100 units/mL subcutaneous injection 4-20 Units  4-20 Units Subcutaneous Q6H [facility]    melatonin tablet 6 mg  6 mg Oral HS    metroNIDAZOLE (FLAGYL) IVPB (premix) 500 mg 100 mL  500 mg Intravenous Q8H    milrinone (PRIMACOR) 20 mg in 100 mL infusion (premix)  0 25 mcg/kg/min Intravenous Continuous    norepinephrine (LEVOPHED) 8 mg (DOUBLE CONCENTRATION) IV in sodium chloride 0 9% 250 mL  1-30 mcg/min Intravenous Titrated    pantoprazole (PROTONIX) injection 40 mg  40 mg Intravenous Q12H [facility]    polyethylene glycol (MIRALAX) packet 17 g  17 g Oral Daily PRN    senna-docusate sodium (SENOKOT S) 8 6-50 mg per tablet 1 tablet  1 tablet Oral HS    sodium chloride 0 9 % infusion  5 mL/hr Intravenous Continuous    vancomycin (VANCOCIN) 1,250 mg in sodium chloride 0 9 % 250 mL IVPB  15 mg/kg Intravenous Q24H    vasopressin (PITRESSIN) 20 Units in sodium chloride 0 9 % 100 mL infusion  0 04 Units/min Intravenous Continuous       Physical Exam:   GENERAL APPEARANCE: awake, alert  NEURO: stable  HEENT: normocephalic, atraumatic  Right intraarterial central line in place  CV: irregular rhythm,  Tachycardia to 110s,   LUNGS: clear to auscultation bilaterally  GI: soft, nontender, nondistended  : no abnormality detected  MSK: no abnormality detected   SKIN: right gluteal wound is stable      Assessment:  76 yo female w/ afib on eliquis, HTN, CAD, cardiomyopathy, CHF, who p/w diarrhea, RICHI, right gluteal cellulitis, and arterially placed triple lumen catheter with acute respiratory failure and shock     Plan:  OR today for right gluteal washout  Continue antibiotics  Consider CVVH for Renal replacement therapy  Continue heparin drip  Continue IVF@ 5cc/hr through the intraarterial central line to maintain patency       Shan Trejo, MD  1/3/2021

## 2021-01-03 NOTE — OP NOTE
OPERATIVE REPORT  PATIENT NAME: Dolly Goldstein    :  1945  MRN: [id number]  Pt Location: BE OR ROOM 08    SURGERY DATE: 1/3/2021    Surgeon(s) and Role:     * Flakita Seo MD - Primary     * Faustino Daniels MD - Assisting    Preop Diagnosis:  Cellulitis of right buttock [L03 317]    Post-Op Diagnosis Codes:     * Cellulitis of right buttock [G79 743]    Procedure(s) (LRB):  DEBRIDEMENT WOUND AND DRESSING CHANGE ([address] OUT) (Right)    Specimen(s):  ID Type Source Tests Collected by Time Destination   1 : right gluteal tissue Tissue Buttock TISSUE EXAM Flakita Seo MD 1/3/2021 1216    A : right gluteal tissue Wound Buttock ANAEROBIC CULTURE AND GRAM STAIN, STAT GRAM STAIN, WOUND CULTURE Flakita Seo MD 1/3/2021 1218        Estimated Blood Loss:   Minimal    Drains:  NG/OG/Enteral Tube Orogastric 18 Fr Center mouth (Active)   Placement Reverification Auscultation 21 1200   Site Assessment Clean;Dry; Intact 21 1200   Status Suction-low continuous 21 1200   Drainage Appearance None 21 1200   Intake (mL) 0 mL 21 1200   Output (mL) 0 mL 21 1200   Number of days: 2       Urethral Catheter Non-latex 16 Fr  (Active)   Reasons to continue Urinary Catheter  Accurate I&O assessment in critically ill patients (48 hr  max) 21 1200   Goal for Removal Voiding trial when ambulation improves 21 1200   Site Assessment Clean;Skin intact 21 1200   Collection Container Standard drainage bag 21 1200   Securement Method Tape 21 1200   Output (mL) 50 mL 21 1200   Number of days: 2       Anesthesia Type:   General    Operative Indications:  Cellulitis of right buttock [D28 [id number]]      Operative Findings:  Small amount of fat necrosis and small amount of superficial necrotic tissue at wound edges that was debrided  The rest was healthy bleeding tissue  No purulence       Complications:   None    Procedure and Technique:  The patient was taken from the ICU intubated to the operating room  She was moved to the operating room table  A left IJ central line was placed by Anesthesia  The patient was prepped with Betadine, draped in a sterile manner  Time-out was called  Two Kerlix were removed from the right buttock incisions  Wound was inspected  There is no pus  There is a small amount of fat necrosis  There is a small superficial amount of necrotic tissue on either edge of the wound incisions  The skin bridge connecting the 2 prior incisions was opened was scalpel and bovie to make 1 larger incision  I then debrided superficial necrotic tissue with Bovie and scalpel  The total wound size is now 15 x 6 x 9cm  Hemostasis was controlled with Bovie  This was sent for both pathology and culture  The wound was irrigated with 1 L normal saline  The wound was then packed with 2 Betadine-soaked Kerlixes  It was dressed with ABDs and mesh underwear  Patient was taken back to the ICU      Patient Disposition:  Critical Care Unit    SIGNATURE: Sheree Duval MD  DATE: January 3, 2021  TIME: 12:51 PM

## 2021-01-04 NOTE — PLAN OF CARE
Problem: Potential for Falls  Goal: Patient will remain free of falls  Description: INTERVENTIONS:  - Assess patient frequently for physical needs  -  Identify cognitive and physical deficits and behaviors that affect risk of falls    -  [place] fall precautions as indicated by assessment   - Educate patient/family on patient safety including physical limitations  - Instruct patient to call for assistance with activity based on assessment  - Modify environment to reduce risk of injury  - Consider OT/PT consult to assist with strengthening/mobility  Outcome: Progressing     Problem: Prexisting or High Potential for Compromised Skin Integrity  Goal: Skin integrity is maintained or improved  Description: INTERVENTIONS:  - Identify patients at risk for skin breakdown  - Assess and monitor skin integrity  - Assess and monitor nutrition and hydration status  - Monitor labs   - Assess for incontinence   - Turn and reposition patient  - Assist with mobility/ambulation  - Relieve pressure over bony prominences  - Avoid friction and shearing  - Provide appropriate hygiene as needed including keeping skin clean and dry  - Evaluate need for skin moisturizer/barrier cream  - Collaborate with interdisciplinary team   - Patient/family teaching  - Consider wound care consult   Outcome: Progressing     Problem: PAIN - ADULT  Goal: Verbalizes/displays adequate comfort level or baseline comfort level  Description: Interventions:  - Encourage patient to monitor pain and request assistance  - Assess pain using appropriate pain scale  - Administer analgesics based on type and severity of pain and evaluate response  - Implement non-pharmacological measures as appropriate and evaluate response  - Consider cultural and social influences on pain and pain management  - Notify physician/advanced practitioner if interventions unsuccessful or patient reports new pain  Outcome: Progressing     Problem: INFECTION - ADULT  Goal: Absence or prevention of progression during hospitalization  Description: INTERVENTIONS:  - Assess and monitor for signs and symptoms of infection  - Monitor lab/diagnostic results  - Monitor all insertion sites, i e  indwelling lines, tubes, and drains  - Monitor endotracheal if appropriate and nasal secretions for changes in amount and color  - [place] appropriate cooling/warming therapies per order  - Administer medications as ordered  - Instruct and encourage patient and family to use good hand hygiene technique  - Identify and instruct in appropriate isolation precautions for identified infection/condition  Outcome: Progressing  Goal: Absence of fever/infection during neutropenic period  Description: INTERVENTIONS:  - Monitor WBC    Outcome: Progressing     Problem: SAFETY ADULT  Goal: Patient will remain free of falls  Description: INTERVENTIONS:  - Assess patient frequently for physical needs  -  Identify cognitive and physical deficits and behaviors that affect risk of falls    -  [place] fall precautions as indicated by assessment   - Educate patient/family on patient safety including physical limitations  - Instruct patient to call for assistance with activity based on assessment  - Modify environment to reduce risk of injury  - Consider OT/PT consult to assist with strengthening/mobility  Outcome: Progressing  Goal: Maintain or return to baseline ADL function  Description: INTERVENTIONS:  -  Assess patient's ability to carry out ADLs; assess patient's baseline for ADL function and identify physical deficits which impact ability to perform ADLs (bathing, care of mouth/teeth, toileting, grooming, dressing, etc )  - Assess/evaluate cause of self-care deficits   - Assess range of motion  - Assess patient's mobility; develop plan if impaired  - Assess patient's need for assistive devices and provide as appropriate  - Encourage maximum independence but intervene and supervise when necessary  - Involve family in performance of ADLs  - Assess for home care needs following discharge   - Consider OT consult to assist with ADL evaluation and planning for discharge  - Provide patient education as appropriate  Outcome: Progressing  Goal: Maintain or return mobility status to optimal level  Description: INTERVENTIONS:  - Assess patient's baseline mobility status (ambulation, transfers, stairs, etc )    - Identify cognitive and physical deficits and behaviors that affect mobility  - Identify mobility aids required to assist with transfers and/or ambulation (gait belt, sit-to-stand, lift, walker, cane, etc )  - [place] fall precautions as indicated by assessment  - Record patient progress and toleration of activity level on Mobility SBAR; progress patient to next Phase/Stage  - Instruct patient to call for assistance with activity based on assessment  - Consider rehabilitation consult to assist with strengthening/weightbearing, etc   Outcome: Progressing     Problem: DISCHARGE PLANNING  Goal: Discharge to home or other facility with appropriate resources  Description: INTERVENTIONS:  - Identify barriers to discharge w/patient and caregiver  - Arrange for needed discharge resources and transportation as appropriate  - Identify discharge learning needs (meds, wound care, etc )  - Arrange for interpretive services to assist at discharge as needed  - Refer to Case Management Department for coordinating discharge planning if the patient needs post-hospital services based on physician/advanced practitioner order or complex needs related to functional status, cognitive ability, or social support system  Outcome: Progressing     Problem: Knowledge Deficit  Goal: Patient/family/caregiver demonstrates understanding of disease process, treatment plan, medications, and discharge instructions  Description: Complete learning assessment and assess knowledge base    Interventions:  - Provide teaching at level of understanding  - Provide teaching via preferred learning methods  Outcome: Progressing     Problem: SAFETY,RESTRAINT: NV/NON-SELF DESTRUCTIVE BEHAVIOR  Goal: Remains free of harm/injury (restraint for non violent/non self-detsructive behavior)  Description: INTERVENTIONS:  - Instruct patient/family regarding restraint use   - Assess and monitor physiologic and psychological status   - Provide interventions and comfort measures to meet assessed patient needs   - Identify and implement measures to help patient regain control  - Assess readiness for release of restraint   Outcome: Progressing  Goal: Returns to optimal restraint-free functioning  Description: INTERVENTIONS:  - Assess the patient's behavior and symptoms that indicate continued need for restraint  - Identify and implement measures to help patient regain control  - Assess readiness for release of restraint   Outcome: Progressing     Problem: CARDIOVASCULAR - ADULT  Goal: Maintains optimal cardiac output and hemodynamic stability  Description: INTERVENTIONS:  - Monitor I/O, vital signs and rhythm  - Monitor for S/S and trends of decreased cardiac output  - Administer and titrate ordered vasoactive medications to optimize hemodynamic stability  - Assess quality of pulses, skin color and temperature  - Assess for signs of decreased coronary artery perfusion  - Instruct patient to report change in severity of symptoms  Outcome: Progressing  Goal: Absence of cardiac dysrhythmias or at baseline rhythm  Description: INTERVENTIONS:  - Continuous cardiac monitoring, vital signs, obtain 12 lead EKG if ordered  - Administer antiarrhythmic and heart rate control medications as ordered  - Monitor electrolytes and administer replacement therapy as ordered  Outcome: Progressing     Problem: RESPIRATORY - ADULT  Goal: Achieves optimal ventilation and oxygenation  Description: INTERVENTIONS:  - Assess for changes in respiratory status  - Assess for changes in mentation and behavior  - Position to facilitate oxygenation and minimize respiratory effort  - Oxygen administered by appropriate delivery if ordered  - Initiate smoking cessation education as indicated  - Encourage broncho-pulmonary hygiene including cough, deep breathe, Incentive Spirometry  - Assess the need for suctioning and aspirate as needed  - Assess and instruct to report SOB or any respiratory difficulty  - Respiratory Therapy support as indicated  Outcome: Progressing     Problem: GENITOURINARY - ADULT  Goal: Maintains or returns to baseline urinary function  Description: INTERVENTIONS:  - Assess urinary function  - Encourage oral fluids to ensure adequate hydration if ordered  - Administer IV fluids as ordered to ensure adequate hydration  - Administer ordered medications as needed  - Offer frequent toileting  - Follow urinary retention protocol if ordered  Outcome: Progressing  Goal: Absence of urinary retention  Description: INTERVENTIONS:  - Assess patients ability to void and empty bladder  - Monitor I/O  - Bladder scan as needed  - Discuss with physician/AP medications to alleviate retention as needed  - Discuss catheterization for long term situations as appropriate  Outcome: Progressing  Goal: Urinary catheter remains patent  Description: INTERVENTIONS:  - Assess patency of urinary catheter  - If patient has a chronic perkins, consider changing catheter if non-functioning  - Follow guidelines for intermittent irrigation of non-functioning urinary catheter  Outcome: Progressing     Problem: METABOLIC, FLUID AND ELECTROLYTES - ADULT  Goal: Electrolytes maintained within normal limits  Description: INTERVENTIONS:  - Monitor labs and assess patient for signs and symptoms of electrolyte imbalances  - Administer electrolyte replacement as ordered  - Monitor response to electrolyte replacements, including repeat lab results as appropriate  - Instruct patient on fluid and nutrition as appropriate  Outcome: Progressing  Goal: Fluid balance maintained  Description: INTERVENTIONS:  - Monitor labs   - Monitor I/O and WT  - Instruct patient on fluid and nutrition as appropriate  - Assess for signs & symptoms of volume excess or deficit  Outcome: Progressing  Goal: Glucose maintained within target range  Description: INTERVENTIONS:  - Monitor Blood Glucose as ordered  - Assess for signs and symptoms of hyperglycemia and hypoglycemia  - Administer ordered medications to maintain glucose within target range  - Assess nutritional intake and initiate nutrition service referral as needed  Outcome: Progressing     Problem: SKIN/TISSUE INTEGRITY - ADULT  Goal: Skin integrity remains intact  Description: INTERVENTIONS  - Identify patients at risk for skin breakdown  - Assess and monitor skin integrity  - Assess and monitor nutrition and hydration status  - Monitor labs (i e  albumin)  - Assess for incontinence   - Turn and reposition patient  - Assist with mobility/ambulation  - Relieve pressure over bony prominences  - Avoid friction and shearing  - Provide appropriate hygiene as needed including keeping skin clean and dry  - Evaluate need for skin moisturizer/barrier cream  - Collaborate with interdisciplinary team (i e  Nutrition, Rehabilitation, etc )   - Patient/family teaching  Outcome: Progressing  Goal: Incision(s), wounds(s) or drain site(s) healing without S/S of infection  Description: INTERVENTIONS  - Assess and document risk factors for skin impairment   - Assess and document dressing, incision, wound bed, drain sites and surrounding tissue  - Consider nutrition services referral as needed  - Oral mucous membranes remain intact  - Provide patient/ family education  Outcome: Progressing  Goal: Oral mucous membranes remain intact  Description: INTERVENTIONS  - Assess oral mucosa and hygiene practices  - Implement preventative oral hygiene regimen  - Implement oral medicated treatments as ordered  - Initiate Nutrition services referral as needed  Outcome: Progressing

## 2021-01-04 NOTE — ACP (ADVANCE CARE PLANNING)
I had a discussion with Leslie Correa concerned her grave prognosis and worsening clinical picture  Leslie Correa had been escalating on pressors late last night  At approximately 11:45 am I examined Leslie Correa and she had extreme abdominal pain out of proportion to exam  I immediately called surgery and ordered a KUB  This most likely represents bowel ischemia  I discussed with Leslie Correa the need to go to the operating room, but that this would be a difficult operation and that she would likely return to the ICU with an open abdomen  She adamantly refused the operation  I asked her if she would like us to continue current management without surgery and she also refused  I discussed comfort care and she elected to be comfort care  I explained to her that she would die and she understood  She would like to receive last rights  At this time we will increase pain medication but will continue medical management until she receives last rights

## 2021-01-04 NOTE — DISCHARGE SUMMARY
Critical Care Discharge Death Summary   [place] 77 y o  female MRN: [id number]  [address] [facility]   Unit/Bed#: [place] 276-01 Encounter: [id number]    [address] [place] [address]    Admitting Provider: Govind Britt DO  Discharge Provider: Govind Britt DO    Admission Date: 1/1/2021       Date of Death: 1/4/2021    Primary Discharge Diagnosis  Principal Problem:    Septic shock ([facility] )  Active Problems:    Cellulitis of buttock    Acute kidney injury ([facility] )    Acute respiratory failure with hypoxia ([facility] )    Lactic acidosis    Hyperbilirubinemia    Anemia    Metabolic acidosis    Severe mitral regurgitation    Diarrhea    Persistent atrial fibrillation (HCC)    Chronic systolic congestive heart failure ([facility] )    CKD (chronic kidney disease) stage 3, GFR 30-59 ml/min  Resolved Problems:    Hyponatremia      Consulting Providers   · General Surgery, Vascular Surgery, Nephro, ID     Therapeutic/Diagnostic Operative Procedures Performed  · 12/31: Aortic central catheter (inadvertant placement)   · 12/31: Right femoral TLC   · 12/31 CT chest: Malposition of right neck catheter with arterial extension, its tip terminating in the ascending aorta  · 12/31 CT pelvis: Subcutaneous edema of R gluteal region  No focal fluid collection or abscess  No soft tissue gas  · 12/31 CT chest: Reticular opacities throughout lung parenchyma, with basilar predominance  Probably residual scarring from patient's recent COVID-19 pneumonia  No airspace consolidation currently   Moderate emphysema      · 1/1 ETT  · 1/1 Echo: EF 20-25%, severe diffuse hypokinesis, akinesis of entire inferior wall, severe MR, mild to mod TR  · 1/2 I&D right buttock and labia  · 1/3 wash out right buttock, LIJ HD catheter       Hospital Course  Per [facility] 1/1 0141: "77 y o  female with systolic CHF, mitral regurgitation, breast cancer s/p right nephrectomy, CKD-3, and persistent a fib was recently discharged to a short term rehab facility following an admission from 20 to 20 for COVID-19 pneumonia  She progressed well at rehab, and was discharged home with a health aid on 20  She states that on 20, she started having diarrhea, and was having 3-4 bowel movements per day  She then noticed the following day that she had pain on her right buttock  She was told by her home health nurse that it was possibly a pressure point, and the skin was unremarkable  She continued to have diarrhea and worsening right buttock pain that she eventually presented to the ED with at [address] on 20  She was found to be in septic shock, and vasopressors were initiated  A central line was placed, which unfortunately terminated in her aorta vs her SVC  She was transferred to [facility] for treatment of worsening sepsis as well as evaluation by vascular surgery "     Patient intubated for airway protection after ICU transfer   Patient taken for I&D of right buttock with surgery  and washout on 1/3  Patient with RICHI, likely ATN in setting of septic shock, without response to diuretic therapy and worsening acidosis and started on CRRT 1/3  Patient with worsening hypotension requiring escalation of vasopressor overnight into this morning with persistent hypotension  Patient acutely c/o abdominal pain, surgery asked to evaluate  High suspicion for ischemic bowel  Patient declined surgical exploration and expressed that she would just like to be made comfortable  Friends Cleopatra Farias and Loy Diana called in to bedside and patient received last rights from   She was given fentanyl and ativan and made comfort care   Patient  peacefully surrounded by support team      Date, Time and Cause of Death    Date of Death: 21  Time of Death:  3:10 PM  Preliminary Cause of Death: Septic shock ([facility] )  Entered by: Luis E LAWSON[JS1 1]     Attribution     JS1 1 RENNY Dickerson 21 15:23          Code Status: Level 4 - Comfort Care    Discharge Condition:     Signature: RENNY Roth  Date: 2021

## 2021-01-04 NOTE — RESPIRATORY THERAPY NOTE
RT Ventilator Management Note  Jasper Keane 77 y o  female MRN: [id number]  Unit/Bed#: [facility] 513-01 Encounter: [id number]      Daily Screen       1/3/2021  0754 1/4/2021  0794          Patient safety screen outcome[de-identified]  --  Passed      Not Ready for Weaning due to[de-identified]  --  Hemodynamically unstable      Spont breathing trial outcome[de-identified]  Failed  Failed      Spont breathing trial reason failed:  RSBI > 100  RR > 35 bpm;RSBI > 100      Previous settings resumed:  Yes  Yes      Name of Medical Team Notified[de-identified]  --  --              Physical Exam:   Assessment Type: Assess only  General Appearance: Awake  Respiratory Pattern: Assisted  Chest Assessment: Chest expansion symmetrical  Bilateral Breath Sounds: Diminished  Cough: Non-productive  Suction: ET Tube  O2 Device: ra      Resp Comments: extubated pt as ordered for comfort care

## 2021-01-04 NOTE — PROGRESS NOTES
Progress Note - General Surgery   Rich Whitley 77 y o  female MRN: [id number]  Unit/Bed#: [facility] 513-01 Encounter: [id number]    Assessment:  78 yo female w/ afib on eliquis, HTN, CAD, cardiomyopathy, CHF, who p/w diarrhea, RICHI, right gluteal cellulitis, and arterially placed triple lumen catheter with acute respiratory failure and shock  Patient is s/p washout and debridements on 1/2 and 1/3/2021    WBC- 38 from 38  LA 3 4 from 2 5  ABG 7 38/34/66/20 Base deficit -4 7    Pressors: 28 Levo, 5 epi, 0 04 of vaso    Vent AC/VC TV-450 PEEP-8 FIO2-40%    Plan:  -OR yesterday demonstraited small amount of fat necrosis and superficial necrotic tissue  This is unlikely the cause of patient's shock  Most likely cardiogenic in origin  -Will perform bedside dressing change today with betadine soaked kerlix , potential placement of wound vac tomorrow   -Patient with diffuse abdominal pain, no recorded bloody bowel movements will consider duplex to rule out DELANEY  -Monitor abd exam  -Wean vent per ICU  -Wean pressors per ICU  -Stated on CRRT last evening running even  -Remainder of care per ICU    Subjective/Objective   Chief Complaint:     Subjective: Follow commands  Patient was started on CRRT overnight, was initially doing better levo came down to 22; however, became more hypotensive requiring an increase in pressors  Alert in bed    Objective:     Blood pressure (!) 86/49, pulse (!) 110, temperature (!) 96 8 °F (36 °C), temperature source Esophageal, resp  rate 20, weight 95 6 kg (210 lb 12 2 oz), SpO2 96 %  ,Body mass index is 30 24 kg/m²        Intake/Output Summary (Last 24 hours) at 1/4/2021 0133  Last data filed at 1/3/2021 2200  Gross per 24 hour   Intake 3942 83 ml   Output 575 ml   Net 3367 83 ml       Invasive Devices     Central Venous Catheter Line            CVC Central Lines 12/31/20 Triple 20cm 3 days          Peripheral Intravenous Line            Peripheral IV 12/31/20 Left Leg 3 days          Arterial Line            Arterial Line 01/01/21 Radial 2 days          Hemodialysis Catheter            HD Temporary Double Catheter less than 1 day          Drain            NG/OG/Enteral Tube Orogastric 18 Fr Center mouth 2 days    Urethral Catheter Non-latex 16 Fr  2 days          Airway            ETT  Cuffed; Hi-Lo 8 mm 2 days                Physical Exam:   General: Intubated Critical ill  HENT: MMM, ET tube in place  Neck: supple, no JVD  CV: RRR  Lungs: mechanical breath sounds  ABD: Soft, diffusely tender worse in LUQ and RLQ, rebound tenderness in LUQ  Non distended  Extrem: No CCE  : Nguyen in place  Neuro: alert, nodes appropriately to questions    Wound dressings in place will take down later in the day      DVT ppx: hep gtt, SCDS

## 2021-01-04 NOTE — RESPIRATORY THERAPY NOTE
RT Ventilator Management Note  Jarrett Escobar 77 y o  female MRN: [id number]  Unit/Bed#: [facility] 431-31 Encounter: [id number]      Daily Screen       1/3/2021  0718 1/3/2021  0754          Patient safety screen outcome[de-identified]  Passed  --      Spont breathing trial % for 30 min:  --  --      Spont breathing trial outcome[de-identified]  Passed  Failed      Spont breathing trial reason failed:  --  RSBI > 100      Previous settings resumed:  --  Yes      Name of Medical Team Notified[de-identified]  --  --              Physical Exam:   Assessment Type: Assess only  General Appearance: Awake  Respiratory Pattern: Assisted  Chest Assessment: Chest expansion symmetrical  Bilateral Breath Sounds: Diminished  Suction: ET Tube      Resp Comments: (P) Pt continued on previous ACVC+ vent settings at this time  Continue to suction small amount of bloody secretions from ETT  No vent changes made overnight  Will continue to monitor and assess patient per respiratory protocol

## 2021-01-04 NOTE — PLAN OF CARE
Problem: Potential for Falls  Goal: Patient will remain free of falls  Description: INTERVENTIONS:  - Assess patient frequently for physical needs  -  Identify cognitive and physical deficits and behaviors that affect risk of falls    -  [place] fall precautions as indicated by assessment   - Educate patient/family on patient safety including physical limitations  - Instruct patient to call for assistance with activity based on assessment  - Modify environment to reduce risk of injury  - Consider OT/PT consult to assist with strengthening/mobility  Outcome: Completed     Problem: Prexisting or High Potential for Compromised Skin Integrity  Goal: Skin integrity is maintained or improved  Description: INTERVENTIONS:  - Identify patients at risk for skin breakdown  - Assess and monitor skin integrity  - Assess and monitor nutrition and hydration status  - Monitor labs   - Assess for incontinence   - Turn and reposition patient  - Assist with mobility/ambulation  - Relieve pressure over bony prominences  - Avoid friction and shearing  - Provide appropriate hygiene as needed including keeping skin clean and dry  - Evaluate need for skin moisturizer/barrier cream  - Collaborate with interdisciplinary team   - Patient/family teaching  - Consider wound care consult   Outcome: Completed     Problem: PAIN - ADULT  Goal: Verbalizes/displays adequate comfort level or baseline comfort level  Description: Interventions:  - Encourage patient to monitor pain and request assistance  - Assess pain using appropriate pain scale  - Administer analgesics based on type and severity of pain and evaluate response  - Implement non-pharmacological measures as appropriate and evaluate response  - Consider cultural and social influences on pain and pain management  - Notify physician/advanced practitioner if interventions unsuccessful or patient reports new pain  Outcome: Completed     Problem: INFECTION - ADULT  Goal: Absence or prevention of progression during hospitalization  Description: INTERVENTIONS:  - Assess and monitor for signs and symptoms of infection  - Monitor lab/diagnostic results  - Monitor all insertion sites, i e  indwelling lines, tubes, and drains  - Monitor endotracheal if appropriate and nasal secretions for changes in amount and color  - [place] appropriate cooling/warming therapies per order  - Administer medications as ordered  - Instruct and encourage patient and family to use good hand hygiene technique  - Identify and instruct in appropriate isolation precautions for identified infection/condition  Outcome: Completed  Goal: Absence of fever/infection during neutropenic period  Description: INTERVENTIONS:  - Monitor WBC    Outcome: Completed     Problem: SAFETY ADULT  Goal: Patient will remain free of falls  Description: INTERVENTIONS:  - Assess patient frequently for physical needs  -  Identify cognitive and physical deficits and behaviors that affect risk of falls    -  [place] fall precautions as indicated by assessment   - Educate patient/family on patient safety including physical limitations  - Instruct patient to call for assistance with activity based on assessment  - Modify environment to reduce risk of injury  - Consider OT/PT consult to assist with strengthening/mobility  Outcome: Completed  Goal: Maintain or return to baseline ADL function  Description: INTERVENTIONS:  -  Assess patient's ability to carry out ADLs; assess patient's baseline for ADL function and identify physical deficits which impact ability to perform ADLs (bathing, care of mouth/teeth, toileting, grooming, dressing, etc )  - Assess/evaluate cause of self-care deficits   - Assess range of motion  - Assess patient's mobility; develop plan if impaired  - Assess patient's need for assistive devices and provide as appropriate  - Encourage maximum independence but intervene and supervise when necessary  - Involve family in performance of ADLs  - Assess for home care needs following discharge   - Consider OT consult to assist with ADL evaluation and planning for discharge  - Provide patient education as appropriate  Outcome: Completed  Goal: Maintain or return mobility status to optimal level  Description: INTERVENTIONS:  - Assess patient's baseline mobility status (ambulation, transfers, stairs, etc )    - Identify cognitive and physical deficits and behaviors that affect mobility  - Identify mobility aids required to assist with transfers and/or ambulation (gait belt, sit-to-stand, lift, walker, cane, etc )  - [place] fall precautions as indicated by assessment  - Record patient progress and toleration of activity level on Mobility SBAR; progress patient to next Phase/Stage  - Instruct patient to call for assistance with activity based on assessment  - Consider rehabilitation consult to assist with strengthening/weightbearing, etc   Outcome: Completed     Problem: DISCHARGE PLANNING  Goal: Discharge to home or other facility with appropriate resources  Description: INTERVENTIONS:  - Identify barriers to discharge w/patient and caregiver  - Arrange for needed discharge resources and transportation as appropriate  - Identify discharge learning needs (meds, wound care, etc )  - Arrange for interpretive services to assist at discharge as needed  - Refer to Case Management Department for coordinating discharge planning if the patient needs post-hospital services based on physician/advanced practitioner order or complex needs related to functional status, cognitive ability, or social support system  Outcome: Completed     Problem: Knowledge Deficit  Goal: Patient/family/caregiver demonstrates understanding of disease process, treatment plan, medications, and discharge instructions  Description: Complete learning assessment and assess knowledge base    Interventions:  - Provide teaching at level of understanding  - Provide teaching via preferred learning methods  Outcome: Completed     Problem: SAFETY,RESTRAINT: NV/NON-SELF DESTRUCTIVE BEHAVIOR  Goal: Remains free of harm/injury (restraint for non violent/non self-detsructive behavior)  Description: INTERVENTIONS:  - Instruct patient/family regarding restraint use   - Assess and monitor physiologic and psychological status   - Provide interventions and comfort measures to meet assessed patient needs   - Identify and implement measures to help patient regain control  - Assess readiness for release of restraint   Outcome: Completed  Goal: Returns to optimal restraint-free functioning  Description: INTERVENTIONS:  - Assess the patient's behavior and symptoms that indicate continued need for restraint  - Identify and implement measures to help patient regain control  - Assess readiness for release of restraint   Outcome: Completed     Problem: CARDIOVASCULAR - ADULT  Goal: Maintains optimal cardiac output and hemodynamic stability  Description: INTERVENTIONS:  - Monitor I/O, vital signs and rhythm  - Monitor for S/S and trends of decreased cardiac output  - Administer and titrate ordered vasoactive medications to optimize hemodynamic stability  - Assess quality of pulses, skin color and temperature  - Assess for signs of decreased coronary artery perfusion  - Instruct patient to report change in severity of symptoms  Outcome: Completed  Goal: Absence of cardiac dysrhythmias or at baseline rhythm  Description: INTERVENTIONS:  - Continuous cardiac monitoring, vital signs, obtain 12 lead EKG if ordered  - Administer antiarrhythmic and heart rate control medications as ordered  - Monitor electrolytes and administer replacement therapy as ordered  Outcome: Completed     Problem: RESPIRATORY - ADULT  Goal: Achieves optimal ventilation and oxygenation  Description: INTERVENTIONS:  - Assess for changes in respiratory status  - Assess for changes in mentation and behavior  - Position to facilitate oxygenation and minimize respiratory effort  - Oxygen administered by appropriate delivery if ordered  - Initiate smoking cessation education as indicated  - Encourage broncho-pulmonary hygiene including cough, deep breathe, Incentive Spirometry  - Assess the need for suctioning and aspirate as needed  - Assess and instruct to report SOB or any respiratory difficulty  - Respiratory Therapy support as indicated  Outcome: Completed     Problem: GENITOURINARY - ADULT  Goal: Maintains or returns to baseline urinary function  Description: INTERVENTIONS:  - Assess urinary function  - Encourage oral fluids to ensure adequate hydration if ordered  - Administer IV fluids as ordered to ensure adequate hydration  - Administer ordered medications as needed  - Offer frequent toileting  - Follow urinary retention protocol if ordered  Outcome: Completed  Goal: Absence of urinary retention  Description: INTERVENTIONS:  - Assess patients ability to void and empty bladder  - Monitor I/O  - Bladder scan as needed  - Discuss with physician/AP medications to alleviate retention as needed  - Discuss catheterization for long term situations as appropriate  Outcome: Completed  Goal: Urinary catheter remains patent  Description: INTERVENTIONS:  - Assess patency of urinary catheter  - If patient has a chronic perkins, consider changing catheter if non-functioning  - Follow guidelines for intermittent irrigation of non-functioning urinary catheter  Outcome: Completed     Problem: METABOLIC, FLUID AND ELECTROLYTES - ADULT  Goal: Electrolytes maintained within normal limits  Description: INTERVENTIONS:  - Monitor labs and assess patient for signs and symptoms of electrolyte imbalances  - Administer electrolyte replacement as ordered  - Monitor response to electrolyte replacements, including repeat lab results as appropriate  - Instruct patient on fluid and nutrition as appropriate  Outcome: Completed  Goal: Fluid balance maintained  Description: INTERVENTIONS:  - Monitor labs   - Monitor I/O and WT  - Instruct patient on fluid and nutrition as appropriate  - Assess for signs & symptoms of volume excess or deficit  Outcome: Completed  Goal: Glucose maintained within target range  Description: INTERVENTIONS:  - Monitor Blood Glucose as ordered  - Assess for signs and symptoms of hyperglycemia and hypoglycemia  - Administer ordered medications to maintain glucose within target range  - Assess nutritional intake and initiate nutrition service referral as needed  Outcome: Completed     Problem: SKIN/TISSUE INTEGRITY - ADULT  Goal: Skin integrity remains intact  Description: INTERVENTIONS  - Identify patients at risk for skin breakdown  - Assess and monitor skin integrity  - Assess and monitor nutrition and hydration status  - Monitor labs (i e  albumin)  - Assess for incontinence   - Turn and reposition patient  - Assist with mobility/ambulation  - Relieve pressure over bony prominences  - Avoid friction and shearing  - Provide appropriate hygiene as needed including keeping skin clean and dry  - Evaluate need for skin moisturizer/barrier cream  - Collaborate with interdisciplinary team (i e  Nutrition, Rehabilitation, etc )   - Patient/family teaching  Outcome: Completed  Goal: Incision(s), wounds(s) or drain site(s) healing without S/S of infection  Description: INTERVENTIONS  - Assess and document risk factors for skin impairment   - Assess and document dressing, incision, wound bed, drain sites and surrounding tissue  - Consider nutrition services referral as needed  - Oral mucous membranes remain intact  - Provide patient/ family education  Outcome: Completed  Goal: Oral mucous membranes remain intact  Description: INTERVENTIONS  - Assess oral mucosa and hygiene practices  - Implement preventative oral hygiene regimen  - Implement oral medicated treatments as ordered  - Initiate Nutrition services referral as needed  Outcome: Completed

## 2021-01-04 NOTE — NURSING NOTE
Pt requesting  to administer last rights before withdrawing care  Dr Leong Spotted contacted emergency contact Eunice Williamson  Who will be in in approximately 1 hour to do last rights  In house pastoral care also notified to see if  is immediately available to give last rights per pt request   Will continue to monitor

## 2021-01-04 NOTE — RESPIRATORY THERAPY NOTE
RT Ventilator Management Note  Sherwin Conley 77 y o  female MRN: [id number]  Unit/Bed#: [facility] 184-52 Encounter: [id number]      Daily Screen       1/3/2021  0754 1/4/2021  0747          Patient safety screen outcome[de-identified]  --  Passed      Not Ready for Weaning due to[de-identified]  --  Hemodynamically unstable      Spont breathing trial outcome[de-identified]  Failed  Failed      Spont breathing trial reason failed:  RSBI > 100  RR > 35 bpm;RSBI > 100      Previous settings resumed:  Yes  Yes      Name of Medical Team Notified[de-identified]  --  --              Physical Exam:   Assessment Type: Assess only  General Appearance: Awake  Respiratory Pattern: Assisted  Chest Assessment: Chest expansion symmetrical  Bilateral Breath Sounds: Diminished  Cough: Non-productive  Suction: ET Tube  O2 Device: (P) vent      Resp Comments: (P) pt transistioning to Comfort Care  Level 4 and I placed her back on AC/VC+ as ordered

## 2021-01-04 NOTE — PROGRESS NOTES
Pastoral Care Progress Note    2021  Patient: Donnie Manning : 1945  Admission Date & Time: 2021 0105  MRN: [id number] CSN: [id number]                     Chaplaincy Interventions Utilized:   Empowerment: Provided anxiety containment and Provided chaplaincy education    Exploration: Explored emotional needs & resources and Explored spiritual needs & resources    Collaboration: Consulted with interdisciplinary team and Facilitated respect for spiritual/cultural practice during hospitalization    Relationship Building: Cultivated a relationship of care and support and Provided silent and supportive presence    Ritual: Provided prayer    Chaplaincy Outcomes Achieved: Identified meaningful connections and Improved communication    Spiritual Coping Strategies Utilized:   Spiritual comfort       21 1400   Clinical Encounter Type   Visited With Patient; Family; Health care provider   Routine Visit Introduction   Crisis Visit Critical Care   Referral From Nurse   Mormonism Encounters   Mormonism Needs Prayer

## 2021-01-04 NOTE — UTILIZATION REVIEW
Notification of Inpatient Admission/Inpatient Authorization Request   This is a Notification of Inpatient Admission for [address]  Be advised that this patient was admitted to our facility under Inpatient Status  Contact Kenneth Desai at [phone] for additional admission information  [facility] DEPT  DEDICATED [phone]  Patient Name:   Jayshree Chávez   YOB: 1945       [address]   [address]:   [address]  Tax ID: [id number]  NPI: [id number] Attending Provider/NPI:  Phone:  Address: José Key Do [[id number]]  [phone]  Same as JOEL/Alexanrd Gonsales 1106 of Service Code: 24 Place of Service Name:  [address] [place] [address]   Start Date: 12/31/20 1437 Discharge Date & Time: 1/1/2021 12:34 AM    Type of Admission: Inpatient Status Discharge Disposition   (if discharged): [facility]   Patient Diagnoses: Dyspnea [R06 00]  Anemia [D64 9]  Buttock pain [M79 18]  Acute renal failure (ARF) ([facility] [address] ) [N17 9]  Cellulitis of right buttock [G49 753]     Orders: Admission Orders (From admission, onward)     Ordered        12/31/20 1437  Inpatient Admission  Once                    Assigned Utilization Review Contact: Kenneth Desai  Utilization   Network Utilization Review Department  Phone: [phone]; Fax [phone]  Email: [email or web address]   ATTENTION PAYERS: Please call the assigned Utilization  directly with any questions or concerns ALL voicemails in the department are confidential  Send all requests for admission clinical reviews, approved or denied determinations and any other requests to dedicated fax number belonging to the campus where the patient is receiving treatment

## 2021-01-04 NOTE — PROGRESS NOTES
Progress Note - Vascular Surgery   Priya Precise 77 y o  female MRN: [id number]  Unit/Bed#: [facility] 513-01 Encounter: [id number]    Assessment:  76 yo female w/ afib on eliquis, HTN, CAD, cardiomyopathy, CHF, who p/w diarrhea, RICHI, right gluteal cellulitis, and arterially placed triple lumen catheter with acute respiratory failure and shock  Patient is s/p washout and debridements on 1/2 and 1/3/2021      Plan:  -Patient remains critical ill, vascular surgery plan is to characterize location of central line with CTA once stabilized  No evidence of stroke  -Continue anticoagulation and continue ICU management  -Remainder of care per ICU    Subjective/Objective   Chief Complaint:     Subjective: Follow commands, no acute events overnight  Patient non-responsive to bumex and  Lasix so started on CRRT yesterday  Objective:     Blood pressure (!) 86/49, pulse (!) 112, temperature (!) 97 2 °F (36 2 °C), temperature source Esophageal, resp  rate 19, weight 95 6 kg (210 lb 12 2 oz), SpO2 95 %  ,Body mass index is 30 24 kg/m²  Intake/Output Summary (Last 24 hours) at 1/4/2021 0144  Last data filed at 1/3/2021 2200  Gross per 24 hour   Intake 3942 83 ml   Output 575 ml   Net 3367 83 ml       Invasive Devices     Central Venous Catheter Line            CVC Central Lines 12/31/20 Triple 20cm 3 days          Peripheral Intravenous Line            Peripheral IV 12/31/20 Left Leg 3 days          Arterial Line            Arterial Line 01/01/21 Radial 2 days          Hemodialysis Catheter            HD Temporary Double Catheter less than 1 day          Drain            NG/OG/Enteral Tube Orogastric 18 Fr Center mouth 2 days    Urethral Catheter Non-latex 16 Fr  2 days          Airway            ETT  Cuffed; Hi-Lo 8 mm 2 days                Physical Exam:   General: Intubated Critically ill  HENT: MMM, ET tube in place  Neck: supple, no JVD  CV: RRR  Lungs: mechanical breath sounds  ABD: Soft, diffuse tender, nondistended  Extrem: No CCE  Neuro: AAOx3    Would dressing c/d/i will take down later today for dressing change    DVT ppx: hep gtt, SCDS

## 2021-01-04 NOTE — NURSING NOTE
Family/friends and  at bedside    Per pt and friends  home once pt passes will be [facility] in [place], [place]

## 2021-01-04 NOTE — PROGRESS NOTES
NEPHROLOGY PROGRESS NOTE   Kamille Almodovar 77 y o  female MRN: [id number]  Unit/Bed#: [facility] 761-52 Encounter: [id number]  Reason for Consult: RICHI    ASSESSMENT AND PLAN:  RICHI POA, baseline creatinine 0 9 to 1 1  -RICHI suspect in the setting of severe hypotension, ischemic ATN, septic ATN  -urine output remains poor now remains on multiple vasopressors  -patient was started on CRRT last night on 1/3/21  -patient was seen and examined on CRRT at 11:45 a m  Isaura Sequin Tolerating dialysis well so far  FF 16%  -UF removal 0 to 50 mL/hour as tolerated  -UA shows 1+ protein, 4 to 10 WBCs, innumerable RBCs  -complements lower with C3 level 36, C4 level 8 (lower complements in the setting of elevated LFTs)  -renal ultrasound shows relatively normal size kidneys, normal echogenicity, no hydronephrosis  Multiple simple cyst on both kidneys  Hypophosphatemia, serum phosphorus 2 2, continue CRRT electrolyte replacement protocol  Lactic acidosis, most recent lactic acid level slightly improved to 0 7  Continue to trend on dialysis    Anemia in critical illness, continue to monitor hemoglobin, transfuse p r n  For hemoglobin less than seven    Volume overload, CHF  -echo shows EF 20 to 25%, severe MR, mild-to-moderate TR, normal IVC  -UF removal as BP tolerated on dialysis   -goal to keep negative balance    VDRF, currently on 40% FiO2  Shock, septic shock/cardiogenic shock  -currently on multiple vasopressors  -no pericardial effusion on echo  -antibiotic as per ICU  -blood culture negative so far    Right buttock cellulitis, status post wound debridement, washout    Discussed above plan in detail with ICU team    SUBJECTIVE:  Patient seen and examined at bedside  She remains critically ill remains on multiple vasopressors  Patient was started on CRRT last night  Urine output remains poor    OBJECTIVE:  Current Weight: Weight - Scale: 96 7 kg (213 lb 3 oz)  Vitals:    01/04/21 1100   BP:    Pulse: (!) 112   Resp: 13   Temp: SpO2: 96%       Intake/Output Summary (Last 24 hours) at 1/4/2021 1236  Last data filed at 1/4/2021 1100  Gross per 24 hour   Intake 4718 85 ml   Output 3227 ml   Net 1491 85 ml     Wt Readings from Last 3 Encounters:   01/04/21 96 7 kg (213 lb 3 oz)   12/31/20 79 8 kg (176 lb)   12/31/20 79 8 kg (175 lb 14 8 oz)     Temp Readings from Last 3 Encounters:   01/04/21 98 6 °F (37 °C) (Axillary)   12/31/20 97 5 °F (36 4 °C) (Oral)   11/25/20 (!) 97 °F (36 1 °C) (Tympanic)     BP Readings from Last 3 Encounters:   01/04/21 91/53   01/01/21 (!) 75/48   11/25/20 102/60     Pulse Readings from Last 3 Encounters:   01/04/21 (!) 112   01/03/21 (!) 117   01/02/21 (!) 132        Physical Examination:  General:  Lying in bed, intubated   Eyes:  Mild conjunctival pallor present  ENT:  ET tube present, external examination of ears and nose unremarkable  Neck:  No obvious lymphadenopathy appreciated  Respiratory:  Bilateral air entry present  CVS:  S1, S2 present  GI:  Soft, nondistended, tenderness present  CNS:  Intubated, follows commands  Skin:  No new rash in legs  Musculoskeletal:  No obvious gross deformity noted    Medications:    Current Facility-Administered Medications:     acetaminophen (TYLENOL) tablet 975 mg, 975 mg, Oral, Q8H PRN, Nicola Bailey MD    amiodarone (CORDARONE) 900 mg in dextrose 5 % 500 mL infusion, 1 mg/min, Intravenous, Continuous, Nicola Bailey MD, Last Rate: 33 3 mL/hr at 01/04/21 1048, 1 mg/min at 01/04/21 1048    cefepime (MAXIPIME) 1,000 mg in dextrose 5 % 50 mL IVPB, 1,000 mg, Intravenous, Q12H, Nicola Bailey MD, Last Rate: 100 mL/hr at 01/04/21 0301, 1,000 mg at 01/04/21 0301    chlorhexidine (PERIDEX) 0 12 % oral rinse 15 mL, 15 mL, [employer], Q12H [facility], Nicola Bailey MD, 15 mL at 01/04/21 0936    EPINEPHrine 6000 mcg (DOUBLE CONCENTRATION) IV in sodium chloride 0 9% 250 mL, 5 mcg/min, Intravenous, Continuous, Tulio Salmon PA-C, Last Rate: 12 5 mL/hr at 01/04/21 0503, 5 mcg/min at 01/04/21 0503    fentaNYL 1000 mcg in sodium chloride 0 9% 100mL infusion, 75 mcg/hr, Intravenous, Continuous, Alan Jasso MD, Last Rate: 7 5 mL/hr at 01/04/21 0519, 75 mcg/hr at 01/04/21 0519    fentanyl citrate (PF) 100 MCG/2ML 50 mcg, 50 mcg, Intravenous, Q1H PRN, Alan Jasso MD, 50 mcg at 01/04/21 1043    heparin (porcine) 25,000 units in 0 45% NaCl 250 mL infusion (premix), 3-20 Units/kg/hr (Order-Specific), Intravenous, Titrated, Alan Jasso MD, Last Rate: 16 5 mL/hr at 01/04/21 0532, 22 Units/kg/hr at 01/04/21 0532    heparin (porcine) injection 2,000 Units, 2,000 Units, Intravenous, Q1H PRN, Alan Jasso MD, 2,000 Units at 01/03/21 1837    heparin (porcine) injection 4,000 Units, 4,000 Units, Intravenous, Q1H PRN, Alan Jasso MD, 4,000 Units at 01/01/21 1146    hydrocortisone sodium succinate (PF) (Solu-CORTEF) injection 50 mg, 50 mg, Intravenous, Q6H [facility], Alan Jasso MD, 50 mg at 01/04/21 0844    insulin lispro (HumaLOG) 100 units/mL subcutaneous injection 2-12 Units, 2-12 Units, Subcutaneous, Q6H [facility] **AND** Fingerstick Glucose (POCT), , , Q6H, RENNY Willis    melatonin tablet 6 mg, 6 mg, Oral, HS, Alan Jasso MD, 6 mg at 01/02/21 2147    metolazone (ZAROXOLYN) tablet 10 mg, 10 mg, Oral, Daily, Alan Jasso MD, 10 mg at 01/04/21 0934    metroNIDAZOLE (FLAGYL) IVPB (premix) 500 mg 100 mL, 500 mg, Intravenous, Q8H, Alan Jasso MD, Last Rate: 200 mL/hr at 01/04/21 0926, 500 mg at 01/04/21 0926    milrinone (PRIMACOR) 20 mg in 100 mL infusion (premix), 0 13 mcg/kg/min, Intravenous, Continuous, Nargis Bravo V, CRNP, Last Rate: 3 8 mL/hr at 01/04/21 1030, 0 13 mcg/kg/min at 01/04/21 1030    norepinephrine (LEVOPHED) 8 mg (DOUBLE CONCENTRATION) IV in sodium chloride 0 9% 250 mL, 1-30 mcg/min, Intravenous, Titrated, Alan Jasso MD, Last Rate: 52 5 mL/hr at 01/04/21 1140, 28 mcg/min at 01/04/21 1140    NxStage K 4/Ca 3 dialysis solution (RFP-401) 20,000 mL, 20,000 mL, Dialysis, Continuous, Omkar Awan DO, 20,000 mL at 01/04/21 0530    OLANZapine (ZyPREXA ZYDIS) dispersible tablet 10 mg, 10 mg, Oral, HS, Nargis Mannnello V, CRNP    pantoprazole (PROTONIX) injection 40 mg, 40 mg, Intravenous, Q12H [address], Murali Abraham MD, 40 mg at 01/04/21 0844    polyethylene glycol (MIRALAX) packet 17 g, 17 g, Oral, Daily PRN, Murali Abraham MD    senna-docusate sodium (SENOKOT S) 8 6-50 mg per tablet 1 tablet, 1 tablet, Oral, HS, Murali Abraham MD    sodium chloride 0 9 % infusion, 5 mL/hr, Intravenous, Continuous, Murali Abraham MD, Last Rate: 5 mL/hr at 01/01/21 0320, 5 mL/hr at 01/01/21 0320    sodium phosphate 6 mmol in sodium chloride 0 9 % 100 mL Infusion, 6 mmol, Intravenous, Once, Omkar Awan DO, Last Rate: 50 mL/hr at 01/04/21 1112, 6 mmol at 01/04/21 1112    vancomycin (VANCOCIN) 1,250 mg in sodium chloride 0 9 % 250 mL IVPB, 15 mg/kg, Intravenous, Q24H, Murali Abraham MD, Stopped at 01/03/21 1502    vasopressin (PITRESSIN) 20 Units in sodium chloride 0 9 % 100 mL infusion, 0 06 Units/min, Intravenous, Continuous, ANTELMO WillisNP, Last Rate: 18 mL/hr at 01/04/21 1055, 0 06 Units/min at 01/04/21 1055    Laboratory Results:  Results from last 7 days   Lab Units 01/04/21  0957 01/04/21  0507 01/04/21  0416 01/03/21  2215 01/03/21  1830 01/03/21  1750 01/03/21  1603 01/03/21  0916 01/03/21  0430 01/03/21  0410  01/02/21  1720 01/02/21  1001 01/02/21  0338  01/02/21  0027  01/01/21  0205  12/31/20  1207   WBC Thousand/uL  --  38 05*  --   --  38 67*  --   --   --  35 56*  --   --   --  34 67* 30 07*  --   --   --  23 68*  --  21 23*   HEMOGLOBIN g/dL 8 4* 8 5* 8 6* 8 5* 8 4*  --  8 8* 8 8* 8 8*  --   --   --  10 0* 10 1*  --   --    < > 10 5*  --  11 0*   I STAT HEMOGLOBIN g/dl  --   --   --   --   --   --   --   --   --   --   --   --   --   --   --  9 9*  --   --   --   --    HEMATOCRIT %  --  25 8*  -- --  26 2*  --   --   --  26 7*  --   --   --  30 1* 30 5*  --   --   --  32 0*  --  34 0*   HEMATOCRIT, ISTAT %  --   --   --   --   --   --   --   --   --   --   --   --   --   --   --  29*  --   --   --   --    PLATELETS Thousands/uL  --  346  --   --  340  --   --   --  303  --   --   --  415* 389  --   --   --  333  --  298   SODIUM mmol/L 138  --  136 138  --  135* 135* 138  --  139   < > 139 141  --    < >  --    < > 131*   < > 132*   POTASSIUM mmol/L 4 5  --  4 8 3 8  --  3 7 3 8 3 8  --  4 2   < > 3 7 3 6  --    < >  --    < > 3 4*   < > 5 6*   CHLORIDE mmol/L 107  --  106 105  --  104 104 106  --  107   < > 106 108  --    < >  --    < > 101   < > 98*   CO2 mmol/L 23  --  22 24  --  21 23 22  --  22   < > 23 22  --    < >  --    < > 19*   < > 22   CO2, I-STAT mmol/L  --   --   --   --   --   --   --   --   --   --   --   --   --   --   --  22  --   --   --   --    BUN mg/dL 40*  --  46* 57*  --  61* 60* 58*  --  59*   < > 53* 51*  --    < >  --    < > 58*   < > 62*   CREATININE mg/dL 1 20  --  1 43* 1 62*  --  1 79* 1 75* 1 65*  --  1 59*   < > 1 33* 1 33*  --    < >  --    < > 1 91*   < > 2 64*   CALCIUM mg/dL 8 7  --  8 7 8 6  --  8 4 8 7 9 6  --  8 3   < > 7 9* 8 1*  --    < >  --    < > 8 8   < > 9 3   MAGNESIUM mg/dL 2 1  --  2 4 2 5  --  2 7*  --   --  2 9*  --   --  2 9*  --  2 5  --   --    < > 1 1*  --   --    PHOSPHORUS mg/dL 2 2*  --  2 8 3 5  --  4 6*  --   --  4 1  --   --   --   --  5 0*  --   --   --  3 4  --   --    GLUCOSE, ISTAT mg/dl  --   --   --   --   --   --   --   --   --   --   --   --   --   --   --  230*  --   --   --   --     < > = values in this interval not displayed  US kidney and bladder   Final Result by Darya Cornejo MD (01/03 1290)      1  No sonographically evident urolithiasis, hydronephrosis, or other acute findings  2   Bladder poorly evaluated due to Nguyen catheter        3   Unchanged benign renal cysts without lesions warranting further dedicated imaging workup  4   Mild ascites  Workstation performed: [id number]         XR chest portable   Final Result by Tila Alvarez MD (01/03 1420)      Left jugular catheter at cavoatrial junction with no pneumothorax  Malpositioned right central catheter in the carotid artery and aorta, known to the clinical service  Left greater than right consolidation which could be due to asymmetric edema or pneumonia  Workstation performed: [id number]         XR chest portable ICU   Final Result by Tila Alvarez MD (41/91 [id number])      Malpositioned catheter in the aorta, known to the clinical team       Persistent bilateral pneumonia/post infectious scar, greater on the left  No definite edema  Workstation performed: [id number]         X-ray chest 1 view   Final Result by Simin Castellanos MD (01/01 1208)      Worsening left mid to lower lung zone airspace consolidation consistent with pneumonia  Endotracheal tube has been inserted with tip 4 5 cm above the cynthia  Again seen is a right neck central line, with tip in the ascending thoracic aorta confirmed on prior CT  Workstation performed: [id number]         XR abdomen 1 view kub    (Results Pending)   XR chest portable ICU    (Results Pending)       Portions of the record may have been created with voice recognition software  Occasional wrong word or "sound a like" substitutions may have occurred due to the inherent limitations of voice recognition software  Read the chart carefully and recognize, using context, where substitutions have occurred

## 2021-01-04 NOTE — CONSULTS
Consultation - Infectious Disease   Rita Anderson 77 y o  female MRN: [id number]  Unit/Bed#: [facility] 541-26 Encounter: [id number]      IMPRESSION & RECOMMENDATIONS:     77year old female with agib on eliquis, HTN, CAD, cardiomyopathy, CHF, who presented with diarrhea, RICHI, R gluteal cellulitis and arterially placed triple lumen catheter on 12/31, now in ICU on multiple pressors and intubated for acute respiratory failure, declining clinically  1  Shock: Septic vs Cardiogenic  ED admission on 12/31  She was subsequently admitted to med/surg for sepsis for RICHI, hyperkalemia, and cellulitis to R buttock  Central line R IJ catheter was inserted due to poor peripheral venous access but was malpositioned  Repeat CT chest 12/31 showed malposition of R neck catheter with arterial extension into carotid artery, tip terminating in the ascending aorta, mild fluid and soft tissue swelling but no large measurable hematoma  Patient became tachycardic and hypotensive and EKG showed A fib with AVR and was unclear if if hypotension was secondary to ongoing sepsis or malpositioned catheter  Patient transferred to [facility] for worsening sepsis as well as evaluation by vascular surgery  Patient on multiple pressors currently in ICU and has been intubated since 1/1 for acute hypoxemic respiratory failure  CVVH started early this morning  Underlying cardiac problems and malpositioned catheter as well as sepsis probably contributing to low MAPs with pressor requirements  Echo 1/1 showed EF 20-25% with severe diffuse hypokinesis and severe MV regurgitation  Sepsis probably secondary to R gluteal cellulitis (see #2) but may also be from potential L sided pneumonia  ABG showing pO2 of 70  Procalc elevated today to 12 6, down from 22 2  Lactate elevated to 2 7, down from 3 4  Elevated creatinine to 1 43 with significantly decreased urine output    - Patient currently on Levo, Epi, Vaso, and milrinone gtt   Pressor control per ICU   - Elevated liver enzymes (/) likely a result of ischemic hepatitis   - Continue to trend CBC, lactate, ABG, and CMP  - Continue to follow up on blood cultures  NGTD  - Nephrology is following for RICHI   - Wound cultures from buttock on 1/2 grew 3+ MRSA, susceptible to vancomycin  - Continue IV vancomycin, cefepime, and metronidazole for empiric coverage  - Remainder of care per ICU  - Consider palliative care consult     2  R gluteal cellulitis  [facility]ed on [facility] to [facility] of pain and erythema in her R buttock that her visiting nurse assessed as a possible abscess  On 12/22, she started having watery diarrhea (3-4 BMs per day) and had difficulty keeping self clean  next few days, She noticed worsening pain in her R buttock with erythema, which prompted ED admission  CT 12/31 pelvis showed subcutaneous edema of R gluteal region, consistent with cellulitis, no focal fluid collection to suggest presence of abscess, no soft tissue gas identified  Patient started on IV cefepime, vancomycin and flagyl day #4  Cellulitis of R buttock has not spread and no evidence of spreading soft tissue infection or abscess,   - S/p debridement with I & D by general surgery on 1/2 with cultures taken  - S/p repeat washout and debridement on 1/3  - Blood cultures taken 12/31 and 1/1: NGTD  - Wound cultures from buttock on 1/2 and 1/3 grew 3+ MRSA, susceptible to vancomycin     - Continue IV vancomycin, cefepime, and metronidazole for empiric coverage    - Wound care per general surgery team    3  Possible L side Pneumonia vs Asymmetric Pulmonary Edema   Patient with increased work of breathing and decreased L sided breath sounds  CXR on 1/1 showed worsening L mid to lower lung zone air space consolidation consistent with pneumonia  CXR 1/3 showing L greater than R consolidation, which could be due to asymmetric edema or pneumonia   May be infectious process or atelectasis; less likely a result of CHF due to greater L sided involvement although patient has been fluid overloaded from multiple drips  Pneumonia might be secondary to aspiration, hematologic seeding, or ventilator-associated  4  Diarrhea  Watery diarrhea occurred in week leading up to ED admission  Difficult for patient to keep area clean, which may have resulted in cellulitis  Since resolved  5  Previous COVID 19 Pneumonia Hospitalization  Recent hospitalization from 11/11 to 11/25 for COVID19 penumonia requiring ICU admission with HFNC supplemental oxygen s/p Tx with IV remdesivir, IV steroids, Pepcid , vit C, vit D, zinc, Lipitor, and convalescent plasma therapy, significantly improved and D/Cd on 2L NC to short term rehab SNF  CT chest on 12/31 showed  reticular opacities throughout the lung parenchyma, with basilar predominance  This is probably residual scarring from patient's recent COVID-19 pneumonia  There is no airspace consolidation currently  Acute respiratory failure most likely not a result of COVID19 reinfection or reactivation  Antibiotics  Vancomycin day #4  Cefepime day #5  Metronidazole day #4    Have discussed the above management plan in detail with the primary service    Extensive review of the medical records in epic including review of the notes, radiographs, and laboratory results     HISTORY OF PRESENT ILLNESS:  Reason for Consult: Septic Shock  HPI: Laura Yousif is a 77y o  year old female with PMHx sysytolic CHF, mitral regurgitation, breast cancer on aromatase inhibitor, MALT lymphoma 2013, non-Hodgkin lymphoma 1999, CKD3, and persistent a fib on Eliquis who presented on 12/31 to [facility] for evaluation of pain and erythema in her R buttock that her visiting nurse assessed as a possible abscess  She was hospitalized with COVID19 pneumonia from 11/11 to 11/25, s/p IV remdesivir, IV steroids, and convalescent plasma, and recovered but became very weak/frail from it   She was discharged to short term rehab facility because she was requiring a walker to ambulate  She went home on December 21 with nursing aide  On 12/22, she started having watery diarrhea (3-4 BMs per day) and had difficulty keeping self clean, possibly leading to abscess  The next few days, she noticed worsening pain in her R buttock with erythema, which prompted ED admission on 12/31  R buttock was very tender, especially when sitting/lying on it  She was subsequently admitted to med/surg for sepsis for RICHI, hyperkalemia, and cellulitis to R buttock  Central line R IJ catheter was inserted due to poor peripheral venous access but was malpositioned on CT scan and found to have terminated into her mediastinum and was intraarterial, probably in R carotid artery  Patient became tachycardic and hypotensive and EKG showed A fib with RVR and was unclear if if hypotension was secondary to ongoing sepsis or malpositioned catheter  Peripheral access established in L foot with IV catheter and NS bolus 500 cc initiated  Recommended to transfer to B for worsening sepsis as well as evaluation by vascular surgery  Pressors initiated prior to arrival  CT 12/31 pelvis showed subcutaneous edema of R gluteal region, consistent with cellulitis, no focal fluid collection to suggest presence of abscess, no soft tissue gas identified  Patient started on vancomycin, cefepime, and flagyl IV  Patient intubated on 1/1 for acute hypoxemic respiratoy failure  Cellulitis of R buttock has not spread and no evidence of spreading soft tissue infection or abscess, s/p debridement and I and D on 1/2 with cultures taken  Remains critically ill in ICU, currently on Levo and Vaso with increasing pressor requirements and heparin gtt for TLC in ascending aorta, on fentanyl sedation, on milrinone and epinephrine infusion and stress dose steroids  Blood cultures have NGTD  OR cultures growing 3+ MRSA  Repeat washout and debridement on 1/3   Patient with RICHI requiring Bumex infusion and possible need for dialysis  CVVH started early this morning, running even  Patient seen and examined in room  She is currently intubated and on sedation with fentanyl  She is able to respond to her name and looks uncomfortable when breathing  REVIEW OF SYSTEMS:  Unable to assess since patient is ventilated     PAST MEDICAL HISTORY:  Past Medical History:   Diagnosis Date    A-fib [facility])     Breast cancer ([facility] )     s/p right mastectomy    CHF (congestive heart failure) ([place] [address] )     Coronary artery disease     Dilated cardiomyopathy ([place] [address] )     Essential (primary) hypertension     Hx of echocardiogram 06/05/2015    EF0 45 (45%) mild mitral regurg   / 2D w/CFD; EF0 43 (43%) mild MR 11/25/16    Hypertension     Mitral regurgitation     Non Hodgkin's lymphoma ([place] [address] )     Nonrheumatic mitral (valve) insufficiency     Obesity     Persistent atrial fibrillation (HCC)     Renal disorder      Past Surgical History:   Procedure Laterality Date    MASTECTOMY Right 2018    TOTAL KNEE ARTHROPLASTY Right 2013       FAMILY HISTORY:  Non-contributory    SOCIAL HISTORY:  Social History   Social History     Substance and Sexual Activity   Alcohol Use Never    Frequency: Never     Social History     Substance and Sexual Activity   Drug Use Never     Social History     Tobacco Use   Smoking Status Former Smoker   Smokeless Tobacco Never Used       ALLERGIES:  Allergies   Allergen Reactions    Celecoxib Other (See Comments)    Other Other (See Comments)     Skin irritated and opened up       MEDICATIONS:  All current active medications have been reviewed    Scheduled Meds:  Current Facility-Administered Medications   Medication Dose Route Frequency Provider Last Rate    acetaminophen  975 mg Oral Q8H PRN Georgi Riley MD      amiodarone  1 mg/min Intravenous Continuous Georgi Riley MD 1 mg/min (01/03/21 1905)    cefepime  1,000 mg Intravenous Q12H Georgi Riley MD 1,000 mg (01/04/21 0301)    chlorhexidine  15 mL Swish & Spit Q12H [address], [place]      epinephrine  5 mcg/min Intravenous Continuous Juanpablo Torres PA-C 5 mcg/min (01/04/21 0503)    fentaNYL  75 mcg/hr Intravenous Continuous Ricki Treviño MD 75 mcg/hr (01/04/21 0519)    fentanyl citrate (PF)  50 mcg Intravenous Q1H PRN Ricki Treviño MD      heparin (porcine)  3-20 Units/kg/hr (Order-Specific) Intravenous Titrated Ricki Treviño MD 22 Units/kg/hr (01/04/21 0532)    heparin (porcine)  2,000 Units Intravenous Q1H PRN Ricki Treviño MD      heparin (porcine)  4,000 Units Intravenous Q1H PRN Ricki Treviño MD      hydrocortisone sodium succinate  50 mg Intravenous Q6H [address], MD      insulin regular (HumuLIN R,NovoLIN R) infusion  0 3-21 Units/hr Intravenous Titrated Ricki Treviño MD 0 5 Units/hr (01/04/21 0823)    melatonin  6 mg Oral HS Ricki Treviño MD      metolazone  10 mg Oral Daily Ricki Treviño MD      metroNIDAZOLE  500 mg Intravenous [facility] Ricki Treviño  mg (01/04/21 0926)    milrinone (PRIMACOR) infusion  0 13 mcg/kg/min Intravenous Continuous RENNY Willis 0 13 mcg/kg/min (01/04/21 1030)    norepinephrine  1-30 mcg/min Intravenous Titrated Ricki Treviño MD 29 mcg/min (01/04/21 1041)    NxStage K 4/Ca 3  20,000 mL Dialysis Continuous Omkar Awan DO      OLANZapine  10 mg Oral HS Nargis Mannnelldamaris V, CRNP      pantoprazole  40 mg Intravenous Q12H [address], MD      polyethylene glycol  17 g Oral Daily PRN Ricki Treviño MD      senna-docusate sodium  1 tablet Oral HS Ricki Treviño MD      sodium chloride  5 mL/hr Intravenous Continuous Ricki Treviño MD 5 mL/hr (01/01/21 0320)    sodium phosphate  6 mmol Intravenous Once Fide Mckeon DO      vancomycin  15 mg/kg Intravenous Q24H Ricki Treviño MD Stopped (01/03/21 1502)    vasopressin (PITRESSIN) in 0 9 % sodium chloride 100 mL  0 04 Units/min Intravenous Continuous Ricki Treviño MD 0 04 Units/min (21 040)     Continuous Infusions:amiodarone, 1 mg/min, Last Rate: 1 mg/min (21 1905)  epinephrine, 5 mcg/min, Last Rate: 5 mcg/min (21 0503)  fentaNYL, 75 mcg/hr, Last Rate: 75 mcg/hr (21 0519)  heparin (porcine), 3-20 Units/kg/hr (Order-Specific), Last Rate: 22 Units/kg/hr (21 0532)  insulin regular (HumuLIN R,NovoLIN R) infusion, 0 3-21 Units/hr, Last Rate: 0 5 Units/hr (21 0823)  milrinone (PRIMACOR) infusion, 0 13 mcg/kg/min, Last Rate: 0 13 mcg/kg/min (21 1030)  norepinephrine, 1-30 mcg/min, Last Rate: 29 mcg/min (21 1041)  NxStage K 4/Ca 3, 20,000 mL  sodium chloride, 5 mL/hr, Last Rate: 5 mL/hr (21 0320)  vasopressin (PITRESSIN) in 0 9 % sodium chloride 100 mL, 0 04 Units/min, Last Rate: 0 04 Units/min (21 040)      PRN Meds:   acetaminophen    fentanyl citrate (PF)    heparin (porcine)    heparin (porcine)    polyethylene glycol    PHYSICAL EXAM:  Temp:  [95 7 °F (35 4 °C)-98 6 °F (37 °C)] 98 6 °F (37 °C)  HR:  [100-128] 120  Resp:  [17-32] 26  BP: (86)/(49) 86/49  SpO2:  [3 %-100 %] 97 %  Temp (24hrs), Av °F (36 1 °C), Min:95 7 °F (35 4 °C), Max:98 6 °F (37 °C)  Current: Temperature: 98 6 °F (37 °C)    Intake/Output Summary (Last 24 hours) at 2021 0915  Last data filed at 2021 0900  Gross per 24 hour   Intake 4961 31 ml   Output 2951 ml   Net  31 ml       General Appearance:  Toxic appearing patient in acute distress and discomfort, intubated   Head:  Normocephalic, without obvious abnormality, atraumatic   Eyes:  Conjunctiva pink and sclera anicteric, both eyes   Nose: Nares normal, mucosa normal, no drainage   Throat: Patient is intubated    Neck: R intraarterial catheter and L IJ catheter in place, no erythema or swelling around sites        Lungs:   Respirations labored; coarse breath sounds heard throughout, decreased breath sounds on L side    Chest Wall:  No tenderness or deformity   Heart:  Tachycardic, irregular rhythm; no murmur, rub or gallop   Abdomen:   Decreased bowel sounds, R sided tenderness, soft, non-distended     Extremities: No cyanosis, clubbing or edema; distal pulses intact    Skin: R buttock wound pic seen in epic  Looks clean after multiple debridement with mild surrounding erythema and deeper involvement than initial pic  Lymph nodes: Cervical, supraclavicular nodes normal   Neurologic: Alert and oriented, No gross focal neurological deficits        LABS, IMAGING, & OTHER STUDIES:  Lab Results:  I have personally reviewed pertinent labs  Results from last 7 days   Lab Units 01/04/21  0507 01/04/21 0416 01/03/21 2215 01/03/21  1830  01/03/21  0430   WBC Thousand/uL 38 05*  --   --  38 67*  --  35 56*   HEMOGLOBIN g/dL 8 5* 8 6* 8 5* 8 4*   < > 8 8*   PLATELETS Thousands/uL 346  --   --  340  --  303    < > = values in this interval not displayed  Results from last 7 days   Lab Units 01/04/21  0507 01/04/21 0416 01/03/21 2215 01/03/21  1750  01/03/21  0430  01/02/21  0338  01/02/21  0027   SODIUM mmol/L  --  136 138 135*   < >  --    < >  --    < >  --    POTASSIUM mmol/L  --  4 8 3 8 3 7   < >  --    < >  --    < >  --    CHLORIDE mmol/L  --  106 105 104   < >  --    < >  --    < >  --    CO2 mmol/L  --  22 24 21   < >  --    < >  --    < >  --    CO2, I-STAT mmol/L  --   --   --   --   --   --   --   --   --  22   BUN mg/dL  --  46* 57* 61*   < >  --    < >  --    < >  --    CREATININE mg/dL  --  1 43* 1 62* 1 79*   < >  --    < >  --    < >  --    EGFR ml/min/1 73sq m  --  36 31 28   < >  --    < >  --    < >  --    GLUCOSE, ISTAT mg/dl  --   --   --   --   --   --   --   --   --  230*   CALCIUM mg/dL  --  8 7 8 6 8 4   < >  --    < >  --    < >  --    AST U/L 793*  --   --   --   --  2,025*  --  50*  --   --    ALT U/L 709*  --   --   --   --  928*  --  28  --   --    ALK PHOS U/L 132*  --   --   --   --  100  --  125*  --   --     < > = values in this interval not displayed       Results from last 7 days   Lab Units 01/02/21  0817 01/01/21  0508 01/01/21  0406 01/01/21  0347 12/31/20  2205 12/31/20  1816 12/31/20  1737   BLOOD CULTURE   --  No Growth at 48 hrs  No Growth at 72 hrs   --   --  No Growth at 48 hrs  No Growth at 48 hrs  GRAM STAIN RESULT  No polys seen*  Rare Gram positive cocci in clusters*  --   --   --   --   --   --    MRSA CULTURE ONLY   --   --   --   --  No Methicillin Resistant Stapylococcus aureus (MRSA) after 24 hours  --   --    C DIFF TOXIN B BY PCR   --   --   --  Negative  --   --   --      Results from last 7 days   Lab Units 01/04/21  0507 01/03/21  0835 01/03/21  0430 01/02/21  0338 01/01/21  0508 01/01/21  0205   PROCALCITONIN ng/ml 12 65* 22 23* 21 47* 25 71* 11 45* 6 90*             Results from last 7 days   Lab Units 12/31/20  1207   D-DIMER QUANTITATIVE ug/ml FEU 2 11*       Imaging Studies:   I have personally reviewed pertinent imaging study reports and images in PACS  US kidney and bladder   Final Result by Francisca Zazueta MD (01/03 1740)      1  No sonographically evident urolithiasis, hydronephrosis, or other acute findings  2   Bladder poorly evaluated due to Nguyen catheter  3   Unchanged benign renal cysts without lesions warranting further dedicated imaging workup  4   Mild ascites  Workstation performed: [id number]         XR chest portable   Final Result by Ehsan Naranjo MD (01/03 1420)      Left jugular catheter at cavoatrial junction with no pneumothorax  Malpositioned right central catheter in the carotid artery and aorta, known to the clinical service  Left greater than right consolidation which could be due to asymmetric edema or pneumonia  Workstation performed: [id number]         XR chest portable ICU   Final Result by Ehsan Naranjo MD (31/10 3688)      Malpositioned catheter in the aorta, known to the clinical team       Persistent bilateral pneumonia/post infectious scar, greater on the left    No definite edema  Workstation performed: [id number]         X-ray chest 1 view   Final Result by Jorge Morris MD (01/01 1208)      Worsening left mid to lower lung zone airspace consolidation consistent with pneumonia  Endotracheal tube has been inserted with tip 4 5 cm above the cynthia  Again seen is a right neck central line, with tip in the ascending thoracic aorta confirmed on prior CT  Workstation performed: [id number]         XR abdomen 1 view kub    (Results Pending)       Other Studies:   I have personally reviewed pertinent reports      Fatemeh Gómez MS4  [facility]  [address] [facility]

## 2021-01-04 NOTE — UTILIZATION REVIEW
Initial Clinical Review    Admission: Date/Time/Statement:   Admission Orders (From admission, onward)     Ordered        12/31/20 1437  Inpatient Admission  Once                   Orders Placed This Encounter   Procedures    Inpatient Admission     Standing Status:   Standing     Number of Occurrences:   1     Order Specific Question:   Admitting Physician     Answer:   Caleb Garrison     Order Specific Question:   Level of Care     Answer:   Med Surg [16]     Order Specific Question:   Estimated length of stay     Answer:   More than 2 Midnights     Order Specific Question:   Certification     Answer:   I certify that inpatient services are medically necessary for this patient for a duration of greater than two midnights  See H&P and MD Progress Notes for additional information about the patient's course of treatment  ED Arrival Information     Expected Arrival Acuity Means of Arrival Escorted By Service Admission Type    - 12/31/2020 10:56 Urgent Ambulance [facility] General Medicine Urgent    Arrival Complaint    buttocks pain        Chief Complaint   Patient presents with    Abscess     pt reports abscess on right buttocks started approx saturday - pt also reports sob     Assessment/Plan:   77 y o  female who presents emergency department with the complaints of erythema and pain in the right buttock  The patient was recently discharged from short-term rehabilitation after a prolonged hospitalization for COVID-19 pneumonia in November 2020  Over the last 3-4 days, the patient has developed erythema and pain in the right buttock region, described as a sharp quality and constant in nature  The patient has also had watery diarrhea over the last few days  Hx afib/Eliquis, breast ca, chf, htn, non-hodgkin's lymphoma  Presents hypertensive & tachycardic with s/s as above  Admitted to inpatient status for sepsis      She did receive a dose of IV cefepime for sepsis and Kayexalate for treatment of hyperkalemia  Central line inserted due to multiple unsuccessful attempts to gain peripheral IV access  Patient was noted to be  tachycardic and hypotensive  EKG showed A-fib with RVR  I was notified by radiology that catheter place in right neck may have been malpositioned and recommended CT scan to confirm  CT chest confirmed  that right neck catheter was malpositioned  Patient continued to be Tachycardiac and Hypotensive  It was unclear at that time if patient's tachycardia and hypotension was secondary to ongoing sepsis/septic shock or malpositioned catheter or combination of both  Peripheral IV access was established in left foot with 20 gauge IV catheter  Normal saline bolus 500 cc initiated  Recommendations given to contact her vascular surgery and  initiate transfer  I notified vascular surgery agrees with transfer to [facility]  ER attending placed central line  IV Jose-Synephrine initiated  Patient remained stable after initiation of IV Jose-Synephrine infusion  Transferred to [facility] 1/1/21 for higher level of care      ED Triage Vitals [12/31/20 1103]   Temperature Pulse Respirations Blood Pressure SpO2   98 5 °F (36 9 °C) (!) 106 22 (!) 121/103 95 %      Temp Source Heart Rate Source Patient Position - Orthostatic VS BP Location FiO2 (%)   Temporal Monitor Sitting Right arm --      Pain Score       No Pain          Wt Readings from Last 1 Encounters:   01/04/21 96 7 kg (213 lb 3 oz)     Additional Vital Signs:   12/31/20 20:08:39  --  133Abnormal   24Abnormal   90/45Abnormal   60  95 %  --  --  --  --   12/31/20 18:33:01  97 8 °F (36 6 °C)  --  --  --  --  --  --  --  --  --   12/31/20 1759  --  --  --  --  --  96 %  --  --  --  --   12/31/20 17:06:21  101 3 °F (38 5 °C)Abnormal   121Abnormal   20  104/47Abnormal   66  94 %  --  --  --  --   12/31/20 1638  --  --  --  --  --  --  --  --  None (Room air)  --   12/31/20 1500  --  121Abnormal   22  116/62  --  94 %  --  --  None (Room air) Sitting   12/31/20 1430  --  116Abnormal   22  117/57  73  95 %  --  --  None (Room air)  Sitting   12/31/20 1400  --  113Abnormal   22  --  --  94 %  --  --  None (Room air)  --     Pertinent Labs/Diagnostic Test Results:   Results from last 7 days   Lab Units 01/04/21  0957 01/04/21  0507 01/04/21  0416 01/03/21  2215 01/03/21  1830  01/03/21  0430 01/02/21  1001 01/02/21  0338  01/01/21  0205 12/31/20  1207   WBC Thousand/uL  --  38 05*  --   --  38 67*  --  35 56* 34 67* 30 07*  --  23 68* 21 23*   HEMOGLOBIN g/dL 8 4* 8 5* 8 6* 8 5* 8 4*   < > 8 8* 10 0* 10 1*   < > 10 5* 11 0*   I STAT HEMOGLOBIN   --   --   --   --   --   --   --   --   --    < >  --   --    HEMATOCRIT %  --  25 8*  --   --  26 2*  --  26 7* 30 1* 30 5*  --  32 0* 34 0*   HEMATOCRIT, ISTAT   --   --   --   --   --   --   --   --   --    < >  --   --    PLATELETS Thousands/uL  --  346  --   --  340  --  303 415* 389  --  333 298   NEUTROS ABS Thousands/µL  --   --   --   --   --   --  30 49*  --   --   --  20 01* 17 69*   BANDS PCT %  --   --   --   --   --   --   --   --  3  --   --   --     < > = values in this interval not displayed  Results from last 7 days   Lab Units 01/04/21  0957 01/04/21  0416 01/03/21 2215 01/03/21  1750 01/03/21  1603  01/03/21  0430   SODIUM mmol/L 138 136 138 135* 135*   < >  --    POTASSIUM mmol/L 4 5 4 8 3 8 3 7 3 8   < >  --    CHLORIDE mmol/L 107 106 105 104 104   < >  --    CO2 mmol/L 23 22 24 21 23   < >  --    ANION GAP mmol/L 8 8 9 10 8   < >  --    BUN mg/dL 40* 46* 57* 61* 60*   < >  --    CREATININE mg/dL 1 20 1 43* 1 62* 1 79* 1 75*   < >  --    EGFR ml/min/1 73sq m 45 36 31 28 28   < >  --    CALCIUM mg/dL 8 7 8 7 8 6 8 4 8 7   < >  --    CALCIUM, IONIZED mmol/L 1 11* 1 13 1 16 1 13  --   --  1 07*   MAGNESIUM mg/dL 2 1 2 4 2 5 2 7*  --   --  2 9*   PHOSPHORUS mg/dL 2 2* 2 8 3 5 4 6*  --   --  4 1    < > = values in this interval not displayed       Results from last 7 days   Lab Units 01/04/21  0507 01/03/21  3139 01/03/21  0430 01/02/21  0338 01/01/21  0205 12/31/20  1207   AST U/L 793*  --  2,025* 50* 21 20   ALT U/L 709*  --  928* 28 15 18   ALK PHOS U/L 132*  --  100 125* 133* 142*   TOTAL PROTEIN g/dL 5 6* 4 8* 5 5* 5 5* 6 0* 6 7   ALBUMIN g/dL 2 5*  --  2 4* 2 2* 2 2* 2 5*   TOTAL BILIRUBIN mg/dL 2 49*  --  1 89* 1 50* 1 68* 1 30*   BILIRUBIN DIRECT mg/dL 1 31*  --  1 41* 1 24*  --   --      Results from last 7 days   Lab Units 01/04/21  1213 01/04/21  0957 01/04/21  0822 01/04/21  8301 01/04/21  0420 01/04/21  0212 01/04/21  0022 01/03/21  2221 01/03/21 2014 01/03/21  1730 01/03/21  1602 01/03/21  1421   POC GLUCOSE mg/dl 123 111 117 152* 172* 129 109 111 120 179* 198* 167*     Results from last 7 days   Lab Units 01/04/21  0957 01/04/21  0416 01/03/21  2215 01/03/21  1750 01/03/21  1603 01/03/21  0916 01/03/21  0410 01/02/21  2235 01/02/21  1720 01/02/21  1001 01/02/21  0330 01/01/21  2127   GLUCOSE RANDOM mg/dL 134 144* 116 157* 178* 192* 203* 194* 251* 235* 260* 210*     Results from last 7 days   Lab Units 01/02/21  0338   HEMOGLOBIN A1C % 6 0*   EAG mg/dl 126     Results from last 7 days   Lab Units 01/04/21  0957 01/04/21  0416 01/03/21  2217   PH ART  7 397 7 380 7 363   PCO2 ART mm Hg 32 0* 34 3* 41 1   PO2 ART mm Hg 70 8* 65 9* 69 4*   HCO3 ART mmol/L 19 2* 19 8* 22 9   BASE EXC ART mmol/L -4 9 -4 7 -2 4   O2 CONTENT ART mL/dL 11 4* 12 2* 12 4*   O2 HGB, ARTERIAL % 93 2* 91 7* 93 0*   ABG SOURCE  Line, Arterial Line, Arterial Line, Arterial     Results from last 7 days   Lab Units 01/04/21  1210 01/04/21  0511 01/03/21  2211   PH MINE  7 320 7 334 7  317   PCO2 MINE mm Hg 44 8 39 8* 47 1   PO2 MINE mm Hg 30 3* 34 8* 33 9*   HCO3 MINE mmol/L 22 6* 20 7* 23 6*   BASE EXC MINE mmol/L -3 4 -4 8 -2 6   O2 CONTENT MINE ml/dL 6 5 8 1 8 1   O2 HGB, VENOUS % 50 9* 62 0 61 7     Results from last 7 days   Lab Units 01/02/21  0027   I STAT BASE EXC mmol/L -4*   I STAT O2 SAT % 95*   ISTAT PH ART 7 350   I STAT ART PCO2 mm HG 38 0   I STAT ART PO2 mm HG 82 0   I STAT ART HCO3 mmol/L 21 0*     Results from last 7 days   Lab Units 01/01/21  0205 12/31/20  1207   TROPONIN I ng/mL 0 02 <0 02     Results from last 7 days   Lab Units 12/31/20  1207   D-DIMER QUANTITATIVE ug/ml FEU 2 11*     Results from last 7 days   Lab Units 01/04/21  0609 01/04/21  0006 01/03/21  1750 01/03/21  0430  01/01/21  0205   PROTIME seconds  --   --   --  25 2*  --  28 8*   INR   --   --   --  2 30*  --  2 74*   PTT seconds 89* 79* 51* 60*   < > 39*    < > = values in this interval not displayed  Results from last 7 days   Lab Units 01/04/21  0507 01/03/21  0835 01/03/21  0430 01/02/21  0338 01/01/21  0508   PROCALCITONIN ng/ml 12 65* 22 23* 21 47* 25 71* 11 45*     Results from last 7 days   Lab Units 01/04/21  1209 01/04/21  0957 01/04/21  0416 01/03/21  2215 01/03/21  1724 01/03/21  1603 01/03/21  1114   LACTIC ACID mmol/L 3 7* 2 7* 3 4* 2 5* 3 4* 3 3* 2 7*     Results from last 7 days   Lab Units 01/02/21  0330 12/31/20  1207   NT-PRO BNP pg/mL 88,411* 6,758*     Results from last 7 days   Lab Units 01/01/21  0942   CLARITY UA  Cloudy   COLOR UA  Dk Yellow   SPEC GRAV UA  1 019   PH UA  5 0   GLUCOSE UA mg/dl Negative   KETONES UA mg/dl Trace*   BLOOD UA  Large*   PROTEIN UA mg/dl 30 (1+)*   NITRITE UA  Negative   BILIRUBIN UA  Negative   UROBILINOGEN UA E U /dl 0 2   LEUKOCYTES UA  Small*   WBC UA /hpf 4-10*   RBC UA /hpf Innumerable*   BACTERIA UA /hpf Occasional   EPITHELIAL CELLS WET PREP /hpf Occasional     Results from last 7 days   Lab Units 01/01/21  0347   C DIFF TOXIN B BY PCR  Negative     Results from last 7 days   Lab Units 01/03/21  1218 01/02/21  0817 01/01/21  0508 01/01/21  0406 12/31/20  1816 12/31/20  1737   BLOOD CULTURE   --   --  No Growth at 72 hrs  No Growth at 72 hrs  No Growth at 48 hrs  No Growth at 48 hrs     GRAM STAIN RESULT  Rare Polys*  2+ Gram positive cocci in clusters* No polys seen*  Rare Gram positive cocci in clusters*  --   --   --   --    WOUND CULTURE  3+ Growth of Staphylococcus aureus*  --   --   --   --   --      Results from last 7 days   Lab Units 01/02/21  0338   TOTAL COUNTED  100     12/31  Cxr=1  Emphysema  2   Scattered areas of subsegmental atelectasis or fibrosis  3   Patchy opacities in both lungs, with improvement since 11/18/2020  No new abnormality in the chest   Ct pelvis=Subcutaneous edema of the right gluteal region, consistent with cellulitis in the appropriate clinical setting  No focal fluid collection to suggest the presence of abscess  No soft tissue gas identified  Ct chest=There are reticular opacities throughout the lung parenchyma, with basilar predominance  This is probably residual scarring from patient's recent COVID-19 pneumonia  There is no airspace consolidation currently  There is moderate emphysema  Cxr= Right internal jugular approach central venous catheter tip overlies the superior heart margin  The catheter tip crosses midline and is directed towards the left  Findings raise concern for possible arterial line placement  Recommend CT of the chest   to evaluate line placement and exclude vascular injury  Scattered reticular opacities throughout the lungs compatible with residua of recent Covid pneumonia  CT chest=Malposition of right neck catheter with arterial extension, its tip terminating in the ascending aorta  Mild stranding/fluid and soft tissue swelling in the perivascular region right lateral to the thyroid however no large measurable hematoma  No pneumothorax or pericardial effusion      ED Treatment:   Medication Administration from 12/31/2020 1056 to 12/31/2020 1618       Date/Time Order Dose Route Action     12/31/2020 1526 sodium chloride 0 9 % bolus 1,000 mL   Intravenous Restarted     12/31/2020 1527 cefepime (MAXIPIME) IVPB (premix in dextrose) 2,000 mg 50 mL 2,000 mg Intravenous New Bag        Past Medical History: Diagnosis Date    A-fib [facility])     Breast cancer [facility])     s/p right mastectomy    [facility]F [facility]congestive heart failure) ([facility] )     Coronary artery disease     Dilated cardiomyopathy ([facility] )     Essential (primary) hypertension     Hx of echocardiogram 06/05/2015    EF0 45 (45%) mild mitral regurg   / 2D w/CFD; EF0 43 (43%) mild MR 11/25/16    Hypertension     Mitral regurgitation     Non Hodgkin's lymphoma ([facility] )     Nonrheumatic mitral (valve) insufficiency     Obesity     Persistent atrial fibrillation (HCC)     Renal disorder      Present on Admission:   Chronic systolic congestive heart failure (HCC)   Persistent atrial fibrillation (HCC)    Admitting Diagnosis: Dyspnea [R06 00]  Anemia [D64 9]  Buttock pain [M79 18]  Acute renal failure (ARF) (HCC) [N17 9]  Cellulitis of right buttock [L03 317]  Age/Sex: 77 y o  female  Admission Orders:  Contact & airborne isolation  Contact & hand hygiene isolation  Consult surgery  Telemetry  Consult renal  Pt/ot eval & tx  O2 to keep sats>92%    Scheduled Medications:  apixaban (ELIQUIS) tablet 5 mg   Dose: 5 mg Freq: 2 times daily Route: PO  atorvastatin (LIPITOR) tablet 40 mg   Dose: 40 mg Freq: Daily at bedtime Route: PO  saccharomyces boulardii (FLORASTOR) capsule 250 mg   Dose: 250 mg Freq: 2 times daily Route: PO  vancomycin (VANCOCIN) oral solution 125 mg   Dose: 125 mg Freq: Every 12 hours scheduled Route: PO    Continuous IV Infusions:  phenylephrine (ALETHA-SYNEPHRINE) 50 mg (STANDARD CONCENTRATION) in sodium chloride 0 9% 250 mL   Rate: 7 5-54 mL/hr Dose:  mcg/min  Start: 12/31/20 2330  sodium chloride 0 9 % infusion   Rate: 100 mL/hr     Network Utilization Review Department  ATTENTION: Please call with any questions or concerns to [phone] and carefully listen to the prompts so that you are directed to the right person   All voicemails are confidential   Annie Key all requests for admission clinical reviews, approved or denied determinations and any other requests to dedicated fax number below belonging to the campus where the patient is receiving treatment   List of dedicated fax numbers for the Facilities:  [address] [place] [address] DENIALS (Administrative/Medical Necessity) [phone]   [address] (Maternity/NICU/Pediatrics) [phone] [address] Cliff Eastman 7932 (  Babs Drake "Naomi" 103) [address] [address] Vic Sawyer 1481 [address] [place] [address] [phone]

## 2021-01-04 NOTE — PROGRESS NOTES
Daily Progress Note - [address] Armando Gomez 77 y o  female MRN: [id number]  Unit/Bed#: [facility] 513-01 Encounter: [id number]        ----------------------------------------------------------------------------------------  HPI/24hr events: S/p washout of buttock/labia yesterday  HD catheter placed in OR  Escalating levophed requirements prior to starting CVVH, given 2 amps bicarb  CVVH started and running even  This morning, levophed up to 28    ---------------------------------------------------------------------------------------  SUBJECTIVE  Unable to provide 2/2 intubation    Review of Systems  Review of systems was unable to be performed secondary to intubation  ---------------------------------------------------------------------------------------  Assessment and Plan:    Neuro:    Pain/sedation  o Fentanyl 75 mcg/hr  - Fentanyl 50 mcg Q1 prn (0 doses/24 hours)  o Daily CAM-ICU, delirium precautions  Regulate sleep/wake cycle  - Melatonin qhs      CV:    Shock - septic, cardiogenic  o Wean vasopressors as able for goal MAP > 65  Currently on levophed 23, epi 4, vaso 0 04, milrinone 0 25  SvO2 61 7, can consider weaning milrinone to 0 13  Continue stress dose steroids with hydrocortisone 50mg Q6 given persistent elevated pressor requirements   Persistent AFib, NSVT  o Amiodarone gtt at 1  Hold home metoprolol given pressor requirements  Optimize electrolytes  Home Eliquis on hold  Heparin gtt ACS   Acute on chronic systolic HF, severe MR  o Volume removal as able via CVVH, though pressor requirements preclude this   HLD  o Statin   Inadvertently placed aortic central line  o Vascular surgery following, though may need removal by CT Sx  Heparin gtt per recommendations  Pulm:   Acute hypoxic respiratory failure  o AC/VC 20/450/45/8  Goal SpO2 > 92%  Not a candidate for SBT given hemodynamic instability  VAP bundle   COVID-19 PNA - resolved      GI:    Transaminitis  o Trend daily  Avoid hepatotoxic medications   Bloody OG output  o Protonix IV BID  Monitor output   Bowel regimen  o Senna/colace      :    RICHI on CKD stage 3  o Ultimately requiring CVVH, currently running even  Ideally will run negative, though pressor requirements preclude this  Needs volume removal  Trend UOP  Consider d/c Zaroxolyn  F/E/N:    No maintenance fluids   Replete electrolytes as needed for goal Mag > 2 0, Phos >3 0, K >4 0  o Q6 CVVH repletion protocol   NPO, consider trickle tube feeds      Heme/Onc:    Anemia  o Transfuse for hemoglobin < 7 0   Hx of breast cancer, MALT lymphoma, non-hodgkin lymphoma  o Holding home arimidex  Completed R-CHOP for non-hodgkin lymphoma in 1999  Endo:    Steroid-induced hyperglycemia  o Insulin gtt, goal -180      ID:    R buttock cellulitis  o POD 2 s/p I&D R buttock and labia, POD 1 s/p washout R buttock  Appreciate surgery recommendations  Vancomycin/cefepime/flagyl, follow up wound cultures  Trend fever curve/white count  MSK/Skin:    Frequent turns/repositioning, offloading      Disposition: Continue Critical Care   Code Status: Level 1 - Full Code  ---------------------------------------------------------------------------------------  ICU CORE MEASURES    Prophylaxis   VTE Pharmacologic Prophylaxis: Heparin Drip  VTE Mechanical Prophylaxis: sequential compression device  Stress Ulcer Prophylaxis: Pantoprazole IV BID    ABCDE Protocol (if indicated)  Plan to perform spontaneous awakening trial today? Yes  Plan to perform spontaneous breathing trial today? No  Obvious barriers to extubation?  Yes  CAM-ICU: Positive    Invasive Devices Review  Invasive Devices     Central Venous Catheter Line            CVC Central Lines 12/31/20 Triple 20cm 3 days          Peripheral Intravenous Line            Peripheral IV 12/31/20 Left Leg 3 days          Arterial Line            Arterial Line 01/01/21 Radial 3 days          Hemodialysis Catheter HD Temporary Double Catheter less than 1 day          Drain            NG/OG/Enteral Tube Orogastric 18 Fr Center mouth 2 days    Urethral Catheter Non-latex 16 Fr  2 days          Airway            ETT  Cuffed; Hi-Lo 8 mm 2 days              Can any invasive devices be discontinued today? No  ---------------------------------------------------------------------------------------  OBJECTIVE    Vitals   Vitals:    21 2340 21 0000 21 0100 21 0301   BP:       BP Location:       Pulse:  (!) 110 (!) 112    Resp:  20 19    Temp:  (!) 96 8 °F (36 °C) (!) 97 2 °F (36 2 °C)    TempSrc:  Esophageal Esophageal    SpO2: 95% 96% 95% 95%   Weight:         Temp (24hrs), Av 7 °F (35 9 °C), Min:95 7 °F (35 4 °C), Max:97 7 °F (36 5 °C)  Current: Temperature: (!) 97 2 °F (36 2 °C)    Respiratory:  SpO2: SpO2: 95 %  Nasal Cannula O2 Flow Rate (L/min): 3 L/min    Invasive/non-invasive ventilation settings   Respiratory    Lab Data (Last 4 hours)       0416            pH, Arterial       7 380           pCO2, Arterial       34 3           pO2, Arterial       65 9           HCO3, Arterial       19 8           Base Excess, Arterial       -4 7                O2/Vent Data        0301   Most Recent         Vent Mode AC/VC+  AC/VC+      Resp Rate (BPM) (BPM) 20  20      VT (mL) (mL) 450  450      Insp Time (S) (S) 0 9  0 9      FIO2 (%) (%) 40  40      PEEP (cmH2O) (cmH2O) 8  8      Rise Time (%) (%) 50  50      MV (Obs) 12 7  12 7                  Physical Exam  Vitals signs and nursing note reviewed  Constitutional:       General: She is not in acute distress  Interventions: She is sedated, intubated and restrained  Comments: Very awake this morning  CAM positive   HENT:      Head: Normocephalic and atraumatic  Eyes:      Pupils: Pupils are equal, round, and reactive to light  Neck:      Musculoskeletal: Neck supple  Comments: R TLC in place  L HD cath in place  Cardiovascular:      Rate and Rhythm: Regular rhythm  Tachycardia present  Heart sounds: Normal heart sounds  Heart sounds not distant  No murmur  No friction rub  No gallop  Pulmonary:      Effort: No tachypnea or respiratory distress  She is intubated  Breath sounds: No decreased breath sounds, wheezing, rhonchi or rales  Abdominal:      General: There is no distension  Palpations: Abdomen is soft  Tenderness: There is no abdominal tenderness  Musculoskeletal:      Right lower leg: Edema present  Left lower leg: Edema present  Skin:     General: Skin is warm  Neurological:      GCS: GCS eye subscore is 4  GCS verbal subscore is 1  GCS motor subscore is 6  Comments: Moves all extremities to command         Laboratory and Diagnostics:  Results from last 7 days   Lab Units 01/04/21  0416 01/03/21  2215 01/03/21  1830 01/03/21  1603 01/03/21  0916 01/03/21  0430 01/02/21  1001 01/02/21  0338 01/02/21  0027  01/01/21  0205 12/31/20  1207   WBC Thousand/uL  --   --  38 67*  --   --  35 56* 34 67* 30 07*  --   --  23 68* 21 23*   HEMOGLOBIN g/dL 8 6* 8 5* 8 4* 8 8* 8 8* 8 8* 10 0* 10 1*  --    < > 10 5* 11 0*   I STAT HEMOGLOBIN g/dl  --   --   --   --   --   --   --   --  9 9*  --   --   --    HEMATOCRIT %  --   --  26 2*  --   --  26 7* 30 1* 30 5*  --   --  32 0* 34 0*   HEMATOCRIT, ISTAT %  --   --   --   --   --   --   --   --  29*  --   --   --    PLATELETS Thousands/uL  --   --  340  --   --  303 415* 389  --   --  333 298   NEUTROS PCT %  --   --   --   --   --  86*  --   --   --   --  85* 84*   BANDS PCT %  --   --   --   --   --   --   --  3  --   --   --   --    MONOS PCT %  --   --   --   --   --  8  --   --   --   --  10 9   MONO PCT %  --   --   --   --   --   --   --  6  --   --   --   --     < > = values in this interval not displayed       Results from last 7 days   Lab Units 01/04/21  0416 01/03/21  2215 01/03/21  1750 01/03/21  1603 01/03/21  5957 01/03/21  0430 01/03/21 0410 01/02/21 2235  01/02/21  0338  01/01/21  0205  12/31/20  1207   SODIUM mmol/L 136 138 135* 135* 138  --  139 136   < >  --    < > 131*   < > 132*   POTASSIUM mmol/L 4 8 3 8 3 7 3 8 3 8  --  4 2 3 7   < >  --    < > 3 4*   < > 5 6*   CHLORIDE mmol/L 106 105 104 104 106  --  107 104   < >  --    < > 101   < > 98*   CO2 mmol/L 22 24 21 23 22  --  22 22   < >  --    < > 19*   < > 22   CO2, I-STAT   --   --   --   --   --   --   --   --   --   --    < >  --   --   --    ANION GAP mmol/L 8 9 10 8 10  --  10 10   < >  --    < > 11   < > 12   BUN mg/dL 46* 57* 61* 60* 58*  --  59* 52*   < >  --    < > 58*   < > 62*   CREATININE mg/dL 1 43* 1 62* 1 79* 1 75* 1 65*  --  1 59* 1 49*   < >  --    < > 1 91*   < > 2 64*   CALCIUM mg/dL 8 7 8 6 8 4 8 7 9 6  --  8 3 8 6   < >  --    < > 8 8   < > 9 3   GLUCOSE RANDOM mg/dL 144* 116 157* 178* 192*  --  203* 194*   < >  --    < > 145*   < > 157*   ALT U/L  --   --   --   --   --  928*  --   --   --  28  --  15  --  18   AST U/L  --   --   --   --   --  2,025*  --   --   --  50*  --  21  --  20   ALK PHOS U/L  --   --   --   --   --  100  --   --   --  125*  --  133*  --  142*   ALBUMIN g/dL  --   --   --   --   --  2 4*  --   --   --  2 2*  --  2 2*  --  2 5*   TOTAL BILIRUBIN mg/dL  --   --   --   --   --  1 89*  --   --   --  1 50*  --  1 68*  --  1 30*    < > = values in this interval not displayed  Results from last 7 days   Lab Units 01/04/21  0416 01/03/21  2215 01/03/21  1750 01/03/21  0430 01/02/21  1720 01/02/21  0338 01/01/21  2127  01/01/21  0205   MAGNESIUM mg/dL 2 4 2 5 2 7* 2 9* 2 9* 2 5 2 7*   < > 1 1*   PHOSPHORUS mg/dL 2 8 3 5 4 6* 4 1  --  5 0*  --   --  3 4    < > = values in this interval not displayed        Results from last 7 days   Lab Units 01/04/21  0006 01/03/21  1750 01/03/21  0430 01/03/21  0013 01/02/21  1758 01/02/21  1001 01/02/21  0618  01/01/21  0205   INR   --   --  2 30*  --   --   --   --   --  2 74*   PTT seconds 79* 51* 60* 60* 60* 58* 60*   < > 39*    < > = values in this interval not displayed  Results from last 7 days   Lab Units 21  0205 20  1207   TROPONIN I ng/mL 0 02 <0 02     Results from last 7 days   Lab Units 21  0416 21  2215 21  1724 21  1603 21  1114 21  0916 21  0410   LACTIC ACID mmol/L 3 4* 2 5* 3 4* 3 3* 2 7* 3 3* 4 1*     ABG:  Results from last 7 days   Lab Units 21  0416   PH ART  7 380   PCO2 ART mm Hg 34 3*   PO2 ART mm Hg 65 9*   HCO3 ART mmol/L 19 8*   BASE EXC ART mmol/L -4 7   ABG SOURCE  Line, Arterial     VBG:  Results from last 7 days   Lab Units 21  0416  21  2211   PH MINE   --   --  7 317   PCO2 MINE mm Hg  --   --  47 1   PO2 MINE mm Hg  --   --  33 9*   HCO3 MINE mmol/L  --   --  23 6*   BASE EXC MINE mmol/L  --   --  -2 6   ABG SOURCE  Line, Arterial   < >  --     < > = values in this interval not displayed  Results from last 7 days   Lab Units 21  0835 21  0430 21  0338 21  0508 21  0205   PROCALCITONIN ng/ml 22 23* 21 47* 25 71* 11 45* 6 90*       Micro  Results from last 7 days   Lab Units 21  0817 21  0508 21  0406 21  0347 20  2205 20  1816 20  1737   BLOOD CULTURE   --  No Growth at 48 hrs  No Growth at 48 hrs   --   --  No Growth at 48 hrs  No Growth at 48 hrs  GRAM STAIN RESULT  No polys seen*  Rare Gram positive cocci in clusters*  --   --   --   --   --   --    MRSA CULTURE ONLY   --   --   --   --  No Methicillin Resistant Stapylococcus aureus (MRSA) after 24 hours  --   --    C DIFF TOXIN B BY PCR   --   --   --  Negative  --   --   --        Imagin/3 US kidney/bladder: 1  No sonographically evident urolithiasis, hydronephrosis, or other acute findings      2   Bladder poorly evaluated due to Nguyen catheter      3    Unchanged benign renal cysts without lesions warranting further dedicated imaging workup      4   Mild ascites  1/3 CXR: Left jugular catheter at cavoatrial junction with no pneumothorax      Malpositioned right central catheter in the carotid artery and aorta, known to the clinical service      Left greater than right consolidation which could be due to asymmetric edema or pneumonia  I have personally reviewed pertinent reports  Intake and Output  I/O       01/02 0701 - 01/03 0700 01/03 0701 - 01/04 0700    P  O  0 0    I V  (mL/kg) 4465 1 (53 9) 2089 2 (21 9)    NG/GT 50 50    IV Piggyback 1600 790    Total Intake(mL/kg) 6115 1 (73 9) 2929 2 (30 6)    Urine (mL/kg/hr) 240 (0 1) 480 (0 2)    Emesis/NG output 0 50    Other  0    Stool 0 0    Blood 25     Total Output 265 530    Net +5850 1 +2399 2          Unmeasured Stool Occurrence 0 x 0 x        Height and Weights      IBW: -92 5 kg  Body mass index is 30 24 kg/m²  Weight (last 2 days)     Date/Time   Weight    01/03/21 1729   95 6 (210 76)    01/03/21 0600   82 8 (182 54)    01/02/21 0600   80 3 (177 03)    01/02/21 0500   80 3 (177 03)                Nutrition       Diet Orders   (From admission, onward)             Start     Ordered    01/01/21 0128  Diet NPO; Sips with meds  Diet effective now     Question Answer Comment   Diet Type NPO    NPO Except: Sips with meds    RD to adjust diet per protocol?  Yes        01/01/21 0130                Active Medications  Scheduled Meds:  Current Facility-Administered Medications   Medication Dose Route Frequency Provider Last Rate    acetaminophen  975 mg Oral Q8H PRN Anita Zhang MD      amiodarone  1 mg/min Intravenous Continuous Anita Zhang MD 1 mg/min (01/03/21 1905)    cefepime  1,000 mg Intravenous Q12H Anita Zhang MD 1,000 mg (01/04/21 0301)    chlorhexidine  15 mL Swish & Spit Q12H [facility] Anita Zhang MD      epinephrine  5 mcg/min Intravenous Continuous Lacrmorelia Murray PA-C 5 mcg/min (01/04/21 0503)    fentaNYL  75 mcg/hr Intravenous Continuous Anita Zhang MD 75 mcg/hr (01/03/21 1738)    fentanyl citrate (PF)  50 mcg Intravenous Q1H PRN Faustino Daniels MD      heparin (porcine)  3-20 Units/kg/hr (Order-Specific) Intravenous Titrated Faustino Daniels MD 22 Units/kg/hr (01/03/21 1840)    heparin (porcine)  2,000 Units Intravenous Q1H PRN Faustino Daniels MD      heparin (porcine)  4,000 Units Intravenous Q1H PRN Faustino Daniels MD      hydrocortisone sodium succinate  50 mg Intravenous Q6H [address] Faustino Daniels MD      insulin regular (HumuLIN R,NovoLIN R) infusion  0 3-21 Units/hr Intravenous Titrated Faustino Daniels MD 1 Units/hr (01/04/21 0421)    melatonin  6 mg Oral HS Faustino Daniels MD      metolazone  10 mg Oral Daily Faustino Daniels MD      metroNIDAZOLE  500 mg Intravenous Q8H Faustino Daniels  mg (01/04/21 0132)    milrinone (PRIMACOR) infusion  0 25 mcg/kg/min Intravenous Continuous Faustino Daniels MD 0 25 mcg/kg/min (01/03/21 1612)    norepinephrine  1-30 mcg/min Intravenous Titrated Faustino Daniels MD 28 mcg/min (01/04/21 0415)   Normie Glory NxStage K 4/Ca 3  20,000 mL Dialysis Continuous Claiborne List, DO      pantoprazole  40 mg Intravenous Q12H Dimitry Faust MD      polyethylene glycol  17 g Oral Daily PRN Faustino Daniels MD      senna-docusate sodium  1 tablet Oral HS Faustino Daniels MD      sodium chloride  5 mL/hr Intravenous Continuous Faustino Daniels MD 5 mL/hr (01/01/21 0320)    vancomycin  15 mg/kg Intravenous Q24H Faustino Daniels MD Stopped (01/03/21 1502)    vasopressin (PITRESSIN) in 0 9 % sodium chloride 100 mL  0 04 Units/min Intravenous Continuous Faustino Daniels MD 0 04 Units/min (01/04/21 0407)     Continuous Infusions:  amiodarone, 1 mg/min, Last Rate: 1 mg/min (01/03/21 1905)  epinephrine, 5 mcg/min, Last Rate: 5 mcg/min (01/04/21 0503)  fentaNYL, 75 mcg/hr, Last Rate: 75 mcg/hr (01/03/21 6440)  heparin (porcine), 3-20 Units/kg/hr (Order-Specific), Last Rate: 22 Units/kg/hr (01/03/21 1840)  insulin regular (HumuLIN R,NovoLIN R) infusion, 0 3-21 Units/hr, Last Rate: 1 Units/hr (01/04/21 0421)  milrinone (PRIMACOR) infusion, 0 25 mcg/kg/min, Last Rate: 0 25 mcg/kg/min (01/03/21 1612)  norepinephrine, 1-30 mcg/min, Last Rate: 28 mcg/min (01/04/21 2225)  NxStage K 4/Ca 3, 20,000 mL  sodium chloride, 5 mL/hr, Last Rate: 5 mL/hr (01/01/21 0320)  vasopressin (PITRESSIN) in 0 9 % sodium chloride 100 mL, 0 04 Units/min, Last Rate: 0 04 Units/min (01/04/21 8237)      PRN Meds:   acetaminophen, 975 mg, Q8H PRN  fentanyl citrate (PF), 50 mcg, Q1H PRN  heparin (porcine), 2,000 Units, Q1H PRN  heparin (porcine), 4,000 Units, Q1H PRN  polyethylene glycol, 17 g, Daily PRN        Allergies   Allergies   Allergen Reactions    Celecoxib Other (See Comments)    Other Other (See Comments)     Skin irritated and opened up     ---------------------------------------------------------------------------------------  Advance Directive and Living Will:      Power of :    POLST:    ---------------------------------------------------------------------------------------  Care Time Delivered:   No Critical Care time spent     Shaunna Freeman PA-C

## 2021-01-04 NOTE — PROGRESS NOTES
Vancomycin IV Pharmacy-to-Dose Consultation    Jesus Grimaldo is a 77 y o  female who is currently receiving Vancomycin IV with management by the Pharmacy Consult service for the treatment of shock, right gluteal cellulitis, possible pneumonia  Assessment/Plan:    The patient's chart was reviewed  Patient continues on CVVHD  There are no signs or symptoms of nephrotoxicity and/or infusion reactions documented  Based on todays assessment and therapeutic trough of 19 6 (extrapolated at 24 hours is 18 5), will continue current vancomycin (Day # 4) dosing of 1000mg iv q24, with a plan for trough to be drawn at 1230 on 1/8 (maintenance trough)  Pharmacy will continue to follow closely for s/sx of nephrotoxicity, infusion reactions and appropriateness of therapy  BMP and CBC will be ordered per protocol  We will continue to follow the patients culture results and clinical progress daily        Jerad Solorzano, PharmD, [address] Clinical Pharmacist  [phone]

## 2021-01-04 NOTE — DEATH NOTE
INPATIENT DEATH NOTE  Dioni Worthington 77 y o  female MRN: [id number]  Unit/Bed#: [facility] 807-69 Encounter: [id number]    Date, Time and Cause of Death    Date of Death: 21  Time of Death:  3:10 PM  Preliminary Cause of Death: Septic shock ([facility] [address] )  Entered by: Shira LAWSON[JS1 1]     Attribution     JS1 1 RENNY Pedroza 21 15:23           Patient's Information  Pronounced by: Melony Pineda  Did the patient's death occur in the ED?: No  Did the patient's death occur in the OR?: No  Did the patient's death occur less than 10 days post-op?: Yes  Did the patient's death occur within 24 hours of admission?: No  Was code status DNR at the time of death?: Yes    PHYSICAL EXAM:  Unresponsive to noxious stimuli, Spontaneous respirations absent, Breath sounds absent, Carotid pulse absent, Heart sounds absent, Pupillary light reflex absent and Corneal blink reflex absent    Medical Examiner notification criteria:  NONE APPLICABLE   Medical Examiner's office notified?:  No, does not meet ME notification criteria   Medical Examiner accepted case?:  No  Name of Medical Examiner: N/A    Family Notification  Was the family notified?: Yes  Date Notified: 21  Time Notified: 7915  Notified by: Melony Pineda  Name of Family Notified of Death: Cass Nielsen and Tom Maria at bedside   Relationship to Patient: Friend  Family Notification Route:  At bedside  Was the family told to contact a  home?: Yes    Autopsy Options:  Post-mortem examination declined by next of kin    Primary Service Attending Physician notified?:  yes - Attending:  Vinh Rsoas MD     Physician/Resident responsible for completing Discharge Summary:  RENNY Pedroza

## 2021-01-04 NOTE — RESPIRATORY THERAPY NOTE
RT Ventilator Management Note  Emily Montalvo 77 y o  female MRN: [id number]  Unit/Bed#: [facility] 799-30 Encounter: [id number]      Daily Screen       1/3/2021  0754 1/4/2021  0763          Patient safety screen outcome[de-identified]  --  Passed      Not Ready for Weaning due to[de-identified]  --  Hemodynamically unstable      Spont breathing trial outcome[de-identified]  Failed  Failed      Spont breathing trial reason failed:  RSBI > 100  RR > 35 bpm;RSBI > 100      Previous settings resumed:  Yes  Yes      Name of Medical Team Notified[de-identified]  --  --              Physical Exam:   Assessment Type: (P) Assess only  General Appearance: (P) Awake  Respiratory Pattern: (P) Assisted  Chest Assessment: (P) Chest expansion symmetrical  Bilateral Breath Sounds: Diminished  Cough: (P) Non-productive  Suction: (P) ET Tube  O2 Device: (P) vent      Resp Comments: (P) Pt cont to ahmet current VC+ settings no distress noted will attempt PS wean as ordered after nursing assessments have been done and will cont to monitor

## 2021-01-05 LAB
ATRIAL RATE: 101 BPM
ATRIAL RATE: 153 BPM
BACTERIA SPEC ANAEROBE CULT: NORMAL
BACTERIA WND AEROBE CULT: ABNORMAL
GRAM STN SPEC: ABNORMAL
GRAM STN SPEC: ABNORMAL
QRS AXIS: -39 DEGREES
QRS AXIS: 117 DEGREES
QRSD INTERVAL: 106 MS
QRSD INTERVAL: 106 MS
QT INTERVAL: 300 MS
QT INTERVAL: 336 MS
QTC INTERVAL: 435 MS
QTC INTERVAL: 450 MS
T WAVE AXIS: -41 DEGREES
T WAVE AXIS: 89 DEGREES
VENTRICULAR RATE: 101 BPM
VENTRICULAR RATE: 135 BPM

## 2021-01-05 NOTE — CLINICAL RISK MANAGEMENT
Progress Note - Death in Restraints   Laura Espitia 77 y o  female MRN: [id number]  Unit/Bed#: [facility] 839-91 Encounter: [id number]      Patient  within 24 hours of restraint  with Soft restraint right wrist and Soft restraint left wrist  Death unrelated to use of restraints  This situation was tracked internally  CMS and PA-MARIELENA  notification not required

## 2021-01-06 LAB
BACTERIA BLD CULT: NORMAL

## 2021-01-07 NOTE — UTILIZATION REVIEW
Notification of Discharge  This is a Notification of Discharge from our facility [address]  Please be advised that this patient has been discharge from our facility  Below you will find the admission and discharge date and time including the patients disposition  PRESENTATION DATE: 2020 11:02 AM  OBS ADMISSION DATE:   IP ADMISSION DATE: 20   DISCHARGE DATE: 2021 12:34 AM  DISPOSITION:     Admission Orders listed below:  Admission Orders (From admission, onward)     Ordered        20 143  Inpatient Admission  Once                   Please contact the UR Department if additional information is required to close this patient's authorization/case  [address] [facility] Utilization Review Department  Main: [phone] x carefully listen to the prompts  All voicemails are confidential   [email or web address]  Send all requests for admission clinical reviews, approved or denied determinations and any other requests to dedicated fax number below belonging to the campus where the patient is receiving treatment   List of dedicated fax numbers:  [address] DENIALS (Administrative/Medical Necessity) [phone]   [address] (Maternity/NICU/Pediatrics) [phone]   Eric Freitas [phone]   Romulo Caller [phone]   [facility] [phone]   Lesia Landinis [address] [facility] [phone]   [facility]  [phone]   [address] [facility] [address]  2401 [facility] 1000 [address] [facility] [phone]

## 2021-01-08 NOTE — UTILIZATION REVIEW
Notification of Discharge  This is a Notification of Discharge from our facility [address]  Please be advised that this patient has been discharge from our facility  Below you will find the admission and discharge date and time including the patients disposition  PRESENTATION DATE: 2021  1:05 AM  OBS ADMISSION DATE:   IP ADMISSION DATE: 21   DISCHARGE DATE: 2021  4:52 PM  DISPOSITION:     Admission Orders listed below:  Admission Orders (From admission, onward)     Ordered        21 0125  Inpatient Admission  Once                   Please contact the UR Department if additional information is required to close this patient's authorization/case  [address] Utilization Review Department  Main: [phone] x carefully listen to the prompts  All voicemails are confidential   [email or web address]  Send all requests for admission clinical reviews, approved or denied determinations and any other requests to dedicated fax number below belonging to the campus where the patient is receiving treatment   List of dedicated fax numbers:  [address] DENIALS (Administrative/Medical Necessity) [phone]   [address] (Maternity/NICU/Pediatrics) [phone]   [facility] [phone]   [facility] [phone]   [facility] [phone]   [address] [phone]   [facility]  [phone]   [address] [facility] [address]  [address] [address] [address] [facility] [phone]

## 2021-01-19 NOTE — UTILIZATION REVIEW
Notification of Discharge  This is a Notification of Discharge from our facility [address]  Please be advised that this patient has been discharge from our facility  Below you will find the admission and discharge date and time including the patients disposition  PRESENTATION DATE: 2020 11:02 AM  OBS ADMISSION DATE:   IP ADMISSION DATE: 20   DISCHARGE DATE: 2021 12:34 AM  DISPOSITION:     Admission Orders listed below:  Admission Orders (From admission, onward)     Ordered        20 1437  Inpatient Admission  Once                   Please contact the UR Department if additional information is required to close this patient's authorization/case  [address] [facility] Utilization Review Department  Main: [phone] x carefully listen to the prompts  All voicemails are confidential   [email or web address]  Send all requests for admission clinical reviews, approved or denied determinations and any other requests to dedicated fax number below belonging to the campus where the patient is receiving treatment   List of dedicated fax numbers:  [address] DENIALS (Administrative/Medical Necessity) [phone]   [address] (Maternity/NICU/Pediatrics) [phone]   [facility] [phone]     [address] [phone]   [facility] [phone]   [facility] [address] [facility] [phone]   [facility]  [phone]   [address] [facility] [address] [facility]  [address] [facility] [address] [facility] [phone]

## 2021-02-02 LAB — FUNGUS SPEC CULT: NORMAL

## 2021-02-16 LAB
MYCOBACTERIUM SPEC CULT: NORMAL
RHODAMINE-AURAMINE STN SPEC: NORMAL

## (undated) DEVICE — SPONGE STICK WITH PVP-I: Brand: KENDALL

## (undated) DEVICE — BETHLEHEM UNIVERSAL MINOR GEN: Brand: CARDINAL HEALTH

## (undated) DEVICE — PLUMEPEN PRO 10FT

## (undated) DEVICE — HEAVY DRAINAGE PACK: Brand: CURITY

## (undated) DEVICE — 3000CC GUARDIAN II: Brand: GUARDIAN

## (undated) DEVICE — GLOVE SRG BIOGEL 8

## (undated) DEVICE — GLOVE SRG BIOGEL 7.5

## (undated) DEVICE — KERLIX BANDAGE ROLL: Brand: KERLIX

## (undated) DEVICE — INTENDED FOR TISSUE SEPARATION, AND OTHER PROCEDURES THAT REQUIRE A SHARP SURGICAL BLADE TO PUNCTURE OR CUT.: Brand: BARD-PARKER SAFETY BLADES SIZE 15, STERILE

## (undated) DEVICE — GLOVE INDICATOR PI UNDERGLOVE SZ 8 BLUE

## (undated) DEVICE — ABDOMINAL PAD: Brand: DERMACEA